# Patient Record
Sex: MALE | Race: BLACK OR AFRICAN AMERICAN | NOT HISPANIC OR LATINO | Employment: FULL TIME | URBAN - METROPOLITAN AREA
[De-identification: names, ages, dates, MRNs, and addresses within clinical notes are randomized per-mention and may not be internally consistent; named-entity substitution may affect disease eponyms.]

---

## 2017-02-13 PROBLEM — E78.6 HDL LIPOPROTEIN DEFICIENCY: Status: ACTIVE | Noted: 2017-02-13

## 2017-02-13 PROBLEM — E78.5 HYPERLIPIDEMIA: Status: ACTIVE | Noted: 2017-02-13

## 2017-02-13 PROBLEM — E11.29 DIABETES MELLITUS WITH PROTEINURIA: Status: ACTIVE | Noted: 2017-02-13

## 2017-02-13 PROBLEM — R79.89 ABNORMAL LIVER FUNCTION TESTS: Status: ACTIVE | Noted: 2017-02-13

## 2017-02-13 PROBLEM — R80.9 MICROALBUMINURIA: Status: ACTIVE | Noted: 2017-02-13

## 2017-02-13 PROBLEM — R80.9 DIABETES MELLITUS WITH PROTEINURIA: Status: ACTIVE | Noted: 2017-02-13

## 2019-01-01 ENCOUNTER — LAB VISIT (OUTPATIENT)
Dept: LAB | Facility: HOSPITAL | Age: 54
End: 2019-01-01
Attending: FAMILY MEDICINE
Payer: COMMERCIAL

## 2019-01-01 ENCOUNTER — HOSPITAL ENCOUNTER (INPATIENT)
Facility: HOSPITAL | Age: 54
LOS: 8 days | DRG: 270 | End: 2019-04-16
Attending: EMERGENCY MEDICINE | Admitting: INTERNAL MEDICINE
Payer: COMMERCIAL

## 2019-01-01 ENCOUNTER — CLINICAL SUPPORT (OUTPATIENT)
Dept: OPHTHALMOLOGY | Facility: CLINIC | Age: 54
End: 2019-01-01
Attending: FAMILY MEDICINE
Payer: COMMERCIAL

## 2019-01-01 ENCOUNTER — HOSPITAL ENCOUNTER (OUTPATIENT)
Dept: RADIOLOGY | Facility: HOSPITAL | Age: 54
Discharge: HOME | DRG: 270 | End: 2019-04-05
Attending: FAMILY MEDICINE
Payer: COMMERCIAL

## 2019-01-01 ENCOUNTER — ANESTHESIA (OUTPATIENT)
Dept: INTENSIVE CARE | Facility: HOSPITAL | Age: 54
DRG: 270 | End: 2019-01-01
Payer: COMMERCIAL

## 2019-01-01 ENCOUNTER — ANESTHESIA EVENT (OUTPATIENT)
Dept: INTENSIVE CARE | Facility: HOSPITAL | Age: 54
DRG: 270 | End: 2019-01-01
Payer: COMMERCIAL

## 2019-01-01 ENCOUNTER — TELEPHONE (OUTPATIENT)
Dept: FAMILY MEDICINE | Facility: CLINIC | Age: 54
End: 2019-01-01

## 2019-01-01 ENCOUNTER — OFFICE VISIT (OUTPATIENT)
Dept: FAMILY MEDICINE | Facility: CLINIC | Age: 54
End: 2019-01-01
Payer: COMMERCIAL

## 2019-01-01 VITALS
HEIGHT: 71 IN | TEMPERATURE: 98 F | SYSTOLIC BLOOD PRESSURE: 132 MMHG | RESPIRATION RATE: 20 BRPM | RESPIRATION RATE: 22 BRPM | BODY MASS INDEX: 33.21 KG/M2 | OXYGEN SATURATION: 93 % | TEMPERATURE: 100 F | OXYGEN SATURATION: 97 % | DIASTOLIC BLOOD PRESSURE: 84 MMHG | WEIGHT: 232 LBS | WEIGHT: 244.06 LBS | HEART RATE: 108 BPM | BODY MASS INDEX: 34.17 KG/M2 | HEIGHT: 70 IN | SYSTOLIC BLOOD PRESSURE: 83 MMHG | DIASTOLIC BLOOD PRESSURE: 45 MMHG

## 2019-01-01 DIAGNOSIS — R60.0 BILATERAL LOWER EXTREMITY EDEMA: ICD-10-CM

## 2019-01-01 DIAGNOSIS — F17.200 TOBACCO USE DISORDER: ICD-10-CM

## 2019-01-01 DIAGNOSIS — E66.9 OBESITY, UNSPECIFIED CLASSIFICATION, UNSPECIFIED OBESITY TYPE, UNSPECIFIED WHETHER SERIOUS COMORBIDITY PRESENT: ICD-10-CM

## 2019-01-01 DIAGNOSIS — I21.4 NSTEMI (NON-ST ELEVATED MYOCARDIAL INFARCTION): ICD-10-CM

## 2019-01-01 DIAGNOSIS — E11.65 UNCONTROLLED TYPE 2 DIABETES MELLITUS WITH HYPERGLYCEMIA: ICD-10-CM

## 2019-01-01 DIAGNOSIS — J81.1 PULMONARY EDEMA: ICD-10-CM

## 2019-01-01 DIAGNOSIS — J96.01 ACUTE RESPIRATORY FAILURE WITH HYPOXIA: ICD-10-CM

## 2019-01-01 DIAGNOSIS — R06.02 SOB (SHORTNESS OF BREATH): ICD-10-CM

## 2019-01-01 DIAGNOSIS — Z13.220 ENCOUNTER FOR LIPID SCREENING FOR CARDIOVASCULAR DISEASE: ICD-10-CM

## 2019-01-01 DIAGNOSIS — R80.9 DIABETES MELLITUS WITH PROTEINURIA: Primary | ICD-10-CM

## 2019-01-01 DIAGNOSIS — R94.31 ABNORMAL EKG: ICD-10-CM

## 2019-01-01 DIAGNOSIS — I50.20 HFREF (HEART FAILURE WITH REDUCED EJECTION FRACTION): ICD-10-CM

## 2019-01-01 DIAGNOSIS — I16.1 HYPERTENSIVE EMERGENCY: Primary | ICD-10-CM

## 2019-01-01 DIAGNOSIS — J81.0 ACUTE PULMONARY EDEMA: ICD-10-CM

## 2019-01-01 DIAGNOSIS — Z13.6 ENCOUNTER FOR LIPID SCREENING FOR CARDIOVASCULAR DISEASE: ICD-10-CM

## 2019-01-01 DIAGNOSIS — R06.02 SOB (SHORTNESS OF BREATH): Primary | ICD-10-CM

## 2019-01-01 DIAGNOSIS — I49.01 VENTRICULAR FIBRILLATION: ICD-10-CM

## 2019-01-01 DIAGNOSIS — J96.02 ACUTE RESPIRATORY FAILURE WITH HYPOXIA AND HYPERCAPNIA: ICD-10-CM

## 2019-01-01 DIAGNOSIS — R57.0 CARDIOGENIC SHOCK: ICD-10-CM

## 2019-01-01 DIAGNOSIS — R79.89 ELEVATED TROPONIN: ICD-10-CM

## 2019-01-01 DIAGNOSIS — I47.20 VT (VENTRICULAR TACHYCARDIA): ICD-10-CM

## 2019-01-01 DIAGNOSIS — I49.9 ABNORMAL HEART RHYTHM: ICD-10-CM

## 2019-01-01 DIAGNOSIS — I50.41 ACUTE COMBINED SYSTOLIC AND DIASTOLIC CONGESTIVE HEART FAILURE: ICD-10-CM

## 2019-01-01 DIAGNOSIS — E11.29 DIABETES MELLITUS WITH PROTEINURIA: Primary | ICD-10-CM

## 2019-01-01 DIAGNOSIS — Z72.0 TOBACCO ABUSE: ICD-10-CM

## 2019-01-01 DIAGNOSIS — Z12.11 COLON CANCER SCREENING: ICD-10-CM

## 2019-01-01 DIAGNOSIS — R06.02 SOB (SHORTNESS OF BREATH) ON EXERTION: Primary | ICD-10-CM

## 2019-01-01 DIAGNOSIS — I50.9 CHF (CONGESTIVE HEART FAILURE): ICD-10-CM

## 2019-01-01 DIAGNOSIS — J96.01 ACUTE RESPIRATORY FAILURE WITH HYPOXIA AND HYPERCAPNIA: ICD-10-CM

## 2019-01-01 DIAGNOSIS — R06.02 SHORTNESS OF BREATH: ICD-10-CM

## 2019-01-01 LAB
ABO + RH BLD: NORMAL
ABO + RH BLD: NORMAL
ALBUMIN SERPL BCP-MCNC: 2 G/DL (ref 3.5–5.2)
ALBUMIN SERPL BCP-MCNC: 2 G/DL (ref 3.5–5.2)
ALBUMIN SERPL BCP-MCNC: 2.1 G/DL (ref 3.5–5.2)
ALBUMIN SERPL BCP-MCNC: 2.2 G/DL (ref 3.5–5.2)
ALBUMIN SERPL BCP-MCNC: 2.3 G/DL (ref 3.5–5.2)
ALBUMIN SERPL BCP-MCNC: 2.3 G/DL (ref 3.5–5.2)
ALBUMIN SERPL BCP-MCNC: 2.4 G/DL (ref 3.5–5.2)
ALBUMIN SERPL BCP-MCNC: 2.9 G/DL (ref 3.5–5.2)
ALBUMIN SERPL BCP-MCNC: 3.3 G/DL (ref 3.5–5.2)
ALLENS TEST: ABNORMAL
ALLENS TEST: NORMAL
ALP SERPL-CCNC: 101 U/L (ref 55–135)
ALP SERPL-CCNC: 109 U/L (ref 55–135)
ALP SERPL-CCNC: 110 U/L (ref 55–135)
ALP SERPL-CCNC: 111 U/L (ref 55–135)
ALP SERPL-CCNC: 64 U/L (ref 55–135)
ALP SERPL-CCNC: 70 U/L (ref 55–135)
ALP SERPL-CCNC: 74 U/L (ref 55–135)
ALP SERPL-CCNC: 87 U/L (ref 55–135)
ALP SERPL-CCNC: 89 U/L (ref 55–135)
ALP SERPL-CCNC: 90 U/L (ref 55–135)
ALP SERPL-CCNC: 90 U/L (ref 55–135)
ALP SERPL-CCNC: 94 U/L (ref 55–135)
ALP SERPL-CCNC: 95 U/L (ref 55–135)
ALP SERPL-CCNC: 99 U/L (ref 55–135)
ALT SERPL W/O P-5'-P-CCNC: 36 U/L (ref 10–44)
ALT SERPL W/O P-5'-P-CCNC: 39 U/L (ref 10–44)
ALT SERPL W/O P-5'-P-CCNC: 40 U/L (ref 10–44)
ALT SERPL W/O P-5'-P-CCNC: 40 U/L (ref 10–44)
ALT SERPL W/O P-5'-P-CCNC: 45 U/L (ref 10–44)
ALT SERPL W/O P-5'-P-CCNC: 45 U/L (ref 10–44)
ALT SERPL W/O P-5'-P-CCNC: 46 U/L (ref 10–44)
ALT SERPL W/O P-5'-P-CCNC: 46 U/L (ref 10–44)
ALT SERPL W/O P-5'-P-CCNC: 47 U/L (ref 10–44)
ALT SERPL W/O P-5'-P-CCNC: 47 U/L (ref 10–44)
ALT SERPL W/O P-5'-P-CCNC: 49 U/L (ref 10–44)
ALT SERPL W/O P-5'-P-CCNC: 49 U/L (ref 10–44)
ALT SERPL W/O P-5'-P-CCNC: 55 U/L (ref 10–44)
ALT SERPL W/O P-5'-P-CCNC: 59 U/L (ref 10–44)
ALT SERPL W/O P-5'-P-CCNC: 59 U/L (ref 10–44)
ALT SERPL W/O P-5'-P-CCNC: 62 U/L (ref 10–44)
ANION GAP SERPL CALC-SCNC: 10 MMOL/L (ref 8–16)
ANION GAP SERPL CALC-SCNC: 11 MMOL/L (ref 8–16)
ANION GAP SERPL CALC-SCNC: 12 MMOL/L (ref 8–16)
ANION GAP SERPL CALC-SCNC: 13 MMOL/L (ref 8–16)
ANION GAP SERPL CALC-SCNC: 14 MMOL/L (ref 8–16)
ANION GAP SERPL CALC-SCNC: 14 MMOL/L (ref 8–16)
ANION GAP SERPL CALC-SCNC: 8 MMOL/L (ref 8–16)
ANION GAP SERPL CALC-SCNC: 9 MMOL/L (ref 8–16)
ANION GAP SERPL CALC-SCNC: 9 MMOL/L (ref 8–16)
AORTIC ROOT ANNULUS: 3.58 CM
AORTIC VALVE CUSP SEPERATION: 2.58 CM
APTT BLDCRRT: 25.2 SEC (ref 21–32)
APTT BLDCRRT: 25.2 SEC (ref 21–32)
APTT BLDCRRT: 25.5 SEC (ref 21–32)
APTT BLDCRRT: 26.2 SEC (ref 21–32)
APTT BLDCRRT: 26.4 SEC (ref 21–32)
APTT BLDCRRT: 26.4 SEC (ref 21–32)
APTT BLDCRRT: 27.8 SEC (ref 21–32)
APTT BLDCRRT: 29.7 SEC (ref 21–32)
APTT BLDCRRT: 30.6 SEC (ref 21–32)
APTT BLDCRRT: 30.6 SEC (ref 21–32)
APTT BLDCRRT: 32.8 SEC (ref 21–32)
APTT BLDCRRT: 38.5 SEC (ref 21–32)
APTT BLDCRRT: 40.7 SEC (ref 21–32)
APTT BLDCRRT: 44.2 SEC (ref 21–32)
ASCENDING AORTA: 3.04 CM
ASCENDING AORTA: 3.61 CM
AST SERPL-CCNC: 19 U/L (ref 10–40)
AST SERPL-CCNC: 28 U/L (ref 10–40)
AST SERPL-CCNC: 30 U/L (ref 10–40)
AST SERPL-CCNC: 30 U/L (ref 10–40)
AST SERPL-CCNC: 31 U/L (ref 10–40)
AST SERPL-CCNC: 34 U/L (ref 10–40)
AST SERPL-CCNC: 39 U/L (ref 10–40)
AST SERPL-CCNC: 39 U/L (ref 10–40)
AST SERPL-CCNC: 41 U/L (ref 10–40)
AST SERPL-CCNC: 42 U/L (ref 10–40)
AST SERPL-CCNC: 43 U/L (ref 10–40)
AST SERPL-CCNC: 43 U/L (ref 10–40)
AST SERPL-CCNC: 47 U/L (ref 10–40)
AST SERPL-CCNC: 48 U/L (ref 10–40)
AST SERPL-CCNC: 48 U/L (ref 10–40)
AST SERPL-CCNC: 57 U/L (ref 10–40)
AV INDEX (PROSTH): 0.67
AV INDEX (PROSTH): 0.92
AV MEAN GRADIENT: 4.42 MMHG
AV MEAN GRADIENT: 6.13 MMHG
AV PEAK GRADIENT: 6.05 MMHG
AV PEAK GRADIENT: 9.99 MMHG
AV VALVE AREA: 2.09 CM2
AV VALVE AREA: 2.99 CM2
AV VELOCITY RATIO: 0.73
AV VELOCITY RATIO: 0.98
B-OH-BUTYR BLD STRIP-SCNC: 0.4 MMOL/L (ref 0–0.5)
BACTERIA #/AREA URNS AUTO: ABNORMAL /HPF
BACTERIA #/AREA URNS AUTO: ABNORMAL /HPF
BACTERIA BLD CULT: NORMAL
BACTERIA BLD CULT: NORMAL
BACTERIA SPEC AEROBE CULT: NORMAL
BACTERIA SPEC AEROBE CULT: NORMAL
BASOPHILS # BLD AUTO: 0 K/UL (ref 0–0.2)
BASOPHILS # BLD AUTO: 0.01 K/UL (ref 0–0.2)
BASOPHILS # BLD AUTO: 0.02 K/UL (ref 0–0.2)
BASOPHILS # BLD AUTO: 0.03 K/UL (ref 0–0.2)
BASOPHILS # BLD AUTO: 0.04 K/UL (ref 0–0.2)
BASOPHILS # BLD AUTO: 0.04 K/UL (ref 0–0.2)
BASOPHILS # BLD AUTO: 0.05 K/UL (ref 0–0.2)
BASOPHILS NFR BLD: 0 % (ref 0–1.9)
BASOPHILS NFR BLD: 0.1 % (ref 0–1.9)
BASOPHILS NFR BLD: 0.2 % (ref 0–1.9)
BASOPHILS NFR BLD: 0.3 % (ref 0–1.9)
BASOPHILS NFR BLD: 0.4 % (ref 0–1.9)
BILIRUB SERPL-MCNC: 0.5 MG/DL (ref 0.1–1)
BILIRUB SERPL-MCNC: 0.6 MG/DL (ref 0.1–1)
BILIRUB SERPL-MCNC: 0.7 MG/DL (ref 0.1–1)
BILIRUB SERPL-MCNC: 0.8 MG/DL (ref 0.1–1)
BILIRUB SERPL-MCNC: 0.9 MG/DL (ref 0.1–1)
BILIRUB SERPL-MCNC: 0.9 MG/DL (ref 0.1–1)
BILIRUB SERPL-MCNC: 1 MG/DL (ref 0.1–1)
BILIRUB SERPL-MCNC: 1.1 MG/DL (ref 0.1–1)
BILIRUB SERPL-MCNC: 1.2 MG/DL (ref 0.1–1)
BILIRUB SERPL-MCNC: 1.3 MG/DL (ref 0.1–1)
BILIRUB SERPL-MCNC: 1.4 MG/DL (ref 0.1–1)
BILIRUB UR QL STRIP: NEGATIVE
BLD GP AB SCN CELLS X3 SERPL QL: NORMAL
BLD GP AB SCN CELLS X3 SERPL QL: NORMAL
BNP SERPL-MCNC: 576 PG/ML (ref 0–99)
BNP SERPL-MCNC: 840 PG/ML (ref 0–99)
BSA FOR ECHO PROCEDURE: 2.28 M2
BSA FOR ECHO PROCEDURE: 2.34 M2
BUN SERPL-MCNC: 19 MG/DL (ref 6–20)
BUN SERPL-MCNC: 22 MG/DL (ref 6–20)
BUN SERPL-MCNC: 23 MG/DL (ref 6–20)
BUN SERPL-MCNC: 24 MG/DL (ref 6–20)
BUN SERPL-MCNC: 24 MG/DL (ref 6–20)
BUN SERPL-MCNC: 26 MG/DL (ref 6–20)
BUN SERPL-MCNC: 27 MG/DL (ref 6–20)
BUN SERPL-MCNC: 27 MG/DL (ref 6–20)
BUN SERPL-MCNC: 28 MG/DL (ref 6–20)
BUN SERPL-MCNC: 29 MG/DL (ref 6–20)
BUN SERPL-MCNC: 30 MG/DL (ref 6–20)
BUN SERPL-MCNC: 31 MG/DL (ref 6–20)
BUN SERPL-MCNC: 31 MG/DL (ref 6–20)
BUN SERPL-MCNC: 32 MG/DL (ref 6–20)
BUN SERPL-MCNC: 32 MG/DL (ref 6–20)
BUN SERPL-MCNC: 33 MG/DL (ref 6–20)
BUN SERPL-MCNC: 33 MG/DL (ref 6–20)
BUN SERPL-MCNC: 34 MG/DL (ref 6–20)
CALCIUM SERPL-MCNC: 10 MG/DL (ref 8.7–10.5)
CALCIUM SERPL-MCNC: 8.4 MG/DL (ref 8.7–10.5)
CALCIUM SERPL-MCNC: 8.4 MG/DL (ref 8.7–10.5)
CALCIUM SERPL-MCNC: 8.6 MG/DL (ref 8.7–10.5)
CALCIUM SERPL-MCNC: 8.8 MG/DL (ref 8.7–10.5)
CALCIUM SERPL-MCNC: 8.9 MG/DL (ref 8.7–10.5)
CALCIUM SERPL-MCNC: 9 MG/DL (ref 8.7–10.5)
CALCIUM SERPL-MCNC: 9.1 MG/DL (ref 8.7–10.5)
CALCIUM SERPL-MCNC: 9.5 MG/DL (ref 8.7–10.5)
CALCIUM SERPL-MCNC: 9.5 MG/DL (ref 8.7–10.5)
CALCIUM SERPL-MCNC: 9.6 MG/DL (ref 8.7–10.5)
CHLORIDE SERPL-SCNC: 100 MMOL/L (ref 95–110)
CHLORIDE SERPL-SCNC: 100 MMOL/L (ref 95–110)
CHLORIDE SERPL-SCNC: 102 MMOL/L (ref 95–110)
CHLORIDE SERPL-SCNC: 94 MMOL/L (ref 95–110)
CHLORIDE SERPL-SCNC: 94 MMOL/L (ref 95–110)
CHLORIDE SERPL-SCNC: 95 MMOL/L (ref 95–110)
CHLORIDE SERPL-SCNC: 96 MMOL/L (ref 95–110)
CHLORIDE SERPL-SCNC: 97 MMOL/L (ref 95–110)
CHLORIDE SERPL-SCNC: 98 MMOL/L (ref 95–110)
CHLORIDE SERPL-SCNC: 98 MMOL/L (ref 95–110)
CHLORIDE SERPL-SCNC: 99 MMOL/L (ref 95–110)
CHLORIDE SERPL-SCNC: 99 MMOL/L (ref 95–110)
CHOLEST SERPL-MCNC: 186 MG/DL (ref 120–199)
CHOLEST SERPL-MCNC: 214 MG/DL (ref 120–199)
CHOLEST/HDLC SERPL: 10.7 {RATIO} (ref 2–5)
CHOLEST/HDLC SERPL: 8.5 {RATIO} (ref 2–5)
CLARITY UR REFRACT.AUTO: ABNORMAL
CLARITY UR REFRACT.AUTO: ABNORMAL
CLARITY UR REFRACT.AUTO: CLEAR
CLARITY UR: ABNORMAL
CO2 SERPL-SCNC: 21 MMOL/L (ref 23–29)
CO2 SERPL-SCNC: 23 MMOL/L (ref 23–29)
CO2 SERPL-SCNC: 23 MMOL/L (ref 23–29)
CO2 SERPL-SCNC: 24 MMOL/L (ref 23–29)
CO2 SERPL-SCNC: 25 MMOL/L (ref 23–29)
CO2 SERPL-SCNC: 25 MMOL/L (ref 23–29)
CO2 SERPL-SCNC: 26 MMOL/L (ref 23–29)
CO2 SERPL-SCNC: 26 MMOL/L (ref 23–29)
CO2 SERPL-SCNC: 27 MMOL/L (ref 23–29)
CO2 SERPL-SCNC: 28 MMOL/L (ref 23–29)
COLOR UR AUTO: ABNORMAL
COLOR UR: YELLOW
CREAT SERPL-MCNC: 0.9 MG/DL (ref 0.5–1.4)
CREAT SERPL-MCNC: 0.9 MG/DL (ref 0.5–1.4)
CREAT SERPL-MCNC: 1 MG/DL (ref 0.5–1.4)
CREAT SERPL-MCNC: 1.1 MG/DL (ref 0.5–1.4)
CREAT SERPL-MCNC: 1.2 MG/DL (ref 0.5–1.4)
CREAT SERPL-MCNC: 1.3 MG/DL (ref 0.5–1.4)
CREAT SERPL-MCNC: 1.3 MG/DL (ref 0.5–1.4)
CV ECHO LV RWT: 0.26 CM
CV ECHO LV RWT: 0.62 CM
DELSYS: ABNORMAL
DELSYS: NORMAL
DIFFERENTIAL METHOD: ABNORMAL
DIFFERENTIAL METHOD: NORMAL
DOP CALC AO PEAK VEL: 1.23 M/S
DOP CALC AO PEAK VEL: 1.58 M/S
DOP CALC AO VTI: 22.13 CM
DOP CALC AO VTI: 23.93 CM
DOP CALC LVOT AREA: 3.14 CM2
DOP CALC LVOT AREA: 3.23 CM2
DOP CALC LVOT DIAMETER: 2 CM
DOP CALC LVOT DIAMETER: 2.03 CM
DOP CALC LVOT PEAK VEL: 1.15 M/S
DOP CALC LVOT PEAK VEL: 1.2 M/S
DOP CALC LVOT STROKE VOLUME: 46.32 CM3
DOP CALC LVOT STROKE VOLUME: 71.56 CM3
DOP CALCLVOT PEAK VEL VTI: 14.75 CM
DOP CALCLVOT PEAK VEL VTI: 22.12 CM
E WAVE DECELERATION TIME: 151.03 MSEC
E/A RATIO: 2.02
E/E' RATIO: 17.83
ECHO LV POSTERIOR WALL: 0.8 CM (ref 0.6–1.1)
ECHO LV POSTERIOR WALL: 1.45 CM (ref 0.6–1.1)
EOSINOPHIL # BLD AUTO: 0.1 K/UL (ref 0–0.5)
EOSINOPHIL # BLD AUTO: 0.2 K/UL (ref 0–0.5)
EOSINOPHIL # BLD AUTO: 0.3 K/UL (ref 0–0.5)
EOSINOPHIL # BLD AUTO: 0.4 K/UL (ref 0–0.5)
EOSINOPHIL NFR BLD: 0.4 % (ref 0–8)
EOSINOPHIL NFR BLD: 0.5 % (ref 0–8)
EOSINOPHIL NFR BLD: 0.5 % (ref 0–8)
EOSINOPHIL NFR BLD: 0.7 % (ref 0–8)
EOSINOPHIL NFR BLD: 1.2 % (ref 0–8)
EOSINOPHIL NFR BLD: 1.3 % (ref 0–8)
EOSINOPHIL NFR BLD: 1.3 % (ref 0–8)
EOSINOPHIL NFR BLD: 1.4 % (ref 0–8)
EOSINOPHIL NFR BLD: 1.4 % (ref 0–8)
EOSINOPHIL NFR BLD: 1.6 % (ref 0–8)
EOSINOPHIL NFR BLD: 1.7 % (ref 0–8)
EOSINOPHIL NFR BLD: 1.9 % (ref 0–8)
EOSINOPHIL NFR BLD: 2 % (ref 0–8)
EOSINOPHIL NFR BLD: 2.4 % (ref 0–8)
EOSINOPHIL NFR BLD: 2.6 % (ref 0–8)
EOSINOPHIL NFR BLD: 3.2 % (ref 0–8)
EP: 5
EP: 8
ERYTHROCYTE [DISTWIDTH] IN BLOOD BY AUTOMATED COUNT: 11.8 % (ref 11.5–14.5)
ERYTHROCYTE [DISTWIDTH] IN BLOOD BY AUTOMATED COUNT: 12.6 % (ref 11.5–14.5)
ERYTHROCYTE [DISTWIDTH] IN BLOOD BY AUTOMATED COUNT: 12.7 % (ref 11.5–14.5)
ERYTHROCYTE [DISTWIDTH] IN BLOOD BY AUTOMATED COUNT: 12.8 % (ref 11.5–14.5)
ERYTHROCYTE [DISTWIDTH] IN BLOOD BY AUTOMATED COUNT: 12.9 % (ref 11.5–14.5)
ERYTHROCYTE [DISTWIDTH] IN BLOOD BY AUTOMATED COUNT: 13.1 % (ref 11.5–14.5)
ERYTHROCYTE [DISTWIDTH] IN BLOOD BY AUTOMATED COUNT: 13.2 % (ref 11.5–14.5)
ERYTHROCYTE [DISTWIDTH] IN BLOOD BY AUTOMATED COUNT: 13.2 % (ref 11.5–14.5)
ERYTHROCYTE [SEDIMENTATION RATE] IN BLOOD BY WESTERGREN METHOD: 12 MM/H
ERYTHROCYTE [SEDIMENTATION RATE] IN BLOOD BY WESTERGREN METHOD: 12 MM/H
ERYTHROCYTE [SEDIMENTATION RATE] IN BLOOD BY WESTERGREN METHOD: 16 MM/H
ERYTHROCYTE [SEDIMENTATION RATE] IN BLOOD BY WESTERGREN METHOD: 16 MM/H
ERYTHROCYTE [SEDIMENTATION RATE] IN BLOOD BY WESTERGREN METHOD: 17 MM/H
ERYTHROCYTE [SEDIMENTATION RATE] IN BLOOD BY WESTERGREN METHOD: 19 MM/H
ERYTHROCYTE [SEDIMENTATION RATE] IN BLOOD BY WESTERGREN METHOD: 37 MM/H
EST. GFR  (AFRICAN AMERICAN): >60 ML/MIN/1.73 M^2
EST. GFR  (NON AFRICAN AMERICAN): >60 ML/MIN/1.73 M^2
ESTIMATED AVG GLUCOSE: 214 MG/DL (ref 68–131)
ESTIMATED AVG GLUCOSE: 226 MG/DL (ref 68–131)
FIO2: 21
FIO2: 40
FIO2: 50
FIO2: 70
FIO2: 80
FLOW: 15
FLOW: 15
FLOW: 4
FLOW: 5
FLOW: 5
FLOW: 7
FRACTIONAL SHORTENING: 15 % (ref 28–44)
FRACTIONAL SHORTENING: 16 % (ref 28–44)
GLUCOSE SERPL-MCNC: 114 MG/DL (ref 70–110)
GLUCOSE SERPL-MCNC: 139 MG/DL (ref 70–110)
GLUCOSE SERPL-MCNC: 140 MG/DL (ref 70–110)
GLUCOSE SERPL-MCNC: 140 MG/DL (ref 70–110)
GLUCOSE SERPL-MCNC: 151 MG/DL (ref 70–110)
GLUCOSE SERPL-MCNC: 154 MG/DL (ref 70–110)
GLUCOSE SERPL-MCNC: 155 MG/DL (ref 70–110)
GLUCOSE SERPL-MCNC: 158 MG/DL (ref 70–110)
GLUCOSE SERPL-MCNC: 163 MG/DL (ref 70–110)
GLUCOSE SERPL-MCNC: 164 MG/DL (ref 70–110)
GLUCOSE SERPL-MCNC: 166 MG/DL (ref 70–110)
GLUCOSE SERPL-MCNC: 169 MG/DL (ref 70–110)
GLUCOSE SERPL-MCNC: 169 MG/DL (ref 70–110)
GLUCOSE SERPL-MCNC: 173 MG/DL (ref 70–110)
GLUCOSE SERPL-MCNC: 183 MG/DL (ref 70–110)
GLUCOSE SERPL-MCNC: 193 MG/DL (ref 70–110)
GLUCOSE SERPL-MCNC: 198 MG/DL (ref 70–110)
GLUCOSE SERPL-MCNC: 206 MG/DL (ref 70–110)
GLUCOSE SERPL-MCNC: 278 MG/DL (ref 70–110)
GLUCOSE SERPL-MCNC: 281 MG/DL (ref 70–110)
GLUCOSE SERPL-MCNC: 292 MG/DL (ref 70–110)
GLUCOSE UR QL STRIP: NEGATIVE
GRAM STN SPEC: NORMAL
GRAN CASTS #/AREA URNS LPF: 0.2 /LPF
HAV IGM SERPL QL IA: NEGATIVE
HBA1C MFR BLD HPLC: 9.1 % (ref 4–5.6)
HBA1C MFR BLD HPLC: 9.5 % (ref 4–5.6)
HBV CORE IGM SERPL QL IA: NEGATIVE
HBV SURFACE AG SERPL QL IA: NEGATIVE
HCO3 UR-SCNC: 23.2 MMOL/L (ref 24–28)
HCO3 UR-SCNC: 23.6 MMOL/L (ref 24–28)
HCO3 UR-SCNC: 23.8 MMOL/L (ref 24–28)
HCO3 UR-SCNC: 23.9 MMOL/L (ref 24–28)
HCO3 UR-SCNC: 24.4 MMOL/L (ref 24–28)
HCO3 UR-SCNC: 24.9 MMOL/L (ref 24–28)
HCO3 UR-SCNC: 25.2 MMOL/L (ref 24–28)
HCO3 UR-SCNC: 25.2 MMOL/L (ref 24–28)
HCO3 UR-SCNC: 26.2 MMOL/L (ref 24–28)
HCO3 UR-SCNC: 26.5 MMOL/L (ref 24–28)
HCO3 UR-SCNC: 26.9 MMOL/L (ref 24–28)
HCO3 UR-SCNC: 27.2 MMOL/L (ref 24–28)
HCO3 UR-SCNC: 27.8 MMOL/L (ref 24–28)
HCO3 UR-SCNC: 28.1 MMOL/L (ref 24–28)
HCO3 UR-SCNC: 29 MMOL/L (ref 24–28)
HCT VFR BLD AUTO: 32.8 % (ref 40–54)
HCT VFR BLD AUTO: 33.7 % (ref 40–54)
HCT VFR BLD AUTO: 33.8 % (ref 40–54)
HCT VFR BLD AUTO: 34.2 % (ref 40–54)
HCT VFR BLD AUTO: 34.4 % (ref 40–54)
HCT VFR BLD AUTO: 34.7 % (ref 40–54)
HCT VFR BLD AUTO: 34.7 % (ref 40–54)
HCT VFR BLD AUTO: 35.5 % (ref 40–54)
HCT VFR BLD AUTO: 35.6 % (ref 40–54)
HCT VFR BLD AUTO: 35.6 % (ref 40–54)
HCT VFR BLD AUTO: 35.9 % (ref 40–54)
HCT VFR BLD AUTO: 37.3 % (ref 40–54)
HCT VFR BLD AUTO: 38.7 % (ref 40–54)
HCT VFR BLD AUTO: 38.8 % (ref 40–54)
HCT VFR BLD AUTO: 42.8 % (ref 40–54)
HCT VFR BLD AUTO: 43 % (ref 40–54)
HCV AB SERPL QL IA: NEGATIVE
HDLC SERPL-MCNC: 20 MG/DL (ref 40–75)
HDLC SERPL-MCNC: 22 MG/DL (ref 40–75)
HDLC SERPL: 11.8 % (ref 20–50)
HDLC SERPL: 9.3 % (ref 20–50)
HGB BLD-MCNC: 10.5 G/DL (ref 14–18)
HGB BLD-MCNC: 10.6 G/DL (ref 14–18)
HGB BLD-MCNC: 10.8 G/DL (ref 14–18)
HGB BLD-MCNC: 11 G/DL (ref 14–18)
HGB BLD-MCNC: 11 G/DL (ref 14–18)
HGB BLD-MCNC: 11.3 G/DL (ref 14–18)
HGB BLD-MCNC: 11.5 G/DL (ref 14–18)
HGB BLD-MCNC: 11.8 G/DL (ref 14–18)
HGB BLD-MCNC: 11.8 G/DL (ref 14–18)
HGB BLD-MCNC: 12 G/DL (ref 14–18)
HGB BLD-MCNC: 12.1 G/DL (ref 14–18)
HGB BLD-MCNC: 12.7 G/DL (ref 14–18)
HGB BLD-MCNC: 14.1 G/DL (ref 14–18)
HGB BLD-MCNC: 14.3 G/DL (ref 14–18)
HGB UR QL STRIP: ABNORMAL
HIV1+2 IGG SERPL QL IA.RAPID: NEGATIVE
HYALINE CASTS UR QL AUTO: 1 /LPF
HYALINE CASTS UR QL AUTO: 1 /LPF
HYALINE CASTS UR QL AUTO: 4 /LPF
IMM GRANULOCYTES # BLD AUTO: 0.03 K/UL (ref 0–0.04)
IMM GRANULOCYTES # BLD AUTO: 0.05 K/UL (ref 0–0.04)
IMM GRANULOCYTES # BLD AUTO: 0.05 K/UL (ref 0–0.04)
IMM GRANULOCYTES # BLD AUTO: 0.06 K/UL (ref 0–0.04)
IMM GRANULOCYTES # BLD AUTO: 0.07 K/UL (ref 0–0.04)
IMM GRANULOCYTES # BLD AUTO: 0.08 K/UL (ref 0–0.04)
IMM GRANULOCYTES # BLD AUTO: 0.08 K/UL (ref 0–0.04)
IMM GRANULOCYTES # BLD AUTO: 0.1 K/UL (ref 0–0.04)
IMM GRANULOCYTES # BLD AUTO: 0.11 K/UL (ref 0–0.04)
IMM GRANULOCYTES NFR BLD AUTO: 0.3 % (ref 0–0.5)
IMM GRANULOCYTES NFR BLD AUTO: 0.4 % (ref 0–0.5)
IMM GRANULOCYTES NFR BLD AUTO: 0.5 % (ref 0–0.5)
IMM GRANULOCYTES NFR BLD AUTO: 0.6 % (ref 0–0.5)
IMM GRANULOCYTES NFR BLD AUTO: 0.7 % (ref 0–0.5)
IMM GRANULOCYTES NFR BLD AUTO: 0.7 % (ref 0–0.5)
INR PPP: 1 (ref 0.8–1.2)
INR PPP: 1.1 (ref 0.8–1.2)
INTERVENTRICULAR SEPTUM: 0.9 CM (ref 0.6–1.1)
INTERVENTRICULAR SEPTUM: 1.51 CM (ref 0.6–1.1)
IP: 10
IP: 15
IVRT: 0.1 MSEC
KETONES UR QL STRIP: ABNORMAL
KETONES UR QL STRIP: NEGATIVE
L PNEUMO AG UR QL IA: NOT DETECTED
LA MAJOR: 5.62 CM
LA MAJOR: 6.73 CM
LA MINOR: 5.39 CM
LA MINOR: 5.56 CM
LA WIDTH: 4.34 CM
LA WIDTH: 4.46 CM
LACTATE SERPL-SCNC: 0.9 MMOL/L (ref 0.5–2.2)
LACTATE SERPL-SCNC: 0.9 MMOL/L (ref 0.5–2.2)
LACTATE SERPL-SCNC: 1 MMOL/L (ref 0.5–2.2)
LACTATE SERPL-SCNC: 1.6 MMOL/L (ref 0.5–2.2)
LACTATE SERPL-SCNC: 5 MMOL/L (ref 0.5–2.2)
LDH SERPL L TO P-CCNC: 1.04 MMOL/L (ref 0.5–2.2)
LDLC SERPL CALC-MCNC: 140.8 MG/DL (ref 63–159)
LDLC SERPL CALC-MCNC: 155.6 MG/DL (ref 63–159)
LEFT ATRIUM SIZE: 2.94 CM
LEFT ATRIUM SIZE: 4.43 CM
LEFT ATRIUM VOLUME INDEX: 26.1 ML/M2
LEFT ATRIUM VOLUME INDEX: 46 ML/M2
LEFT ATRIUM VOLUME: 102.26 CM3
LEFT ATRIUM VOLUME: 59.68 CM3
LEFT INTERNAL DIMENSION IN SYSTOLE: 3.98 CM (ref 2.1–4)
LEFT INTERNAL DIMENSION IN SYSTOLE: 5.2 CM (ref 2.1–4)
LEFT VENTRICLE DIASTOLIC VOLUME INDEX: 44.86 ML/M2
LEFT VENTRICLE DIASTOLIC VOLUME INDEX: 65.49 ML/M2
LEFT VENTRICLE DIASTOLIC VOLUME: 102.41 ML
LEFT VENTRICLE DIASTOLIC VOLUME: 145.6 ML
LEFT VENTRICLE MASS INDEX: 126.2 G/M2
LEFT VENTRICLE MASS INDEX: 95.6 G/M2
LEFT VENTRICLE SYSTOLIC VOLUME INDEX: 30.4 ML/M2
LEFT VENTRICLE SYSTOLIC VOLUME INDEX: 39.3 ML/M2
LEFT VENTRICLE SYSTOLIC VOLUME: 69.32 ML
LEFT VENTRICLE SYSTOLIC VOLUME: 87.38 ML
LEFT VENTRICULAR INTERNAL DIMENSION IN DIASTOLE: 4.7 CM (ref 3.5–6)
LEFT VENTRICULAR INTERNAL DIMENSION IN DIASTOLE: 6.2 CM (ref 3.5–6)
LEFT VENTRICULAR MASS: 212.52 G
LEFT VENTRICULAR MASS: 288.17 G
LEUKOCYTE ESTERASE UR QL STRIP: NEGATIVE
LV LATERAL E/E' RATIO: 13.38
LV SEPTAL E/E' RATIO: 26.75
LYMPHOCYTES # BLD AUTO: 1.2 K/UL (ref 1–4.8)
LYMPHOCYTES # BLD AUTO: 1.3 K/UL (ref 1–4.8)
LYMPHOCYTES # BLD AUTO: 1.3 K/UL (ref 1–4.8)
LYMPHOCYTES # BLD AUTO: 1.4 K/UL (ref 1–4.8)
LYMPHOCYTES # BLD AUTO: 1.5 K/UL (ref 1–4.8)
LYMPHOCYTES # BLD AUTO: 1.6 K/UL (ref 1–4.8)
LYMPHOCYTES # BLD AUTO: 1.7 K/UL (ref 1–4.8)
LYMPHOCYTES # BLD AUTO: 1.7 K/UL (ref 1–4.8)
LYMPHOCYTES # BLD AUTO: 1.9 K/UL (ref 1–4.8)
LYMPHOCYTES # BLD AUTO: 1.9 K/UL (ref 1–4.8)
LYMPHOCYTES # BLD AUTO: 2.8 K/UL (ref 1–4.8)
LYMPHOCYTES # BLD AUTO: 3 K/UL (ref 1–4.8)
LYMPHOCYTES NFR BLD: 10.8 % (ref 18–48)
LYMPHOCYTES NFR BLD: 11.5 % (ref 18–48)
LYMPHOCYTES NFR BLD: 11.5 % (ref 18–48)
LYMPHOCYTES NFR BLD: 12.7 % (ref 18–48)
LYMPHOCYTES NFR BLD: 13.5 % (ref 18–48)
LYMPHOCYTES NFR BLD: 14.4 % (ref 18–48)
LYMPHOCYTES NFR BLD: 15.9 % (ref 18–48)
LYMPHOCYTES NFR BLD: 16 % (ref 18–48)
LYMPHOCYTES NFR BLD: 16.1 % (ref 18–48)
LYMPHOCYTES NFR BLD: 16.8 % (ref 18–48)
LYMPHOCYTES NFR BLD: 18.6 % (ref 18–48)
LYMPHOCYTES NFR BLD: 19.9 % (ref 18–48)
LYMPHOCYTES NFR BLD: 20.6 % (ref 18–48)
LYMPHOCYTES NFR BLD: 8.6 % (ref 18–48)
MAGNESIUM SERPL-MCNC: 1.6 MG/DL (ref 1.6–2.6)
MAGNESIUM SERPL-MCNC: 1.8 MG/DL (ref 1.6–2.6)
MAGNESIUM SERPL-MCNC: 1.8 MG/DL (ref 1.6–2.6)
MAGNESIUM SERPL-MCNC: 1.9 MG/DL (ref 1.6–2.6)
MAGNESIUM SERPL-MCNC: 1.9 MG/DL (ref 1.6–2.6)
MAGNESIUM SERPL-MCNC: 2 MG/DL (ref 1.6–2.6)
MAGNESIUM SERPL-MCNC: 2 MG/DL (ref 1.6–2.6)
MAGNESIUM SERPL-MCNC: 2.1 MG/DL (ref 1.6–2.6)
MAGNESIUM SERPL-MCNC: 2.2 MG/DL (ref 1.6–2.6)
MAGNESIUM SERPL-MCNC: 2.3 MG/DL (ref 1.6–2.6)
MAGNESIUM SERPL-MCNC: 2.3 MG/DL (ref 1.6–2.6)
MAGNESIUM SERPL-MCNC: 2.4 MG/DL (ref 1.6–2.6)
MAGNESIUM SERPL-MCNC: 2.9 MG/DL (ref 1.6–2.6)
MCH RBC QN AUTO: 27.1 PG (ref 27–31)
MCH RBC QN AUTO: 27.3 PG (ref 27–31)
MCH RBC QN AUTO: 27.5 PG (ref 27–31)
MCH RBC QN AUTO: 27.6 PG (ref 27–31)
MCH RBC QN AUTO: 27.8 PG (ref 27–31)
MCH RBC QN AUTO: 27.9 PG (ref 27–31)
MCH RBC QN AUTO: 28.1 PG (ref 27–31)
MCH RBC QN AUTO: 28.2 PG (ref 27–31)
MCH RBC QN AUTO: 28.2 PG (ref 27–31)
MCH RBC QN AUTO: 28.3 PG (ref 27–31)
MCH RBC QN AUTO: 28.3 PG (ref 27–31)
MCH RBC QN AUTO: 28.4 PG (ref 27–31)
MCH RBC QN AUTO: 28.4 PG (ref 27–31)
MCH RBC QN AUTO: 29 PG (ref 27–31)
MCHC RBC AUTO-ENTMCNC: 31 G/DL (ref 32–36)
MCHC RBC AUTO-ENTMCNC: 31.4 G/DL (ref 32–36)
MCHC RBC AUTO-ENTMCNC: 31.5 G/DL (ref 32–36)
MCHC RBC AUTO-ENTMCNC: 32 G/DL (ref 32–36)
MCHC RBC AUTO-ENTMCNC: 32.2 G/DL (ref 32–36)
MCHC RBC AUTO-ENTMCNC: 32.4 G/DL (ref 32–36)
MCHC RBC AUTO-ENTMCNC: 32.4 G/DL (ref 32–36)
MCHC RBC AUTO-ENTMCNC: 32.6 G/DL (ref 32–36)
MCHC RBC AUTO-ENTMCNC: 32.6 G/DL (ref 32–36)
MCHC RBC AUTO-ENTMCNC: 32.7 G/DL (ref 32–36)
MCHC RBC AUTO-ENTMCNC: 32.8 G/DL (ref 32–36)
MCHC RBC AUTO-ENTMCNC: 33.1 G/DL (ref 32–36)
MCHC RBC AUTO-ENTMCNC: 33.1 G/DL (ref 32–36)
MCHC RBC AUTO-ENTMCNC: 33.4 G/DL (ref 32–36)
MCV RBC AUTO: 85 FL (ref 82–98)
MCV RBC AUTO: 86 FL (ref 82–98)
MCV RBC AUTO: 86 FL (ref 82–98)
MCV RBC AUTO: 87 FL (ref 82–98)
MCV RBC AUTO: 88 FL (ref 82–98)
MCV RBC AUTO: 89 FL (ref 82–98)
METHEMOGLOBIN: 0.2 % (ref 0–3)
MICROSCOPIC COMMENT: ABNORMAL
MICROSCOPIC COMMENT: NORMAL
MIN VOL: 25.8
MIN VOL: 8.45
MODE: ABNORMAL
MONOCYTES # BLD AUTO: 0.2 K/UL (ref 0.3–1)
MONOCYTES # BLD AUTO: 0.6 K/UL (ref 0.3–1)
MONOCYTES # BLD AUTO: 0.7 K/UL (ref 0.3–1)
MONOCYTES # BLD AUTO: 0.9 K/UL (ref 0.3–1)
MONOCYTES # BLD AUTO: 1 K/UL (ref 0.3–1)
MONOCYTES # BLD AUTO: 1.1 K/UL (ref 0.3–1)
MONOCYTES # BLD AUTO: 1.2 K/UL (ref 0.3–1)
MONOCYTES NFR BLD: 2.5 % (ref 4–15)
MONOCYTES NFR BLD: 5.3 % (ref 4–15)
MONOCYTES NFR BLD: 5.3 % (ref 4–15)
MONOCYTES NFR BLD: 5.8 % (ref 4–15)
MONOCYTES NFR BLD: 5.9 % (ref 4–15)
MONOCYTES NFR BLD: 6 % (ref 4–15)
MONOCYTES NFR BLD: 6 % (ref 4–15)
MONOCYTES NFR BLD: 6.2 % (ref 4–15)
MONOCYTES NFR BLD: 6.2 % (ref 4–15)
MONOCYTES NFR BLD: 6.5 % (ref 4–15)
MONOCYTES NFR BLD: 6.6 % (ref 4–15)
MONOCYTES NFR BLD: 6.6 % (ref 4–15)
MONOCYTES NFR BLD: 6.7 % (ref 4–15)
MONOCYTES NFR BLD: 7.6 % (ref 4–15)
MONOCYTES NFR BLD: 8.8 % (ref 4–15)
MONOCYTES NFR BLD: 9.2 % (ref 4–15)
MV PEAK A VEL: 0.53 M/S
MV PEAK E VEL: 1.07 M/S
NEUTROPHILS # BLD AUTO: 10.3 K/UL (ref 1.8–7.7)
NEUTROPHILS # BLD AUTO: 10.3 K/UL (ref 1.8–7.7)
NEUTROPHILS # BLD AUTO: 10.5 K/UL (ref 1.8–7.7)
NEUTROPHILS # BLD AUTO: 11.9 K/UL (ref 1.8–7.7)
NEUTROPHILS # BLD AUTO: 5.9 K/UL (ref 1.8–7.7)
NEUTROPHILS # BLD AUTO: 7.4 K/UL (ref 1.8–7.7)
NEUTROPHILS # BLD AUTO: 8.1 K/UL (ref 1.8–7.7)
NEUTROPHILS # BLD AUTO: 8.2 K/UL (ref 1.8–7.7)
NEUTROPHILS # BLD AUTO: 8.2 K/UL (ref 1.8–7.7)
NEUTROPHILS # BLD AUTO: 8.3 K/UL (ref 1.8–7.7)
NEUTROPHILS # BLD AUTO: 8.3 K/UL (ref 1.8–7.7)
NEUTROPHILS # BLD AUTO: 8.7 K/UL (ref 1.8–7.7)
NEUTROPHILS # BLD AUTO: 8.7 K/UL (ref 1.8–7.7)
NEUTROPHILS # BLD AUTO: 9.3 K/UL (ref 1.8–7.7)
NEUTROPHILS # BLD AUTO: 9.3 K/UL (ref 1.8–7.7)
NEUTROPHILS # BLD AUTO: 9.5 K/UL (ref 1.8–7.7)
NEUTROPHILS NFR BLD: 69.5 % (ref 38–73)
NEUTROPHILS NFR BLD: 70 % (ref 38–73)
NEUTROPHILS NFR BLD: 70.7 % (ref 38–73)
NEUTROPHILS NFR BLD: 73.4 % (ref 38–73)
NEUTROPHILS NFR BLD: 75.8 % (ref 38–73)
NEUTROPHILS NFR BLD: 76.8 % (ref 38–73)
NEUTROPHILS NFR BLD: 77.6 % (ref 38–73)
NEUTROPHILS NFR BLD: 77.7 % (ref 38–73)
NEUTROPHILS NFR BLD: 78.3 % (ref 38–73)
NEUTROPHILS NFR BLD: 78.3 % (ref 38–73)
NEUTROPHILS NFR BLD: 78.6 % (ref 38–73)
NEUTROPHILS NFR BLD: 79.7 % (ref 38–73)
NEUTROPHILS NFR BLD: 80.7 % (ref 38–73)
NEUTROPHILS NFR BLD: 81.1 % (ref 38–73)
NEUTROPHILS NFR BLD: 81.1 % (ref 38–73)
NEUTROPHILS NFR BLD: 82.1 % (ref 38–73)
NITRITE UR QL STRIP: NEGATIVE
NON-SQ EPI CELLS #/AREA URNS HPF: ABNORMAL /HPF
NONHDLC SERPL-MCNC: 164 MG/DL
NONHDLC SERPL-MCNC: 194 MG/DL
NRBC BLD-RTO: 0 /100 WBC
OSMOLALITY SERPL: 283 MOSM/KG (ref 280–300)
OSMOLALITY SERPL: 296 MOSM/KG (ref 280–300)
OSMOLALITY UR: 691 MOSM/KG (ref 50–1200)
PCO2 BLDA: 36.3 MMHG (ref 35–45)
PCO2 BLDA: 36.6 MMHG (ref 35–45)
PCO2 BLDA: 37.2 MMHG (ref 35–45)
PCO2 BLDA: 39 MMHG (ref 35–45)
PCO2 BLDA: 41.8 MMHG (ref 35–45)
PCO2 BLDA: 42.3 MMHG (ref 35–45)
PCO2 BLDA: 42.4 MMHG (ref 35–45)
PCO2 BLDA: 42.6 MMHG (ref 35–45)
PCO2 BLDA: 44.1 MMHG (ref 35–45)
PCO2 BLDA: 44.5 MMHG (ref 35–45)
PCO2 BLDA: 45 MMHG (ref 35–45)
PCO2 BLDA: 46.2 MMHG (ref 35–45)
PCO2 BLDA: 46.7 MMHG (ref 35–45)
PCO2 BLDA: 52.1 MMHG (ref 35–45)
PCO2 BLDA: 54.6 MMHG (ref 35–45)
PEEP: 5
PH SMN: 7.26 [PH] (ref 7.35–7.45)
PH SMN: 7.29 [PH] (ref 7.35–7.45)
PH SMN: 7.36 [PH] (ref 7.35–7.45)
PH SMN: 7.38 [PH] (ref 7.35–7.45)
PH SMN: 7.38 [PH] (ref 7.35–7.45)
PH SMN: 7.4 [PH] (ref 7.35–7.45)
PH SMN: 7.4 [PH] (ref 7.35–7.45)
PH SMN: 7.41 [PH] (ref 7.35–7.45)
PH SMN: 7.41 [PH] (ref 7.35–7.45)
PH SMN: 7.43 [PH] (ref 7.35–7.45)
PH SMN: 7.44 [PH] (ref 7.35–7.45)
PH UR STRIP: 5 [PH] (ref 5–8)
PHOSPHATE SERPL-MCNC: 3.4 MG/DL (ref 2.7–4.5)
PHOSPHATE SERPL-MCNC: 3.5 MG/DL (ref 2.7–4.5)
PHOSPHATE SERPL-MCNC: 3.6 MG/DL (ref 2.7–4.5)
PHOSPHATE SERPL-MCNC: 3.8 MG/DL (ref 2.7–4.5)
PHOSPHATE SERPL-MCNC: 3.9 MG/DL (ref 2.7–4.5)
PHOSPHATE SERPL-MCNC: 3.9 MG/DL (ref 2.7–4.5)
PHOSPHATE SERPL-MCNC: 4 MG/DL (ref 2.7–4.5)
PHOSPHATE SERPL-MCNC: 4 MG/DL (ref 2.7–4.5)
PHOSPHATE SERPL-MCNC: 4.4 MG/DL (ref 2.7–4.5)
PHOSPHATE SERPL-MCNC: 4.8 MG/DL (ref 2.7–4.5)
PIP: 26
PISA TR MAX VEL: 3.06 M/S
PLATELET # BLD AUTO: 321 K/UL (ref 150–350)
PLATELET # BLD AUTO: 399 K/UL (ref 150–350)
PLATELET # BLD AUTO: 400 K/UL (ref 150–350)
PLATELET # BLD AUTO: 441 K/UL (ref 150–350)
PLATELET # BLD AUTO: 443 K/UL (ref 150–350)
PLATELET # BLD AUTO: 448 K/UL (ref 150–350)
PLATELET # BLD AUTO: 452 K/UL (ref 150–350)
PLATELET # BLD AUTO: 467 K/UL (ref 150–350)
PLATELET # BLD AUTO: 474 K/UL (ref 150–350)
PLATELET # BLD AUTO: 474 K/UL (ref 150–350)
PLATELET # BLD AUTO: 477 K/UL (ref 150–350)
PLATELET # BLD AUTO: 477 K/UL (ref 150–350)
PLATELET # BLD AUTO: 492 K/UL (ref 150–350)
PLATELET # BLD AUTO: 536 K/UL (ref 150–350)
PMV BLD AUTO: 10.2 FL (ref 9.2–12.9)
PMV BLD AUTO: 9.1 FL (ref 9.2–12.9)
PMV BLD AUTO: 9.2 FL (ref 9.2–12.9)
PMV BLD AUTO: 9.3 FL (ref 9.2–12.9)
PMV BLD AUTO: 9.3 FL (ref 9.2–12.9)
PMV BLD AUTO: 9.5 FL (ref 9.2–12.9)
PMV BLD AUTO: 9.5 FL (ref 9.2–12.9)
PMV BLD AUTO: 9.6 FL (ref 9.2–12.9)
PMV BLD AUTO: 9.7 FL (ref 9.2–12.9)
PMV BLD AUTO: 9.9 FL (ref 9.2–12.9)
PO2 BLDA: 30 MMHG (ref 40–60)
PO2 BLDA: 31 MMHG (ref 40–60)
PO2 BLDA: 32 MMHG (ref 40–60)
PO2 BLDA: 32 MMHG (ref 40–60)
PO2 BLDA: 35 MMHG (ref 40–60)
PO2 BLDA: 36 MMHG (ref 40–60)
PO2 BLDA: 38 MMHG (ref 40–60)
PO2 BLDA: 45 MMHG (ref 40–60)
PO2 BLDA: 55 MMHG (ref 40–60)
PO2 BLDA: 70 MMHG (ref 80–100)
PO2 BLDA: 72 MMHG (ref 80–100)
PO2 BLDA: 93 MMHG (ref 80–100)
PO2 BLDA: 99 MMHG (ref 80–100)
POC BE: -1 MMOL/L
POC BE: -2 MMOL/L
POC BE: -2 MMOL/L
POC BE: -4 MMOL/L
POC BE: 0 MMOL/L
POC BE: 1 MMOL/L
POC BE: 1 MMOL/L
POC BE: 2 MMOL/L
POC BE: 2 MMOL/L
POC BE: 3 MMOL/L
POC BE: 4 MMOL/L
POC BE: 4 MMOL/L
POC SATURATED O2: 57 % (ref 95–100)
POC SATURATED O2: 58 % (ref 95–100)
POC SATURATED O2: 58 % (ref 95–100)
POC SATURATED O2: 62 % (ref 95–100)
POC SATURATED O2: 66 % (ref 95–100)
POC SATURATED O2: 68 % (ref 95–100)
POC SATURATED O2: 69 % (ref 95–100)
POC SATURATED O2: 79 % (ref 95–100)
POC SATURATED O2: 84 % (ref 95–100)
POC SATURATED O2: 94 % (ref 95–100)
POC SATURATED O2: 95 % (ref 95–100)
POC SATURATED O2: 97 % (ref 95–100)
POC SATURATED O2: 97 % (ref 95–100)
POC TCO2: 24 MMOL/L (ref 23–27)
POC TCO2: 25 MMOL/L (ref 23–27)
POC TCO2: 25 MMOL/L (ref 24–29)
POC TCO2: 26 MMOL/L (ref 24–29)
POC TCO2: 28 MMOL/L (ref 24–29)
POC TCO2: 29 MMOL/L (ref 24–29)
POC TCO2: 30 MMOL/L (ref 24–29)
POCT GLUCOSE: 143 MG/DL (ref 70–110)
POCT GLUCOSE: 159 MG/DL (ref 70–110)
POCT GLUCOSE: 160 MG/DL (ref 70–110)
POCT GLUCOSE: 163 MG/DL (ref 70–110)
POCT GLUCOSE: 163 MG/DL (ref 70–110)
POCT GLUCOSE: 167 MG/DL (ref 70–110)
POCT GLUCOSE: 169 MG/DL (ref 70–110)
POCT GLUCOSE: 171 MG/DL (ref 70–110)
POCT GLUCOSE: 174 MG/DL (ref 70–110)
POCT GLUCOSE: 176 MG/DL (ref 70–110)
POCT GLUCOSE: 177 MG/DL (ref 70–110)
POCT GLUCOSE: 179 MG/DL (ref 70–110)
POCT GLUCOSE: 180 MG/DL (ref 70–110)
POCT GLUCOSE: 183 MG/DL (ref 70–110)
POCT GLUCOSE: 185 MG/DL (ref 70–110)
POCT GLUCOSE: 185 MG/DL (ref 70–110)
POCT GLUCOSE: 188 MG/DL (ref 70–110)
POCT GLUCOSE: 190 MG/DL (ref 70–110)
POCT GLUCOSE: 190 MG/DL (ref 70–110)
POCT GLUCOSE: 195 MG/DL (ref 70–110)
POCT GLUCOSE: 196 MG/DL (ref 70–110)
POCT GLUCOSE: 197 MG/DL (ref 70–110)
POCT GLUCOSE: 197 MG/DL (ref 70–110)
POCT GLUCOSE: 199 MG/DL (ref 70–110)
POCT GLUCOSE: 201 MG/DL (ref 70–110)
POCT GLUCOSE: 202 MG/DL (ref 70–110)
POCT GLUCOSE: 209 MG/DL (ref 70–110)
POCT GLUCOSE: 224 MG/DL (ref 70–110)
POCT GLUCOSE: 231 MG/DL (ref 70–110)
POCT GLUCOSE: 240 MG/DL (ref 70–110)
POCT GLUCOSE: 250 MG/DL (ref 70–110)
POTASSIUM SERPL-SCNC: 3.5 MMOL/L (ref 3.5–5.1)
POTASSIUM SERPL-SCNC: 3.6 MMOL/L (ref 3.5–5.1)
POTASSIUM SERPL-SCNC: 3.6 MMOL/L (ref 3.5–5.1)
POTASSIUM SERPL-SCNC: 3.7 MMOL/L (ref 3.5–5.1)
POTASSIUM SERPL-SCNC: 3.8 MMOL/L (ref 3.5–5.1)
POTASSIUM SERPL-SCNC: 3.8 MMOL/L (ref 3.5–5.1)
POTASSIUM SERPL-SCNC: 3.9 MMOL/L (ref 3.5–5.1)
POTASSIUM SERPL-SCNC: 4 MMOL/L (ref 3.5–5.1)
POTASSIUM SERPL-SCNC: 4 MMOL/L (ref 3.5–5.1)
POTASSIUM SERPL-SCNC: 4.1 MMOL/L (ref 3.5–5.1)
POTASSIUM SERPL-SCNC: 4.2 MMOL/L (ref 3.5–5.1)
POTASSIUM SERPL-SCNC: 4.3 MMOL/L (ref 3.5–5.1)
POTASSIUM SERPL-SCNC: 4.4 MMOL/L (ref 3.5–5.1)
POTASSIUM SERPL-SCNC: 4.7 MMOL/L (ref 3.5–5.1)
POTASSIUM SERPL-SCNC: 4.8 MMOL/L (ref 3.5–5.1)
POTASSIUM SERPL-SCNC: 4.9 MMOL/L (ref 3.5–5.1)
POTASSIUM SERPL-SCNC: 5 MMOL/L (ref 3.5–5.1)
PROCALCITONIN SERPL IA-MCNC: 0.9 NG/ML
PROCALCITONIN SERPL IA-MCNC: 2.59 NG/ML
PROT SERPL-MCNC: 6.1 G/DL (ref 6–8.4)
PROT SERPL-MCNC: 6.2 G/DL (ref 6–8.4)
PROT SERPL-MCNC: 6.3 G/DL (ref 6–8.4)
PROT SERPL-MCNC: 6.5 G/DL (ref 6–8.4)
PROT SERPL-MCNC: 6.6 G/DL (ref 6–8.4)
PROT SERPL-MCNC: 6.6 G/DL (ref 6–8.4)
PROT SERPL-MCNC: 7.2 G/DL (ref 6–8.4)
PROT SERPL-MCNC: 8.1 G/DL (ref 6–8.4)
PROT UR QL STRIP: ABNORMAL
PROT UR QL STRIP: ABNORMAL
PROT UR QL STRIP: NEGATIVE
PROT UR QL STRIP: NEGATIVE
PROTHROMBIN TIME: 10.7 SEC (ref 9–12.5)
PROTHROMBIN TIME: 11 SEC (ref 9–12.5)
PROTHROMBIN TIME: 11.4 SEC (ref 9–12.5)
PULM VEIN S/D RATIO: 0.69
PV PEAK D VEL: 0.64 M/S
PV PEAK S VEL: 0.44 M/S
PV PEAK VELOCITY: 1.09 CM/S
RA MAJOR: 4.58 CM
RA MAJOR: 5.03 CM
RA PRESSURE: 3 MMHG
RA WIDTH: 3.61 CM
RA WIDTH: 4.3 CM
RBC # BLD AUTO: 3.77 M/UL (ref 4.6–6.2)
RBC # BLD AUTO: 3.88 M/UL (ref 4.6–6.2)
RBC # BLD AUTO: 3.91 M/UL (ref 4.6–6.2)
RBC # BLD AUTO: 3.92 M/UL (ref 4.6–6.2)
RBC # BLD AUTO: 3.95 M/UL (ref 4.6–6.2)
RBC # BLD AUTO: 4.06 M/UL (ref 4.6–6.2)
RBC # BLD AUTO: 4.07 M/UL (ref 4.6–6.2)
RBC # BLD AUTO: 4.07 M/UL (ref 4.6–6.2)
RBC # BLD AUTO: 4.17 M/UL (ref 4.6–6.2)
RBC # BLD AUTO: 4.19 M/UL (ref 4.6–6.2)
RBC # BLD AUTO: 4.19 M/UL (ref 4.6–6.2)
RBC # BLD AUTO: 4.28 M/UL (ref 4.6–6.2)
RBC # BLD AUTO: 4.37 M/UL (ref 4.6–6.2)
RBC # BLD AUTO: 4.47 M/UL (ref 4.6–6.2)
RBC # BLD AUTO: 4.93 M/UL (ref 4.6–6.2)
RBC # BLD AUTO: 4.96 M/UL (ref 4.6–6.2)
RBC #/AREA URNS AUTO: 3 /HPF (ref 0–4)
RBC #/AREA URNS AUTO: >100 /HPF (ref 0–4)
RBC #/AREA URNS AUTO: >100 /HPF (ref 0–4)
RBC #/AREA URNS HPF: 2 /HPF (ref 0–4)
RIGHT VENTRICULAR END-DIASTOLIC DIMENSION: 4.27 CM
RIGHT VENTRICULAR END-DIASTOLIC DIMENSION: 4.5 CM
RV TISSUE DOPPLER FREE WALL SYSTOLIC VELOCITY 1 (APICAL 4 CHAMBER VIEW): 16.79 M/S
SAMPLE: ABNORMAL
SAMPLE: NORMAL
SINUS: 3.23 CM
SINUS: 3.51 CM
SITE: ABNORMAL
SITE: NORMAL
SODIUM SERPL-SCNC: 130 MMOL/L (ref 136–145)
SODIUM SERPL-SCNC: 130 MMOL/L (ref 136–145)
SODIUM SERPL-SCNC: 131 MMOL/L (ref 136–145)
SODIUM SERPL-SCNC: 132 MMOL/L (ref 136–145)
SODIUM SERPL-SCNC: 133 MMOL/L (ref 136–145)
SODIUM SERPL-SCNC: 134 MMOL/L (ref 136–145)
SODIUM SERPL-SCNC: 135 MMOL/L (ref 136–145)
SODIUM SERPL-SCNC: 136 MMOL/L (ref 136–145)
SODIUM SERPL-SCNC: 137 MMOL/L (ref 136–145)
SODIUM UR-SCNC: 57 MMOL/L (ref 20–250)
SP GR UR STRIP: 1.01 (ref 1–1.03)
SP GR UR STRIP: 1.02 (ref 1–1.03)
SP GR UR STRIP: 1.03 (ref 1–1.03)
SP GR UR STRIP: 1.03 (ref 1–1.03)
SP02: 100
SP02: 88
SP02: 95
SP02: 96
SP02: 96
SP02: 97
SP02: 98
SP02: 98
SP02: 99
SQUAMOUS #/AREA URNS AUTO: 4 /HPF
STJ: 2.78 CM
STJ: 3.35 CM
TDI LATERAL: 0.08
TDI LATERAL: 0.13
TDI SEPTAL: 0.04
TDI SEPTAL: 0.05
TDI: 0.06
TDI: 0.09
TR MAX PG: 37.45 MMHG
TRICUSPID ANNULAR PLANE SYSTOLIC EXCURSION: 1.38 CM
TRICUSPID ANNULAR PLANE SYSTOLIC EXCURSION: 2 CM
TRIGL SERPL-MCNC: 116 MG/DL (ref 30–150)
TRIGL SERPL-MCNC: 192 MG/DL (ref 30–150)
TROPONIN I SERPL DL<=0.01 NG/ML-MCNC: 1.71 NG/ML (ref 0–0.03)
TROPONIN I SERPL DL<=0.01 NG/ML-MCNC: 3.11 NG/ML (ref 0–0.03)
TROPONIN I SERPL DL<=0.01 NG/ML-MCNC: 4.51 NG/ML (ref 0–0.03)
TROPONIN I SERPL DL<=0.01 NG/ML-MCNC: 4.69 NG/ML (ref 0–0.03)
TROPONIN I SERPL DL<=0.01 NG/ML-MCNC: 5 NG/ML (ref 0–0.03)
TROPONIN I SERPL DL<=0.01 NG/ML-MCNC: 6.61 NG/ML (ref 0–0.03)
TSH SERPL DL<=0.005 MIU/L-ACNC: 2.26 UIU/ML (ref 0.4–4)
TV REST PULMONARY ARTERY PRESSURE: 40 MMHG
URN SPEC COLLECT METH UR: ABNORMAL
UROBILINOGEN UR STRIP-ACNC: ABNORMAL EU/DL
VT: 470
VT: 500
WBC # BLD AUTO: 10.43 K/UL (ref 3.9–12.7)
WBC # BLD AUTO: 10.44 K/UL (ref 3.9–12.7)
WBC # BLD AUTO: 10.63 K/UL (ref 3.9–12.7)
WBC # BLD AUTO: 10.73 K/UL (ref 3.9–12.7)
WBC # BLD AUTO: 10.99 K/UL (ref 3.9–12.7)
WBC # BLD AUTO: 11.13 K/UL (ref 3.9–12.7)
WBC # BLD AUTO: 11.16 K/UL (ref 3.9–12.7)
WBC # BLD AUTO: 11.16 K/UL (ref 3.9–12.7)
WBC # BLD AUTO: 11.82 K/UL (ref 3.9–12.7)
WBC # BLD AUTO: 11.9 K/UL (ref 3.9–12.7)
WBC # BLD AUTO: 12.75 K/UL (ref 3.9–12.7)
WBC # BLD AUTO: 12.75 K/UL (ref 3.9–12.7)
WBC # BLD AUTO: 13.4 K/UL (ref 3.9–12.7)
WBC # BLD AUTO: 14.5 K/UL (ref 3.9–12.7)
WBC # BLD AUTO: 15.06 K/UL (ref 3.9–12.7)
WBC # BLD AUTO: 7.29 K/UL (ref 3.9–12.7)
WBC #/AREA URNS AUTO: 0 /HPF (ref 0–5)
WBC #/AREA URNS AUTO: 2 /HPF (ref 0–5)
WBC #/AREA URNS AUTO: 3 /HPF (ref 0–5)

## 2019-01-01 PROCEDURE — 25000003 PHARM REV CODE 250: Performed by: HOSPITALIST

## 2019-01-01 PROCEDURE — C1751 CATH, INF, PER/CENT/MIDLINE: HCPCS | Performed by: INTERNAL MEDICINE

## 2019-01-01 PROCEDURE — 83605 ASSAY OF LACTIC ACID: CPT

## 2019-01-01 PROCEDURE — 27201423 OPTIME MED/SURG SUP & DEVICES STERILE SUPPLY: Performed by: INTERNAL MEDICINE

## 2019-01-01 PROCEDURE — 25000003 PHARM REV CODE 250: Performed by: STUDENT IN AN ORGANIZED HEALTH CARE EDUCATION/TRAINING PROGRAM

## 2019-01-01 PROCEDURE — 99232 PR SUBSEQUENT HOSPITAL CARE,LEVL II: ICD-10-PCS | Mod: ,,, | Performed by: INTERNAL MEDICINE

## 2019-01-01 PROCEDURE — 25000003 PHARM REV CODE 250: Performed by: INTERNAL MEDICINE

## 2019-01-01 PROCEDURE — 20000000 HC ICU ROOM

## 2019-01-01 PROCEDURE — 36415 COLL VENOUS BLD VENIPUNCTURE: CPT

## 2019-01-01 PROCEDURE — 99204 PR OFFICE/OUTPT VISIT, NEW, LEVL IV, 45-59 MIN: ICD-10-PCS | Mod: 25,S$GLB,, | Performed by: FAMILY MEDICINE

## 2019-01-01 PROCEDURE — 84132 ASSAY OF SERUM POTASSIUM: CPT

## 2019-01-01 PROCEDURE — 63600175 PHARM REV CODE 636 W HCPCS: Performed by: STUDENT IN AN ORGANIZED HEALTH CARE EDUCATION/TRAINING PROGRAM

## 2019-01-01 PROCEDURE — 81001 URINALYSIS AUTO W/SCOPE: CPT

## 2019-01-01 PROCEDURE — 93010 ELECTROCARDIOGRAM REPORT: CPT | Mod: 77,,, | Performed by: INTERNAL MEDICINE

## 2019-01-01 PROCEDURE — C1894 INTRO/SHEATH, NON-LASER: HCPCS | Performed by: INTERNAL MEDICINE

## 2019-01-01 PROCEDURE — 27000221 HC OXYGEN, UP TO 24 HOURS

## 2019-01-01 PROCEDURE — 99153 MOD SED SAME PHYS/QHP EA: CPT | Performed by: INTERNAL MEDICINE

## 2019-01-01 PROCEDURE — 86703 HIV-1/HIV-2 1 RESULT ANTBDY: CPT

## 2019-01-01 PROCEDURE — C1760 CLOSURE DEV, VASC: HCPCS | Performed by: INTERNAL MEDICINE

## 2019-01-01 PROCEDURE — 80048 BASIC METABOLIC PNL TOTAL CA: CPT

## 2019-01-01 PROCEDURE — 94761 N-INVAS EAR/PLS OXIMETRY MLT: CPT

## 2019-01-01 PROCEDURE — 27100094 HC IABP, SET-UP

## 2019-01-01 PROCEDURE — 87070 CULTURE OTHR SPECIMN AEROBIC: CPT

## 2019-01-01 PROCEDURE — 63600175 PHARM REV CODE 636 W HCPCS: Performed by: INTERNAL MEDICINE

## 2019-01-01 PROCEDURE — 27000202 HC IABP, ADD'L DAY

## 2019-01-01 PROCEDURE — C1874 STENT, COATED/COV W/DEL SYS: HCPCS | Performed by: INTERNAL MEDICINE

## 2019-01-01 PROCEDURE — 85025 COMPLETE CBC W/AUTO DIFF WBC: CPT | Mod: 91

## 2019-01-01 PROCEDURE — 85730 THROMBOPLASTIN TIME PARTIAL: CPT

## 2019-01-01 PROCEDURE — 82962 GLUCOSE BLOOD TEST: CPT

## 2019-01-01 PROCEDURE — 85025 COMPLETE CBC W/AUTO DIFF WBC: CPT

## 2019-01-01 PROCEDURE — 85610 PROTHROMBIN TIME: CPT

## 2019-01-01 PROCEDURE — 83735 ASSAY OF MAGNESIUM: CPT | Mod: 91

## 2019-01-01 PROCEDURE — 99900035 HC TECH TIME PER 15 MIN (STAT)

## 2019-01-01 PROCEDURE — 94660 CPAP INITIATION&MGMT: CPT

## 2019-01-01 PROCEDURE — 63600175 PHARM REV CODE 636 W HCPCS: Performed by: HOSPITALIST

## 2019-01-01 PROCEDURE — 83735 ASSAY OF MAGNESIUM: CPT

## 2019-01-01 PROCEDURE — 63600175 PHARM REV CODE 636 W HCPCS: Mod: JG | Performed by: INTERNAL MEDICINE

## 2019-01-01 PROCEDURE — 82803 BLOOD GASES ANY COMBINATION: CPT

## 2019-01-01 PROCEDURE — 99238 PR HOSPITAL DISCHARGE DAY,<30 MIN: ICD-10-PCS | Mod: ,,, | Performed by: INTERNAL MEDICINE

## 2019-01-01 PROCEDURE — 84100 ASSAY OF PHOSPHORUS: CPT | Mod: 91

## 2019-01-01 PROCEDURE — 83880 ASSAY OF NATRIURETIC PEPTIDE: CPT

## 2019-01-01 PROCEDURE — 84100 ASSAY OF PHOSPHORUS: CPT

## 2019-01-01 PROCEDURE — 99152 PR MOD CONSCIOUS SEDATION, SAME PHYS, 5+ YRS, FIRST 15 MIN: ICD-10-PCS | Mod: ,,, | Performed by: INTERNAL MEDICINE

## 2019-01-01 PROCEDURE — 63600367 HC NITRIC OXIDE PER HOUR

## 2019-01-01 PROCEDURE — 80053 COMPREHEN METABOLIC PANEL: CPT

## 2019-01-01 PROCEDURE — 36556 INSERT NON-TUNNEL CV CATH: CPT

## 2019-01-01 PROCEDURE — 93456 R HRT CORONARY ARTERY ANGIO: CPT | Mod: 26,59 | Performed by: INTERNAL MEDICINE

## 2019-01-01 PROCEDURE — 99291 PR CRITICAL CARE, E/M 30-74 MINUTES: ICD-10-PCS | Mod: ,,, | Performed by: INTERNAL MEDICINE

## 2019-01-01 PROCEDURE — 71046 XR CHEST PA AND LATERAL: ICD-10-PCS | Mod: 26,,, | Performed by: RADIOLOGY

## 2019-01-01 PROCEDURE — 83930 ASSAY OF BLOOD OSMOLALITY: CPT

## 2019-01-01 PROCEDURE — 63600175 PHARM REV CODE 636 W HCPCS: Performed by: EMERGENCY MEDICINE

## 2019-01-01 PROCEDURE — 93010 EKG 12-LEAD: ICD-10-PCS | Mod: 76,,, | Performed by: INTERNAL MEDICINE

## 2019-01-01 PROCEDURE — 36600 WITHDRAWAL OF ARTERIAL BLOOD: CPT

## 2019-01-01 PROCEDURE — 87040 BLOOD CULTURE FOR BACTERIA: CPT | Mod: 59

## 2019-01-01 PROCEDURE — 92928 PRQ TCAT PLMT NTRAC ST 1 LES: CPT | Mod: LD,,, | Performed by: INTERNAL MEDICINE

## 2019-01-01 PROCEDURE — 99291 CRITICAL CARE FIRST HOUR: CPT | Mod: 25

## 2019-01-01 PROCEDURE — 27000190 HC CPAP FULL FACE MASK W/VALVE

## 2019-01-01 PROCEDURE — C9600 PERC DRUG-EL COR STENT SING: HCPCS | Mod: LD | Performed by: INTERNAL MEDICINE

## 2019-01-01 PROCEDURE — 3075F SYST BP GE 130 - 139MM HG: CPT | Mod: CPTII,S$GLB,, | Performed by: FAMILY MEDICINE

## 2019-01-01 PROCEDURE — 99238 HOSP IP/OBS DSCHRG MGMT 30/<: CPT | Mod: ,,, | Performed by: INTERNAL MEDICINE

## 2019-01-01 PROCEDURE — 80061 LIPID PANEL: CPT

## 2019-01-01 PROCEDURE — 99152 MOD SED SAME PHYS/QHP 5/>YRS: CPT | Mod: ,,, | Performed by: INTERNAL MEDICINE

## 2019-01-01 PROCEDURE — 83605 ASSAY OF LACTIC ACID: CPT | Mod: 91

## 2019-01-01 PROCEDURE — 99232 SBSQ HOSP IP/OBS MODERATE 35: CPT | Mod: ,,, | Performed by: INTERNAL MEDICINE

## 2019-01-01 PROCEDURE — 99233 SBSQ HOSP IP/OBS HIGH 50: CPT | Mod: ,,, | Performed by: INTERNAL MEDICINE

## 2019-01-01 PROCEDURE — 92250 DIABETIC EYE SCREENING PHOTO: ICD-10-PCS | Mod: S$GLB,,, | Performed by: OPHTHALMOLOGY

## 2019-01-01 PROCEDURE — 99233 SBSQ HOSP IP/OBS HIGH 50: CPT | Mod: ,,, | Performed by: NURSE PRACTITIONER

## 2019-01-01 PROCEDURE — 3079F PR MOST RECENT DIASTOLIC BLOOD PRESSURE 80-89 MM HG: ICD-10-PCS | Mod: CPTII,S$GLB,, | Performed by: FAMILY MEDICINE

## 2019-01-01 PROCEDURE — 25000003 PHARM REV CODE 250: Performed by: EMERGENCY MEDICINE

## 2019-01-01 PROCEDURE — 85730 THROMBOPLASTIN TIME PARTIAL: CPT | Mod: 91

## 2019-01-01 PROCEDURE — 92250 FUNDUS PHOTOGRAPHY W/I&R: CPT | Mod: S$GLB,,, | Performed by: OPHTHALMOLOGY

## 2019-01-01 PROCEDURE — 99223 PR INITIAL HOSPITAL CARE,LEVL III: ICD-10-PCS | Mod: ,,, | Performed by: INTERNAL MEDICINE

## 2019-01-01 PROCEDURE — 92928 PR STENT: ICD-10-PCS | Mod: LD,,, | Performed by: INTERNAL MEDICINE

## 2019-01-01 PROCEDURE — 84484 ASSAY OF TROPONIN QUANT: CPT

## 2019-01-01 PROCEDURE — 99233 PR SUBSEQUENT HOSPITAL CARE,LEVL III: ICD-10-PCS | Mod: ,,, | Performed by: INTERNAL MEDICINE

## 2019-01-01 PROCEDURE — 80053 COMPREHEN METABOLIC PANEL: CPT | Mod: 91

## 2019-01-01 PROCEDURE — 33967 INSERT I-AORT PERCUT DEVICE: CPT | Performed by: INTERNAL MEDICINE

## 2019-01-01 PROCEDURE — 3079F DIAST BP 80-89 MM HG: CPT | Mod: CPTII,S$GLB,, | Performed by: FAMILY MEDICINE

## 2019-01-01 PROCEDURE — 94640 PR INHAL RX, AIRWAY OBST/DX SPUTUM INDUCT: ICD-10-PCS | Mod: S$GLB,,, | Performed by: FAMILY MEDICINE

## 2019-01-01 PROCEDURE — 99223 1ST HOSP IP/OBS HIGH 75: CPT | Mod: ,,, | Performed by: INTERNAL MEDICINE

## 2019-01-01 PROCEDURE — 84300 ASSAY OF URINE SODIUM: CPT

## 2019-01-01 PROCEDURE — 3075F PR MOST RECENT SYSTOLIC BLOOD PRESS GE 130-139MM HG: ICD-10-PCS | Mod: CPTII,S$GLB,, | Performed by: FAMILY MEDICINE

## 2019-01-01 PROCEDURE — 92978 ENDOLUMINL IVUS OCT C 1ST: CPT | Mod: 26,LD,, | Performed by: INTERNAL MEDICINE

## 2019-01-01 PROCEDURE — 83036 HEMOGLOBIN GLYCOSYLATED A1C: CPT

## 2019-01-01 PROCEDURE — 99292 CRITICAL CARE ADDL 30 MIN: CPT | Mod: ,,, | Performed by: EMERGENCY MEDICINE

## 2019-01-01 PROCEDURE — 93005 ELECTROCARDIOGRAM TRACING: CPT

## 2019-01-01 PROCEDURE — C1751 CATH, INF, PER/CENT/MIDLINE: HCPCS

## 2019-01-01 PROCEDURE — 93010 ELECTROCARDIOGRAM REPORT: CPT | Mod: ,,, | Performed by: INTERNAL MEDICINE

## 2019-01-01 PROCEDURE — 33967 INSERT I-AORT PERCUT DEVICE: CPT | Mod: ,,, | Performed by: INTERNAL MEDICINE

## 2019-01-01 PROCEDURE — C1725 CATH, TRANSLUMIN NON-LASER: HCPCS | Performed by: INTERNAL MEDICINE

## 2019-01-01 PROCEDURE — 99291 PR CRITICAL CARE, E/M 30-74 MINUTES: ICD-10-PCS | Mod: ,,, | Performed by: NURSE PRACTITIONER

## 2019-01-01 PROCEDURE — 71046 X-RAY EXAM CHEST 2 VIEWS: CPT | Mod: 26,,, | Performed by: RADIOLOGY

## 2019-01-01 PROCEDURE — 93456 PR CATH PLACE/CORONARY ANGIO, IMG SUPER/INTERP,W RIGHT HEART CATH: ICD-10-PCS | Mod: 26,59,, | Performed by: INTERNAL MEDICINE

## 2019-01-01 PROCEDURE — 99291 CRITICAL CARE FIRST HOUR: CPT | Mod: ,,, | Performed by: NURSE PRACTITIONER

## 2019-01-01 PROCEDURE — 93010 EKG 12-LEAD: ICD-10-PCS | Mod: ,,, | Performed by: INTERNAL MEDICINE

## 2019-01-01 PROCEDURE — 3008F PR BODY MASS INDEX (BMI) DOCUMENTED: ICD-10-PCS | Mod: CPTII,S$GLB,, | Performed by: FAMILY MEDICINE

## 2019-01-01 PROCEDURE — C1769 GUIDE WIRE: HCPCS | Performed by: INTERNAL MEDICINE

## 2019-01-01 PROCEDURE — 99999 PR PBB SHADOW E&M-EST. PATIENT-LVL IV: CPT | Mod: PBBFAC,,, | Performed by: FAMILY MEDICINE

## 2019-01-01 PROCEDURE — 99255 IP/OBS CONSLTJ NEW/EST HI 80: CPT | Mod: ,,, | Performed by: NURSE PRACTITIONER

## 2019-01-01 PROCEDURE — C1757 CATH, THROMBECTOMY/EMBOLECT: HCPCS | Performed by: INTERNAL MEDICINE

## 2019-01-01 PROCEDURE — 87449 NOS EACH ORGANISM AG IA: CPT

## 2019-01-01 PROCEDURE — 27200966 HC CLOSED SUCTION SYSTEM

## 2019-01-01 PROCEDURE — 92978 PR IVUS, CORONARY, 1ST VESSEL: ICD-10-PCS | Mod: 26,LD,, | Performed by: INTERNAL MEDICINE

## 2019-01-01 PROCEDURE — 99204 OFFICE O/P NEW MOD 45 MIN: CPT | Mod: 25,S$GLB,, | Performed by: FAMILY MEDICINE

## 2019-01-01 PROCEDURE — 33967 PR IABP INSERTION: ICD-10-PCS | Mod: ,,, | Performed by: INTERNAL MEDICINE

## 2019-01-01 PROCEDURE — 93458 L HRT ARTERY/VENTRICLE ANGIO: CPT | Mod: 26,59,, | Performed by: INTERNAL MEDICINE

## 2019-01-01 PROCEDURE — 99291 CRITICAL CARE FIRST HOUR: CPT | Mod: ,,, | Performed by: INTERNAL MEDICINE

## 2019-01-01 PROCEDURE — C1887 CATHETER, GUIDING: HCPCS | Performed by: INTERNAL MEDICINE

## 2019-01-01 PROCEDURE — 93010 EKG 12-LEAD: ICD-10-PCS | Mod: 77,,, | Performed by: INTERNAL MEDICINE

## 2019-01-01 PROCEDURE — 96374 THER/PROPH/DIAG INJ IV PUSH: CPT

## 2019-01-01 PROCEDURE — 27200188 HC TRANSDUCER, ART ADULT/PEDS

## 2019-01-01 PROCEDURE — 83935 ASSAY OF URINE OSMOLALITY: CPT

## 2019-01-01 PROCEDURE — 96372 THER/PROPH/DIAG INJ SC/IM: CPT | Mod: 59

## 2019-01-01 PROCEDURE — 25000003 PHARM REV CODE 250

## 2019-01-01 PROCEDURE — 92973 PRQ TRLUML C MCHN ASP THRMBC: CPT | Performed by: INTERNAL MEDICINE

## 2019-01-01 PROCEDURE — 99255 PR INITIAL INPATIENT CONSULT,LEVL V: ICD-10-PCS | Mod: ,,, | Performed by: NURSE PRACTITIONER

## 2019-01-01 PROCEDURE — 63600175 PHARM REV CODE 636 W HCPCS

## 2019-01-01 PROCEDURE — 63600175 PHARM REV CODE 636 W HCPCS: Performed by: NURSE PRACTITIONER

## 2019-01-01 PROCEDURE — 99233 PR SUBSEQUENT HOSPITAL CARE,LEVL III: ICD-10-PCS | Mod: ,,, | Performed by: NURSE PRACTITIONER

## 2019-01-01 PROCEDURE — 99152 MOD SED SAME PHYS/QHP 5/>YRS: CPT | Performed by: INTERNAL MEDICINE

## 2019-01-01 PROCEDURE — 94640 AIRWAY INHALATION TREATMENT: CPT | Mod: S$GLB,,, | Performed by: FAMILY MEDICINE

## 2019-01-01 PROCEDURE — 93458 PR CATH PLACE/CORON ANGIO, IMG SUPER/INTERP,W LEFT HEART VENTRICULOGRAPHY: ICD-10-PCS | Mod: 26,59,, | Performed by: INTERNAL MEDICINE

## 2019-01-01 PROCEDURE — S5571 INSULIN DISPOS PEN 3 ML: HCPCS | Performed by: HOSPITALIST

## 2019-01-01 PROCEDURE — 84484 ASSAY OF TROPONIN QUANT: CPT | Mod: 91

## 2019-01-01 PROCEDURE — 93456 R HRT CORONARY ARTERY ANGIO: CPT | Mod: 26,59,, | Performed by: INTERNAL MEDICINE

## 2019-01-01 PROCEDURE — 84145 PROCALCITONIN (PCT): CPT

## 2019-01-01 PROCEDURE — 3008F BODY MASS INDEX DOCD: CPT | Mod: CPTII,S$GLB,, | Performed by: FAMILY MEDICINE

## 2019-01-01 PROCEDURE — C9600 PERC DRUG-EL COR STENT SING: HCPCS | Performed by: INTERNAL MEDICINE

## 2019-01-01 PROCEDURE — S5571 INSULIN DISPOS PEN 3 ML: HCPCS | Performed by: INTERNAL MEDICINE

## 2019-01-01 PROCEDURE — 99291 PR CRITICAL CARE, E/M 30-74 MINUTES: ICD-10-PCS | Mod: ,,, | Performed by: EMERGENCY MEDICINE

## 2019-01-01 PROCEDURE — 99291 CRITICAL CARE FIRST HOUR: CPT | Mod: ,,, | Performed by: EMERGENCY MEDICINE

## 2019-01-01 PROCEDURE — 93010 ELECTROCARDIOGRAM REPORT: CPT | Mod: 76,,, | Performed by: INTERNAL MEDICINE

## 2019-01-01 PROCEDURE — 80074 ACUTE HEPATITIS PANEL: CPT

## 2019-01-01 PROCEDURE — 96375 TX/PRO/DX INJ NEW DRUG ADDON: CPT

## 2019-01-01 PROCEDURE — 84443 ASSAY THYROID STIM HORMONE: CPT

## 2019-01-01 PROCEDURE — 99999 PR PBB SHADOW E&M-EST. PATIENT-LVL IV: ICD-10-PCS | Mod: PBBFAC,,, | Performed by: FAMILY MEDICINE

## 2019-01-01 PROCEDURE — 71046 X-RAY EXAM CHEST 2 VIEWS: CPT | Mod: TC,FY,PO

## 2019-01-01 PROCEDURE — C1753 CATH, INTRAVAS ULTRASOUND: HCPCS | Performed by: INTERNAL MEDICINE

## 2019-01-01 PROCEDURE — 82010 KETONE BODYS QUAN: CPT

## 2019-01-01 PROCEDURE — 25500020 PHARM REV CODE 255: Performed by: INTERNAL MEDICINE

## 2019-01-01 PROCEDURE — 86901 BLOOD TYPING SEROLOGIC RH(D): CPT

## 2019-01-01 PROCEDURE — 94002 VENT MGMT INPAT INIT DAY: CPT

## 2019-01-01 PROCEDURE — 87205 SMEAR GRAM STAIN: CPT

## 2019-01-01 PROCEDURE — 99292 PR CRITICAL CARE, ADDL 30 MIN: ICD-10-PCS | Mod: ,,, | Performed by: EMERGENCY MEDICINE

## 2019-01-01 PROCEDURE — 83050 HGB METHEMOGLOBIN QUAN: CPT

## 2019-01-01 PROCEDURE — 93458 L HRT ARTERY/VENTRICLE ANGIO: CPT | Mod: 59 | Performed by: INTERNAL MEDICINE

## 2019-01-01 PROCEDURE — 99900026 HC AIRWAY MAINTENANCE (STAT)

## 2019-01-01 PROCEDURE — 36415 COLL VENOUS BLD VENIPUNCTURE: CPT | Mod: PO

## 2019-01-01 PROCEDURE — 81000 URINALYSIS NONAUTO W/SCOPE: CPT

## 2019-01-01 DEVICE — ANGIO-SEAL VIP VASCULAR CLOSURE DEVICE
Type: IMPLANTABLE DEVICE | Site: GROIN | Status: FUNCTIONAL
Brand: ANGIO-SEAL

## 2019-01-01 DEVICE — STENT RONYX40026UX RESOLUTE ONYX 4.00X26
Type: IMPLANTABLE DEVICE | Site: HEART | Status: FUNCTIONAL
Brand: RESOLUTE ONYX™

## 2019-01-01 DEVICE — STENT RONYX45015UX RESOLUTE ONYX 4.50X15
Type: IMPLANTABLE DEVICE | Site: CORONARY | Status: FUNCTIONAL
Brand: RESOLUTE ONYX™

## 2019-01-01 RX ORDER — MAGNESIUM SULFATE HEPTAHYDRATE 40 MG/ML
2 INJECTION, SOLUTION INTRAVENOUS ONCE
Status: COMPLETED | OUTPATIENT
Start: 2019-01-01 | End: 2019-01-01

## 2019-01-01 RX ORDER — IBUPROFEN 200 MG
24 TABLET ORAL
Status: DISCONTINUED | OUTPATIENT
Start: 2019-01-01 | End: 2019-01-01 | Stop reason: HOSPADM

## 2019-01-01 RX ORDER — AZITHROMYCIN 250 MG/1
500 TABLET, FILM COATED ORAL DAILY
Status: DISCONTINUED | OUTPATIENT
Start: 2019-01-01 | End: 2019-01-01

## 2019-01-01 RX ORDER — ACETAMINOPHEN 325 MG/1
650 TABLET ORAL EVERY 8 HOURS PRN
Status: DISCONTINUED | OUTPATIENT
Start: 2019-01-01 | End: 2019-01-01 | Stop reason: HOSPADM

## 2019-01-01 RX ORDER — DOBUTAMINE HYDROCHLORIDE 200 MG/100ML
INJECTION INTRAVENOUS
Status: COMPLETED
Start: 2019-01-01 | End: 2019-01-01

## 2019-01-01 RX ORDER — SODIUM CHLORIDE 9 MG/ML
3 INJECTION, SOLUTION INTRAVENOUS CONTINUOUS
Status: ACTIVE | OUTPATIENT
Start: 2019-01-01 | End: 2019-01-01

## 2019-01-01 RX ORDER — FUROSEMIDE 10 MG/ML
80 INJECTION INTRAMUSCULAR; INTRAVENOUS ONCE
Status: COMPLETED | OUTPATIENT
Start: 2019-01-01 | End: 2019-01-01

## 2019-01-01 RX ORDER — AMOXICILLIN 250 MG
1 CAPSULE ORAL 2 TIMES DAILY
Status: DISCONTINUED | OUTPATIENT
Start: 2019-01-01 | End: 2019-01-01 | Stop reason: HOSPADM

## 2019-01-01 RX ORDER — IPRATROPIUM BROMIDE AND ALBUTEROL SULFATE 2.5; .5 MG/3ML; MG/3ML
3 SOLUTION RESPIRATORY (INHALATION)
Status: COMPLETED | OUTPATIENT
Start: 2019-01-01 | End: 2019-01-01

## 2019-01-01 RX ORDER — FENTANYL CITRATE 50 UG/ML
INJECTION, SOLUTION INTRAMUSCULAR; INTRAVENOUS
Status: DISCONTINUED | OUTPATIENT
Start: 2019-01-01 | End: 2019-01-01 | Stop reason: HOSPADM

## 2019-01-01 RX ORDER — SODIUM CHLORIDE 0.9 % (FLUSH) 0.9 %
10 SYRINGE (ML) INJECTION
Status: DISCONTINUED | OUTPATIENT
Start: 2019-01-01 | End: 2019-01-01 | Stop reason: HOSPADM

## 2019-01-01 RX ORDER — POTASSIUM CHLORIDE 20 MEQ/1
40 TABLET, EXTENDED RELEASE ORAL ONCE
Status: DISCONTINUED | OUTPATIENT
Start: 2019-01-01 | End: 2019-01-01 | Stop reason: HOSPADM

## 2019-01-01 RX ORDER — ONDANSETRON 2 MG/ML
4 INJECTION INTRAMUSCULAR; INTRAVENOUS EVERY 4 HOURS PRN
Status: DISCONTINUED | OUTPATIENT
Start: 2019-01-01 | End: 2019-01-01 | Stop reason: HOSPADM

## 2019-01-01 RX ORDER — FUROSEMIDE 10 MG/ML
40 INJECTION INTRAMUSCULAR; INTRAVENOUS 2 TIMES DAILY
Status: DISCONTINUED | OUTPATIENT
Start: 2019-01-01 | End: 2019-01-01

## 2019-01-01 RX ORDER — POTASSIUM CHLORIDE 20 MEQ/1
TABLET, EXTENDED RELEASE ORAL
Status: DISPENSED
Start: 2019-01-01 | End: 2019-01-01

## 2019-01-01 RX ORDER — LIDOCAINE HYDROCHLORIDE 10 MG/ML
INJECTION, SOLUTION EPIDURAL; INFILTRATION; INTRACAUDAL; PERINEURAL
Status: DISCONTINUED | OUTPATIENT
Start: 2019-01-01 | End: 2019-01-01 | Stop reason: HOSPADM

## 2019-01-01 RX ORDER — DIPHENHYDRAMINE HCL 50 MG
50 CAPSULE ORAL ONCE
Status: COMPLETED | OUTPATIENT
Start: 2019-01-01 | End: 2019-01-01

## 2019-01-01 RX ORDER — ROCURONIUM BROMIDE 10 MG/ML
INJECTION, SOLUTION INTRAVENOUS
Status: COMPLETED
Start: 2019-01-01 | End: 2019-01-01

## 2019-01-01 RX ORDER — LIDOCAINE HYDROCHLORIDE ANHYDROUS AND DEXTROSE MONOHYDRATE .8; 5 G/100ML; G/100ML
2 INJECTION, SOLUTION INTRAVENOUS CONTINUOUS
Status: DISCONTINUED | OUTPATIENT
Start: 2019-01-01 | End: 2019-01-01 | Stop reason: HOSPADM

## 2019-01-01 RX ORDER — FENTANYL CITRATE 50 UG/ML
INJECTION, SOLUTION INTRAMUSCULAR; INTRAVENOUS
Status: COMPLETED
Start: 2019-01-01 | End: 2019-01-01

## 2019-01-01 RX ORDER — MORPHINE SULFATE 2 MG/ML
INJECTION, SOLUTION INTRAMUSCULAR; INTRAVENOUS
Status: DISCONTINUED
Start: 2019-01-01 | End: 2019-01-01 | Stop reason: WASHOUT

## 2019-01-01 RX ORDER — FUROSEMIDE 10 MG/ML
80 INJECTION INTRAMUSCULAR; INTRAVENOUS 2 TIMES DAILY
Status: DISCONTINUED | OUTPATIENT
Start: 2019-01-01 | End: 2019-01-01

## 2019-01-01 RX ORDER — POTASSIUM CHLORIDE 14.9 MG/ML
20 INJECTION INTRAVENOUS ONCE
Status: COMPLETED | OUTPATIENT
Start: 2019-01-01 | End: 2019-01-01

## 2019-01-01 RX ORDER — NITROGLYCERIN 20 MG/100ML
20 INJECTION INTRAVENOUS CONTINUOUS
Status: DISCONTINUED | OUTPATIENT
Start: 2019-01-01 | End: 2019-01-01

## 2019-01-01 RX ORDER — FUROSEMIDE 10 MG/ML
INJECTION INTRAMUSCULAR; INTRAVENOUS
Status: COMPLETED
Start: 2019-01-01 | End: 2019-01-01

## 2019-01-01 RX ORDER — INSULIN ASPART 100 [IU]/ML
1-10 INJECTION, SOLUTION INTRAVENOUS; SUBCUTANEOUS
Status: DISCONTINUED | OUTPATIENT
Start: 2019-01-01 | End: 2019-01-01 | Stop reason: HOSPADM

## 2019-01-01 RX ORDER — FENTANYL CITRATE-0.9 % NACL/PF 10 MCG/ML
PLASTIC BAG, INJECTION (ML) INTRAVENOUS CONTINUOUS
Status: DISCONTINUED | OUTPATIENT
Start: 2019-01-01 | End: 2019-01-01 | Stop reason: HOSPADM

## 2019-01-01 RX ORDER — HEPARIN SODIUM,PORCINE/D5W 25000/250
12 INTRAVENOUS SOLUTION INTRAVENOUS CONTINUOUS
Status: DISCONTINUED | OUTPATIENT
Start: 2019-01-01 | End: 2019-01-01 | Stop reason: HOSPADM

## 2019-01-01 RX ORDER — ASPIRIN 325 MG
325 TABLET ORAL
Status: COMPLETED | OUTPATIENT
Start: 2019-01-01 | End: 2019-01-01

## 2019-01-01 RX ORDER — GLIMEPIRIDE 4 MG/1
4 TABLET ORAL
Qty: 90 TABLET | Refills: 0 | Status: SHIPPED | OUTPATIENT
Start: 2019-01-01 | End: 2019-01-01 | Stop reason: HOSPADM

## 2019-01-01 RX ORDER — LORAZEPAM/0.9% SODIUM CHLORIDE 100MG/0.1L
2 PLASTIC BAG, INJECTION (ML) INTRAVENOUS
Status: ACTIVE | OUTPATIENT
Start: 2019-01-01 | End: 2019-01-01

## 2019-01-01 RX ORDER — INSULIN PUMP SYRINGE, 3 ML
EACH MISCELLANEOUS
Qty: 1 EACH | Refills: 0 | Status: SHIPPED | OUTPATIENT
Start: 2019-01-01 | End: 2019-01-01 | Stop reason: HOSPADM

## 2019-01-01 RX ORDER — NITROGLYCERIN 20 MG/100ML
5 INJECTION INTRAVENOUS CONTINUOUS
Status: DISCONTINUED | OUTPATIENT
Start: 2019-01-01 | End: 2019-01-01

## 2019-01-01 RX ORDER — FUROSEMIDE 10 MG/ML
INJECTION INTRAMUSCULAR; INTRAVENOUS
Status: DISCONTINUED | OUTPATIENT
Start: 2019-01-01 | End: 2019-01-01 | Stop reason: HOSPADM

## 2019-01-01 RX ORDER — MORPHINE SULFATE 10 MG/ML
2 INJECTION INTRAMUSCULAR; INTRAVENOUS; SUBCUTANEOUS ONCE
Status: COMPLETED | OUTPATIENT
Start: 2019-01-01 | End: 2019-01-01

## 2019-01-01 RX ORDER — POTASSIUM CHLORIDE 20 MEQ/1
40 TABLET, EXTENDED RELEASE ORAL ONCE
Status: COMPLETED | OUTPATIENT
Start: 2019-01-01 | End: 2019-01-01

## 2019-01-01 RX ORDER — FUROSEMIDE 10 MG/ML
80 INJECTION INTRAMUSCULAR; INTRAVENOUS
Status: COMPLETED | OUTPATIENT
Start: 2019-01-01 | End: 2019-01-01

## 2019-01-01 RX ORDER — NAPROXEN SODIUM 220 MG/1
81 TABLET, FILM COATED ORAL DAILY
Status: DISCONTINUED | OUTPATIENT
Start: 2019-01-01 | End: 2019-01-01 | Stop reason: HOSPADM

## 2019-01-01 RX ORDER — POLYETHYLENE GLYCOL 3350 17 G/17G
17 POWDER, FOR SOLUTION ORAL DAILY
Status: DISCONTINUED | OUTPATIENT
Start: 2019-01-01 | End: 2019-01-01 | Stop reason: HOSPADM

## 2019-01-01 RX ORDER — LISINOPRIL 2.5 MG/1
2.5 TABLET ORAL 2 TIMES DAILY
Status: DISCONTINUED | OUTPATIENT
Start: 2019-01-01 | End: 2019-01-01

## 2019-01-01 RX ORDER — ETOMIDATE 2 MG/ML
INJECTION INTRAVENOUS
Status: COMPLETED
Start: 2019-01-01 | End: 2019-01-01

## 2019-01-01 RX ORDER — INSULIN ASPART 100 [IU]/ML
0-5 INJECTION, SOLUTION INTRAVENOUS; SUBCUTANEOUS EVERY 6 HOURS PRN
Status: CANCELLED | OUTPATIENT
Start: 2019-01-01

## 2019-01-01 RX ORDER — PHENYLEPHRINE HCL IN 0.9% NACL 1 MG/10 ML
SYRINGE (ML) INTRAVENOUS
Status: COMPLETED
Start: 2019-01-01 | End: 2019-01-01

## 2019-01-01 RX ORDER — ALPRAZOLAM 0.5 MG/1
1 TABLET ORAL 3 TIMES DAILY PRN
Status: DISCONTINUED | OUTPATIENT
Start: 2019-01-01 | End: 2019-01-01 | Stop reason: HOSPADM

## 2019-01-01 RX ORDER — CARVEDILOL 3.12 MG/1
3.12 TABLET ORAL 2 TIMES DAILY
Status: DISCONTINUED | OUTPATIENT
Start: 2019-01-01 | End: 2019-01-01

## 2019-01-01 RX ORDER — OXYCODONE AND ACETAMINOPHEN 5; 325 MG/1; MG/1
1 TABLET ORAL EVERY 6 HOURS PRN
Status: DISCONTINUED | OUTPATIENT
Start: 2019-01-01 | End: 2019-01-01 | Stop reason: HOSPADM

## 2019-01-01 RX ORDER — NOREPINEPHRINE BITARTRATE 1 MG/ML
INJECTION, SOLUTION INTRAVENOUS
Status: COMPLETED
Start: 2019-01-01 | End: 2019-01-01

## 2019-01-01 RX ORDER — ENALAPRILAT 1.25 MG/ML
1.25 INJECTION INTRAVENOUS EVERY 6 HOURS
Status: DISCONTINUED | OUTPATIENT
Start: 2019-01-01 | End: 2019-01-01

## 2019-01-01 RX ORDER — MIDAZOLAM HYDROCHLORIDE 1 MG/ML
INJECTION, SOLUTION INTRAMUSCULAR; INTRAVENOUS
Status: DISCONTINUED | OUTPATIENT
Start: 2019-01-01 | End: 2019-01-01 | Stop reason: HOSPADM

## 2019-01-01 RX ORDER — MIDAZOLAM HYDROCHLORIDE 1 MG/ML
INJECTION INTRAMUSCULAR; INTRAVENOUS
Status: COMPLETED
Start: 2019-01-01 | End: 2019-01-01

## 2019-01-01 RX ORDER — IRBESARTAN 75 MG/1
75 TABLET ORAL DAILY
Qty: 90 TABLET | Refills: 3 | Status: SHIPPED | OUTPATIENT
Start: 2019-01-01 | End: 2019-01-01 | Stop reason: HOSPADM

## 2019-01-01 RX ORDER — GLUCAGON 1 MG
1 KIT INJECTION
Status: DISCONTINUED | OUTPATIENT
Start: 2019-01-01 | End: 2019-01-01 | Stop reason: HOSPADM

## 2019-01-01 RX ORDER — EPINEPHRINE 1 MG/ML
INJECTION INTRAMUSCULAR; INTRAVENOUS; SUBCUTANEOUS
Status: COMPLETED
Start: 2019-01-01 | End: 2019-01-01

## 2019-01-01 RX ORDER — SUCCINYLCHOLINE CHLORIDE 20 MG/ML
INJECTION INTRAMUSCULAR; INTRAVENOUS
Status: COMPLETED
Start: 2019-01-01 | End: 2019-01-01

## 2019-01-01 RX ORDER — FUROSEMIDE 20 MG/1
20 TABLET ORAL DAILY PRN
Qty: 30 TABLET | Refills: 1 | Status: SHIPPED | OUTPATIENT
Start: 2019-01-01 | End: 2019-01-01 | Stop reason: HOSPADM

## 2019-01-01 RX ORDER — PROPOFOL 10 MG/ML
INJECTION, EMULSION INTRAVENOUS
Status: COMPLETED
Start: 2019-01-01 | End: 2019-01-01

## 2019-01-01 RX ORDER — VANCOMYCIN HCL IN 5 % DEXTROSE 1.25 G/25
15 PLASTIC BAG, INJECTION (ML) INTRAVENOUS
Status: DISCONTINUED | OUTPATIENT
Start: 2019-01-01 | End: 2019-01-01 | Stop reason: HOSPADM

## 2019-01-01 RX ORDER — METOPROLOL TARTRATE 1 MG/ML
5 INJECTION, SOLUTION INTRAVENOUS
Status: COMPLETED | OUTPATIENT
Start: 2019-01-01 | End: 2019-01-01

## 2019-01-01 RX ORDER — IRBESARTAN 75 MG/1
75 TABLET ORAL DAILY
Status: DISCONTINUED | OUTPATIENT
Start: 2019-01-01 | End: 2019-01-01

## 2019-01-01 RX ORDER — ENOXAPARIN SODIUM 150 MG/ML
1 INJECTION SUBCUTANEOUS
Status: DISCONTINUED | OUTPATIENT
Start: 2019-01-01 | End: 2019-01-01

## 2019-01-01 RX ORDER — POTASSIUM CHLORIDE 750 MG/1
50 TABLET, EXTENDED RELEASE ORAL ONCE
Status: COMPLETED | OUTPATIENT
Start: 2019-01-01 | End: 2019-01-01

## 2019-01-01 RX ORDER — ENOXAPARIN SODIUM 150 MG/ML
1 INJECTION SUBCUTANEOUS
Status: COMPLETED | OUTPATIENT
Start: 2019-01-01 | End: 2019-01-01

## 2019-01-01 RX ORDER — AMIODARONE HYDROCHLORIDE 150 MG/3ML
INJECTION, SOLUTION INTRAVENOUS CODE/TRAUMA/SEDATION MEDICATION
Status: COMPLETED | OUTPATIENT
Start: 2019-01-01 | End: 2019-01-01

## 2019-01-01 RX ORDER — DOBUTAMINE HYDROCHLORIDE 400 MG/100ML
2.5 INJECTION, SOLUTION INTRAVENOUS CONTINUOUS
Status: DISCONTINUED | OUTPATIENT
Start: 2019-01-01 | End: 2019-01-01 | Stop reason: HOSPADM

## 2019-01-01 RX ORDER — MAGNESIUM SULFATE HEPTAHYDRATE 40 MG/ML
INJECTION, SOLUTION INTRAVENOUS
Status: DISPENSED
Start: 2019-01-01 | End: 2019-01-01

## 2019-01-01 RX ORDER — POTASSIUM CHLORIDE 29.8 MG/ML
40 INJECTION INTRAVENOUS ONCE
Status: COMPLETED | OUTPATIENT
Start: 2019-01-01 | End: 2019-01-01

## 2019-01-01 RX ORDER — PROPOFOL 10 MG/ML
15 INJECTION, EMULSION INTRAVENOUS CONTINUOUS
Status: DISCONTINUED | OUTPATIENT
Start: 2019-01-01 | End: 2019-01-01 | Stop reason: HOSPADM

## 2019-01-01 RX ORDER — GLUCAGON 1 MG
1 KIT INJECTION
Status: CANCELLED | OUTPATIENT
Start: 2019-01-01

## 2019-01-01 RX ORDER — MORPHINE SULFATE 2 MG/ML
INJECTION, SOLUTION INTRAMUSCULAR; INTRAVENOUS
Status: COMPLETED
Start: 2019-01-01 | End: 2019-01-01

## 2019-01-01 RX ORDER — CEFTRIAXONE 1 G/1
1 INJECTION, POWDER, FOR SOLUTION INTRAMUSCULAR; INTRAVENOUS
Status: DISCONTINUED | OUTPATIENT
Start: 2019-01-01 | End: 2019-01-01

## 2019-01-01 RX ORDER — FUROSEMIDE 10 MG/ML
80 INJECTION INTRAMUSCULAR; INTRAVENOUS 3 TIMES DAILY
Status: DISCONTINUED | OUTPATIENT
Start: 2019-01-01 | End: 2019-01-01

## 2019-01-01 RX ORDER — NITROGLYCERIN 0.4 MG/1
0.4 TABLET SUBLINGUAL EVERY 5 MIN PRN
Status: DISCONTINUED | OUTPATIENT
Start: 2019-01-01 | End: 2019-01-01 | Stop reason: HOSPADM

## 2019-01-01 RX ORDER — FAMOTIDINE 20 MG/1
20 TABLET, FILM COATED ORAL 2 TIMES DAILY
Status: DISCONTINUED | OUTPATIENT
Start: 2019-01-01 | End: 2019-01-01 | Stop reason: HOSPADM

## 2019-01-01 RX ORDER — LEVOFLOXACIN 750 MG/1
750 TABLET ORAL DAILY
Status: DISCONTINUED | OUTPATIENT
Start: 2019-01-01 | End: 2019-01-01

## 2019-01-01 RX ORDER — CLOPIDOGREL 300 MG/1
300 TABLET, FILM COATED ORAL
Status: COMPLETED | OUTPATIENT
Start: 2019-01-01 | End: 2019-01-01

## 2019-01-01 RX ORDER — PHENYLEPHRINE HCL IN 0.9% NACL 1 MG/10 ML
SYRINGE (ML) INTRAVENOUS
Status: DISCONTINUED
Start: 2019-01-01 | End: 2019-01-01 | Stop reason: WASHOUT

## 2019-01-01 RX ORDER — LISINOPRIL 2.5 MG/1
2.5 TABLET ORAL DAILY
Status: DISCONTINUED | OUTPATIENT
Start: 2019-01-01 | End: 2019-01-01

## 2019-01-01 RX ORDER — HEPARIN SODIUM 1000 [USP'U]/ML
INJECTION, SOLUTION INTRAVENOUS; SUBCUTANEOUS
Status: DISCONTINUED | OUTPATIENT
Start: 2019-01-01 | End: 2019-01-01 | Stop reason: HOSPADM

## 2019-01-01 RX ORDER — CEFEPIME HYDROCHLORIDE 1 G/1
1 INJECTION, POWDER, FOR SOLUTION INTRAMUSCULAR; INTRAVENOUS
Status: DISCONTINUED | OUTPATIENT
Start: 2019-01-01 | End: 2019-01-01 | Stop reason: HOSPADM

## 2019-01-01 RX ORDER — TIROFIBAN HYDROCHLORIDE 50 UG/ML
INJECTION INTRAVENOUS
Status: DISCONTINUED | OUTPATIENT
Start: 2019-01-01 | End: 2019-01-01 | Stop reason: HOSPADM

## 2019-01-01 RX ORDER — LANCETS
1 EACH MISCELLANEOUS 3 TIMES DAILY
Qty: 100 EACH | Refills: 5 | Status: SHIPPED | OUTPATIENT
Start: 2019-01-01 | End: 2019-01-01 | Stop reason: HOSPADM

## 2019-01-01 RX ORDER — FAMOTIDINE 20 MG/1
20 TABLET, FILM COATED ORAL DAILY
Status: DISCONTINUED | OUTPATIENT
Start: 2019-01-01 | End: 2019-01-01

## 2019-01-01 RX ORDER — IBUPROFEN 200 MG
16 TABLET ORAL
Status: DISCONTINUED | OUTPATIENT
Start: 2019-01-01 | End: 2019-01-01 | Stop reason: HOSPADM

## 2019-01-01 RX ORDER — MORPHINE SULFATE 10 MG/ML
2 INJECTION INTRAMUSCULAR; INTRAVENOUS; SUBCUTANEOUS EVERY 4 HOURS PRN
Status: DISCONTINUED | OUTPATIENT
Start: 2019-01-01 | End: 2019-01-01 | Stop reason: HOSPADM

## 2019-01-01 RX ORDER — ATORVASTATIN CALCIUM 20 MG/1
40 TABLET, FILM COATED ORAL DAILY
Status: DISCONTINUED | OUTPATIENT
Start: 2019-01-01 | End: 2019-01-01 | Stop reason: HOSPADM

## 2019-01-01 RX ORDER — CLOPIDOGREL BISULFATE 75 MG/1
75 TABLET ORAL DAILY
Status: DISCONTINUED | OUTPATIENT
Start: 2019-01-01 | End: 2019-01-01 | Stop reason: HOSPADM

## 2019-01-01 RX ORDER — ENALAPRILAT 1.25 MG/ML
1.25 INJECTION INTRAVENOUS
Status: COMPLETED | OUTPATIENT
Start: 2019-01-01 | End: 2019-01-01

## 2019-01-01 RX ORDER — ATROPINE SULFATE 0.1 MG/ML
1 INJECTION INTRAVENOUS ONCE
Status: COMPLETED | OUTPATIENT
Start: 2019-01-01 | End: 2019-01-01

## 2019-01-01 RX ADMIN — FUROSEMIDE 80 MG: 10 INJECTION, SOLUTION INTRAMUSCULAR; INTRAVENOUS at 08:04

## 2019-01-01 RX ADMIN — FUROSEMIDE 80 MG: 10 INJECTION, SOLUTION INTRAMUSCULAR; INTRAVENOUS at 09:04

## 2019-01-01 RX ADMIN — FUROSEMIDE 40 MG: 10 INJECTION, SOLUTION INTRAVENOUS at 11:04

## 2019-01-01 RX ADMIN — AZITHROMYCIN 500 MG: 250 TABLET, FILM COATED ORAL at 10:04

## 2019-01-01 RX ADMIN — ENALAPRILAT 1.25 MG: 2.5 INJECTION INTRAVENOUS at 02:04

## 2019-01-01 RX ADMIN — AMIODARONE HYDROCHLORIDE 1 MG/MIN: 1.8 INJECTION, SOLUTION INTRAVENOUS at 08:04

## 2019-01-01 RX ADMIN — FUROSEMIDE 80 MG: 10 INJECTION, SOLUTION INTRAMUSCULAR; INTRAVENOUS at 06:04

## 2019-01-01 RX ADMIN — LIDOCAINE HYDROCHLORIDE 1 MG/MIN: 8 INJECTION, SOLUTION INTRAVENOUS at 08:04

## 2019-01-01 RX ADMIN — CARVEDILOL 3.12 MG: 3.12 TABLET, FILM COATED ORAL at 08:04

## 2019-01-01 RX ADMIN — PROPOFOL 15 MCG/KG/MIN: 10 INJECTION, EMULSION INTRAVENOUS at 05:04

## 2019-01-01 RX ADMIN — AMIODARONE HYDROCHLORIDE 0.5 MG/MIN: 1.8 INJECTION, SOLUTION INTRAVENOUS at 03:04

## 2019-01-01 RX ADMIN — INSULIN ASPART 4 UNITS: 100 INJECTION, SOLUTION INTRAVENOUS; SUBCUTANEOUS at 05:04

## 2019-01-01 RX ADMIN — NITROGLYCERIN 2 INCH: 20 OINTMENT TOPICAL at 02:04

## 2019-01-01 RX ADMIN — CEFTRIAXONE SODIUM 1 G: 1 INJECTION, POWDER, FOR SOLUTION INTRAMUSCULAR; INTRAVENOUS at 10:04

## 2019-01-01 RX ADMIN — AMIODARONE HYDROCHLORIDE 1 MG/MIN: 1.8 INJECTION, SOLUTION INTRAVENOUS at 05:04

## 2019-01-01 RX ADMIN — MAGNESIUM SULFATE HEPTAHYDRATE 2 G: 40 INJECTION, SOLUTION INTRAVENOUS at 08:04

## 2019-01-01 RX ADMIN — MORPHINE SULFATE 2 MG: 10 INJECTION INTRAVENOUS at 03:04

## 2019-01-01 RX ADMIN — ATORVASTATIN CALCIUM 40 MG: 40 TABLET, FILM COATED ORAL at 09:04

## 2019-01-01 RX ADMIN — ATORVASTATIN CALCIUM 40 MG: 40 TABLET, FILM COATED ORAL at 08:04

## 2019-01-01 RX ADMIN — Medication 1250 MG: at 12:04

## 2019-01-01 RX ADMIN — POTASSIUM CHLORIDE 50 MEQ: 750 TABLET, EXTENDED RELEASE ORAL at 11:04

## 2019-01-01 RX ADMIN — POLYETHYLENE GLYCOL 3350 17 G: 17 POWDER, FOR SOLUTION ORAL at 04:04

## 2019-01-01 RX ADMIN — ASPIRIN 81 MG CHEWABLE TABLET 81 MG: 81 TABLET CHEWABLE at 08:04

## 2019-01-01 RX ADMIN — FAMOTIDINE 20 MG: 20 TABLET, FILM COATED ORAL at 09:04

## 2019-01-01 RX ADMIN — INSULIN ASPART 2 UNITS: 100 INJECTION, SOLUTION INTRAVENOUS; SUBCUTANEOUS at 06:04

## 2019-01-01 RX ADMIN — OXYCODONE AND ACETAMINOPHEN 1 TABLET: 5; 325 TABLET ORAL at 08:04

## 2019-01-01 RX ADMIN — INSULIN DETEMIR 10 UNITS: 100 INJECTION, SOLUTION SUBCUTANEOUS at 08:04

## 2019-01-01 RX ADMIN — FUROSEMIDE 80 MG: 10 INJECTION, SOLUTION INTRAMUSCULAR; INTRAVENOUS at 03:04

## 2019-01-01 RX ADMIN — AMIODARONE HYDROCHLORIDE 300 MG: 50 INJECTION, SOLUTION INTRAVENOUS at 01:04

## 2019-01-01 RX ADMIN — MORPHINE SULFATE 2 MG: 10 INJECTION INTRAVENOUS at 05:04

## 2019-01-01 RX ADMIN — Medication 100 MCG/HR: at 05:04

## 2019-01-01 RX ADMIN — AMIODARONE HYDROCHLORIDE 1 MG/MIN: 1.8 INJECTION, SOLUTION INTRAVENOUS at 02:04

## 2019-01-01 RX ADMIN — FAMOTIDINE 20 MG: 20 TABLET, FILM COATED ORAL at 08:04

## 2019-01-01 RX ADMIN — FENTANYL CITRATE 25 MCG: 50 INJECTION INTRAMUSCULAR; INTRAVENOUS at 04:04

## 2019-01-01 RX ADMIN — ETOMIDATE 180 MG: 2 INJECTION INTRAVENOUS at 03:04

## 2019-01-01 RX ADMIN — MAGNESIUM SULFATE HEPTAHYDRATE 1 G: 500 INJECTION, SOLUTION INTRAMUSCULAR; INTRAVENOUS at 08:04

## 2019-01-01 RX ADMIN — POTASSIUM CHLORIDE 40 MEQ: 1500 TABLET, EXTENDED RELEASE ORAL at 08:04

## 2019-01-01 RX ADMIN — LEVOFLOXACIN 750 MG: 750 TABLET, FILM COATED ORAL at 12:04

## 2019-01-01 RX ADMIN — PIPERACILLIN AND TAZOBACTAM 4.5 G: 4; .5 INJECTION, POWDER, LYOPHILIZED, FOR SOLUTION INTRAVENOUS; PARENTERAL at 07:04

## 2019-01-01 RX ADMIN — OXYCODONE AND ACETAMINOPHEN 1 TABLET: 5; 325 TABLET ORAL at 03:04

## 2019-01-01 RX ADMIN — FUROSEMIDE 30 MG/HR: 10 INJECTION, SOLUTION INTRAMUSCULAR; INTRAVENOUS at 12:04

## 2019-01-01 RX ADMIN — VANCOMYCIN HYDROCHLORIDE 1750 MG: 500 INJECTION, POWDER, LYOPHILIZED, FOR SOLUTION INTRAVENOUS at 11:04

## 2019-01-01 RX ADMIN — ACETAMINOPHEN 650 MG: 325 TABLET, FILM COATED ORAL at 07:04

## 2019-01-01 RX ADMIN — FAMOTIDINE 20 MG: 20 TABLET ORAL at 08:04

## 2019-01-01 RX ADMIN — INSULIN ASPART 2 UNITS: 100 INJECTION, SOLUTION INTRAVENOUS; SUBCUTANEOUS at 12:04

## 2019-01-01 RX ADMIN — INSULIN DETEMIR 10 UNITS: 100 INJECTION, SOLUTION SUBCUTANEOUS at 09:04

## 2019-01-01 RX ADMIN — LISINOPRIL 2.5 MG: 2.5 TABLET ORAL at 08:04

## 2019-01-01 RX ADMIN — CARVEDILOL 3.12 MG: 3.12 TABLET, FILM COATED ORAL at 09:04

## 2019-01-01 RX ADMIN — HEPARIN SODIUM AND DEXTROSE 23 UNITS/KG/HR: 10000; 5 INJECTION INTRAVENOUS at 05:04

## 2019-01-01 RX ADMIN — STANDARDIZED SENNA CONCENTRATE AND DOCUSATE SODIUM 1 TABLET: 8.6; 5 TABLET, FILM COATED ORAL at 08:04

## 2019-01-01 RX ADMIN — HEPARIN SODIUM AND DEXTROSE 23 UNITS/KG/HR: 10000; 5 INJECTION INTRAVENOUS at 06:04

## 2019-01-01 RX ADMIN — POLYETHYLENE GLYCOL 3350 17 G: 17 POWDER, FOR SOLUTION ORAL at 09:04

## 2019-01-01 RX ADMIN — Medication 500 MG: at 01:04

## 2019-01-01 RX ADMIN — INSULIN ASPART 2 UNITS: 100 INJECTION, SOLUTION INTRAVENOUS; SUBCUTANEOUS at 11:04

## 2019-01-01 RX ADMIN — MIDAZOLAM HYDROCHLORIDE 2 MG: 1 INJECTION, SOLUTION INTRAMUSCULAR; INTRAVENOUS at 04:04

## 2019-01-01 RX ADMIN — MORPHINE SULFATE 2 MG: 10 INJECTION INTRAVENOUS at 07:04

## 2019-01-01 RX ADMIN — CLOPIDOGREL BISULFATE 75 MG: 75 TABLET, FILM COATED ORAL at 08:04

## 2019-01-01 RX ADMIN — LIDOCAINE HYDROCHLORIDE 2 MG/MIN: 8 INJECTION, SOLUTION INTRAVENOUS at 03:04

## 2019-01-01 RX ADMIN — SUCCINYLCHOLINE CHLORIDE 20 MG: 20 INJECTION, SOLUTION INTRAMUSCULAR; INTRAVENOUS at 03:04

## 2019-01-01 RX ADMIN — FUROSEMIDE 80 MG: 10 INJECTION INTRAMUSCULAR; INTRAVENOUS at 01:04

## 2019-01-01 RX ADMIN — STANDARDIZED SENNA CONCENTRATE AND DOCUSATE SODIUM 1 TABLET: 8.6; 5 TABLET, FILM COATED ORAL at 04:04

## 2019-01-01 RX ADMIN — ASPIRIN 325 MG ORAL TABLET 325 MG: 325 PILL ORAL at 02:04

## 2019-01-01 RX ADMIN — ASPIRIN 81 MG CHEWABLE TABLET 81 MG: 81 TABLET CHEWABLE at 04:04

## 2019-01-01 RX ADMIN — Medication 1 MG: at 04:04

## 2019-01-01 RX ADMIN — CEFEPIME 1 G: 1 INJECTION, POWDER, FOR SOLUTION INTRAMUSCULAR; INTRAVENOUS at 12:04

## 2019-01-01 RX ADMIN — INSULIN ASPART 4 UNITS: 100 INJECTION, SOLUTION INTRAVENOUS; SUBCUTANEOUS at 08:04

## 2019-01-01 RX ADMIN — FAMOTIDINE 20 MG: 20 TABLET ORAL at 09:04

## 2019-01-01 RX ADMIN — ASPIRIN 81 MG CHEWABLE TABLET 81 MG: 81 TABLET CHEWABLE at 09:04

## 2019-01-01 RX ADMIN — AMIODARONE HYDROCHLORIDE 0.5 MG/MIN: 1.8 INJECTION, SOLUTION INTRAVENOUS at 05:04

## 2019-01-01 RX ADMIN — INSULIN ASPART 2 UNITS: 100 INJECTION, SOLUTION INTRAVENOUS; SUBCUTANEOUS at 01:04

## 2019-01-01 RX ADMIN — AMIODARONE HYDROCHLORIDE 1 MG/MIN: 1.8 INJECTION, SOLUTION INTRAVENOUS at 09:04

## 2019-01-01 RX ADMIN — INSULIN ASPART 1 UNITS: 100 INJECTION, SOLUTION INTRAVENOUS; SUBCUTANEOUS at 09:04

## 2019-01-01 RX ADMIN — INSULIN ASPART 2 UNITS: 100 INJECTION, SOLUTION INTRAVENOUS; SUBCUTANEOUS at 08:04

## 2019-01-01 RX ADMIN — INSULIN ASPART 1 UNITS: 100 INJECTION, SOLUTION INTRAVENOUS; SUBCUTANEOUS at 10:04

## 2019-01-01 RX ADMIN — LIDOCAINE HYDROCHLORIDE 2 MG/MIN: 8 INJECTION, SOLUTION INTRAVENOUS at 10:04

## 2019-01-01 RX ADMIN — FUROSEMIDE 10 MG/HR: 10 INJECTION, SOLUTION INTRAMUSCULAR; INTRAVENOUS at 05:04

## 2019-01-01 RX ADMIN — INSULIN ASPART 2 UNITS: 100 INJECTION, SOLUTION INTRAVENOUS; SUBCUTANEOUS at 05:04

## 2019-01-01 RX ADMIN — FUROSEMIDE 80 MG: 10 INJECTION, SOLUTION INTRAVENOUS at 08:04

## 2019-01-01 RX ADMIN — FUROSEMIDE 80 MG: 10 INJECTION, SOLUTION INTRAVENOUS at 03:04

## 2019-01-01 RX ADMIN — METOPROLOL TARTRATE 5 MG: 1 INJECTION, SOLUTION INTRAVENOUS at 03:04

## 2019-01-01 RX ADMIN — HEPARIN SODIUM AND DEXTROSE 14 UNITS/KG/HR: 10000; 5 INJECTION INTRAVENOUS at 12:04

## 2019-01-01 RX ADMIN — CLOPIDOGREL BISULFATE 75 MG: 75 TABLET, FILM COATED ORAL at 09:04

## 2019-01-01 RX ADMIN — STANDARDIZED SENNA CONCENTRATE AND DOCUSATE SODIUM 1 TABLET: 8.6; 5 TABLET, FILM COATED ORAL at 11:04

## 2019-01-01 RX ADMIN — FUROSEMIDE 80 MG: 10 INJECTION, SOLUTION INTRAMUSCULAR; INTRAVENOUS at 05:04

## 2019-01-01 RX ADMIN — INSULIN DETEMIR 10 UNITS: 100 INJECTION, SOLUTION SUBCUTANEOUS at 10:04

## 2019-01-01 RX ADMIN — LISINOPRIL 2.5 MG: 2.5 TABLET ORAL at 09:04

## 2019-01-01 RX ADMIN — AMIODARONE HYDROCHLORIDE 1 MG/MIN: 1.8 INJECTION, SOLUTION INTRAVENOUS at 04:04

## 2019-01-01 RX ADMIN — DIPHENHYDRAMINE HYDROCHLORIDE 50 MG: 50 CAPSULE ORAL at 03:04

## 2019-01-01 RX ADMIN — INSULIN DETEMIR 10 UNITS: 100 INJECTION, SOLUTION SUBCUTANEOUS at 11:04

## 2019-01-01 RX ADMIN — STANDARDIZED SENNA CONCENTRATE AND DOCUSATE SODIUM 1 TABLET: 8.6; 5 TABLET, FILM COATED ORAL at 09:04

## 2019-01-01 RX ADMIN — INSULIN ASPART 4 UNITS: 100 INJECTION, SOLUTION INTRAVENOUS; SUBCUTANEOUS at 06:04

## 2019-01-01 RX ADMIN — AMIODARONE HYDROCHLORIDE 0.5 MG/MIN: 1.8 INJECTION, SOLUTION INTRAVENOUS at 08:04

## 2019-01-01 RX ADMIN — IPRATROPIUM BROMIDE AND ALBUTEROL SULFATE 3 ML: 2.5; .5 SOLUTION RESPIRATORY (INHALATION) at 03:04

## 2019-01-01 RX ADMIN — AMIODARONE HYDROCHLORIDE 0.5 MG/MIN: 1.8 INJECTION, SOLUTION INTRAVENOUS at 09:04

## 2019-01-01 RX ADMIN — FUROSEMIDE 20 MG/HR: 10 INJECTION, SOLUTION INTRAMUSCULAR; INTRAVENOUS at 05:04

## 2019-01-01 RX ADMIN — AMIODARONE HYDROCHLORIDE 0.5 MG/MIN: 1.8 INJECTION, SOLUTION INTRAVENOUS at 12:04

## 2019-01-01 RX ADMIN — AZITHROMYCIN 500 MG: 250 TABLET, FILM COATED ORAL at 08:04

## 2019-01-01 RX ADMIN — PIPERACILLIN AND TAZOBACTAM 4.5 G: 4; .5 INJECTION, POWDER, LYOPHILIZED, FOR SOLUTION INTRAVENOUS; PARENTERAL at 03:04

## 2019-01-01 RX ADMIN — ATROPINE SULFATE 1 MG: 0.1 INJECTION PARENTERAL at 08:04

## 2019-01-01 RX ADMIN — LEVOFLOXACIN 750 MG: 750 TABLET, FILM COATED ORAL at 09:04

## 2019-01-01 RX ADMIN — FUROSEMIDE 40 MG: 10 INJECTION, SOLUTION INTRAVENOUS at 10:04

## 2019-01-01 RX ADMIN — CEFTRIAXONE SODIUM 1 G: 1 INJECTION, POWDER, FOR SOLUTION INTRAMUSCULAR; INTRAVENOUS at 09:04

## 2019-01-01 RX ADMIN — INSULIN ASPART 2 UNITS: 100 INJECTION, SOLUTION INTRAVENOUS; SUBCUTANEOUS at 04:04

## 2019-01-01 RX ADMIN — ACETAMINOPHEN 650 MG: 325 TABLET, FILM COATED ORAL at 10:04

## 2019-01-01 RX ADMIN — MORPHINE SULFATE 2 MG: 2 INJECTION, SOLUTION INTRAMUSCULAR; INTRAVENOUS at 01:04

## 2019-01-01 RX ADMIN — HEPARIN SODIUM AND DEXTROSE 12 UNITS/KG/HR: 10000; 5 INJECTION INTRAVENOUS at 01:04

## 2019-01-01 RX ADMIN — POTASSIUM CHLORIDE 20 MEQ: 14.9 INJECTION, SOLUTION INTRAVENOUS at 06:04

## 2019-01-01 RX ADMIN — AMIODARONE HYDROCHLORIDE 150 MG: 1.5 INJECTION, SOLUTION INTRAVENOUS at 03:04

## 2019-01-01 RX ADMIN — POLYETHYLENE GLYCOL 3350 17 G: 17 POWDER, FOR SOLUTION ORAL at 08:04

## 2019-01-01 RX ADMIN — ATORVASTATIN CALCIUM 40 MG: 40 TABLET, FILM COATED ORAL at 04:04

## 2019-01-01 RX ADMIN — DOBUTAMINE IN DEXTROSE 2.5 MCG/KG/MIN: 200 INJECTION, SOLUTION INTRAVENOUS at 09:04

## 2019-01-01 RX ADMIN — MAGNESIUM SULFATE IN WATER 2 G: 40 INJECTION, SOLUTION INTRAVENOUS at 10:04

## 2019-01-01 RX ADMIN — MAGNESIUM SULFATE HEPTAHYDRATE 2 G: 40 INJECTION, SOLUTION INTRAVENOUS at 11:04

## 2019-01-01 RX ADMIN — POTASSIUM CHLORIDE 40 MEQ: 400 INJECTION, SOLUTION INTRAVENOUS at 10:04

## 2019-01-01 RX ADMIN — AZITHROMYCIN 500 MG: 250 TABLET, FILM COATED ORAL at 09:04

## 2019-01-01 RX ADMIN — LIDOCAINE HYDROCHLORIDE 1 MG/MIN: 8 INJECTION, SOLUTION INTRAVENOUS at 12:04

## 2019-01-01 RX ADMIN — NOREPINEPHRINE BITARTRATE 0.2 MCG/KG/MIN: 1 INJECTION, SOLUTION, CONCENTRATE INTRAVENOUS at 05:04

## 2019-01-01 RX ADMIN — INSULIN ASPART 8 UNITS: 100 INJECTION, SOLUTION INTRAVENOUS; SUBCUTANEOUS at 11:04

## 2019-01-01 RX ADMIN — AMIODARONE HYDROCHLORIDE 150 MG: 1.5 INJECTION, SOLUTION INTRAVENOUS at 04:04

## 2019-01-01 RX ADMIN — HEPARIN SODIUM AND DEXTROSE 20 UNITS/KG/HR: 10000; 5 INJECTION INTRAVENOUS at 05:04

## 2019-01-01 RX ADMIN — ALTEPLASE 2 MG: 2.2 INJECTION, POWDER, LYOPHILIZED, FOR SOLUTION INTRAVENOUS at 03:04

## 2019-01-01 RX ADMIN — FUROSEMIDE 40 MG: 10 INJECTION, SOLUTION INTRAMUSCULAR; INTRAVENOUS at 06:04

## 2019-01-01 RX ADMIN — LIDOCAINE HYDROCHLORIDE 3 MG/MIN: 8 INJECTION, SOLUTION INTRAVENOUS at 05:04

## 2019-01-01 RX ADMIN — ENOXAPARIN SODIUM 110 MG: 150 INJECTION SUBCUTANEOUS at 03:04

## 2019-01-01 RX ADMIN — AMIODARONE HYDROCHLORIDE 0.5 MG/MIN: 1.8 INJECTION, SOLUTION INTRAVENOUS at 02:04

## 2019-01-01 RX ADMIN — FUROSEMIDE 80 MG: 10 INJECTION, SOLUTION INTRAVENOUS at 06:04

## 2019-01-01 RX ADMIN — FUROSEMIDE 80 MG: 10 INJECTION, SOLUTION INTRAVENOUS at 05:04

## 2019-01-01 RX ADMIN — FUROSEMIDE 40 MG: 10 INJECTION, SOLUTION INTRAVENOUS at 06:04

## 2019-01-01 RX ADMIN — ALPRAZOLAM 1 MG: 0.5 TABLET ORAL at 12:04

## 2019-01-01 RX ADMIN — VANCOMYCIN HYDROCHLORIDE 1750 MG: 500 INJECTION, POWDER, LYOPHILIZED, FOR SOLUTION INTRAVENOUS at 01:04

## 2019-01-01 RX ADMIN — CLOPIDOGREL BISULFATE 300 MG: 300 TABLET, FILM COATED ORAL at 03:04

## 2019-01-01 RX ADMIN — INSULIN ASPART 4 UNITS: 100 INJECTION, SOLUTION INTRAVENOUS; SUBCUTANEOUS at 07:04

## 2019-01-01 RX ADMIN — AMIODARONE HYDROCHLORIDE 0.5 MG/MIN: 1.8 INJECTION, SOLUTION INTRAVENOUS at 10:04

## 2019-01-01 RX ADMIN — FUROSEMIDE 80 MG: 10 INJECTION, SOLUTION INTRAMUSCULAR; INTRAVENOUS at 01:04

## 2019-01-01 RX ADMIN — LIDOCAINE HYDROCHLORIDE 1 MG/MIN: 8 INJECTION, SOLUTION INTRAVENOUS at 01:04

## 2019-01-01 RX ADMIN — ACETAMINOPHEN 650 MG: 325 TABLET, FILM COATED ORAL at 11:04

## 2019-01-01 RX ADMIN — PIPERACILLIN AND TAZOBACTAM 4.5 G: 4; .5 INJECTION, POWDER, LYOPHILIZED, FOR SOLUTION INTRAVENOUS; PARENTERAL at 01:04

## 2019-01-01 RX ADMIN — FAMOTIDINE 20 MG: 20 TABLET ORAL at 04:04

## 2019-01-01 RX ADMIN — NOREPINEPHRINE BITARTRATE 0.12 MCG/KG/MIN: 1 INJECTION, SOLUTION, CONCENTRATE INTRAVENOUS at 11:04

## 2019-04-04 PROBLEM — R80.9 MICROALBUMINURIA: Status: RESOLVED | Noted: 2017-02-13 | Resolved: 2019-01-01

## 2019-04-04 PROBLEM — R79.89 ABNORMAL LIVER FUNCTION TESTS: Status: RESOLVED | Noted: 2017-02-13 | Resolved: 2019-01-01

## 2019-04-04 NOTE — PROGRESS NOTES
[facility]  Progress Note    SUBJECTIVE:     Chief Complaint   Patient presents with    Shortness of Breath    Wheezing       HPI   Kristopher Kevin  is a 53 y.o. male here to establish care and for follow-up of his chronic conditions. Has DM, but hasn't seen any provider or taken any meds past couple years. Doesn't check his sugars. Admits bad diet, and no exercise. Feet are numb, which seems to be worsening. Also has c/o shortness of breath, specifically on exertion. This has been going on for months. No current chest pain.     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    Microalbuminuria 2/13/2017    Nodular prostate without urinary obstruction 8/14/2012    Tobacco use disorder 8/14/2012     Past Surgical History:   Procedure Laterality Date    HERNIA REPAIR       Family History   Problem Relation Age of Onset    Diabetes Mother     Cancer Neg Hx     Heart disease Neg Hx     Hypertension Neg Hx     Stroke Neg Hx      Social History     Tobacco Use    Smoking status: Current Every Day Smoker     Packs/day: 1.50     Types: Cigarettes    Smokeless tobacco: Never Used   Substance Use Topics    Alcohol use: Yes    Drug use: Never        Review of Systems   Constitutional: Negative for chills, diaphoresis, fever and malaise/fatigue.   HENT: Negative.  Negative for congestion, ear pain and sore throat.    Eyes: Negative for photophobia and discharge.   Respiratory: Positive for shortness of breath and wheezing. Negative for cough, hemoptysis and sputum production.    Cardiovascular: Negative.  Negative for chest pain and palpitations.   Gastrointestinal: Negative.  Negative for  abdominal pain, constipation, diarrhea, nausea and vomiting.   Genitourinary: Positive for frequency. Negative for dysuria, flank pain, hematuria and urgency.   Musculoskeletal: Negative for back pain, myalgias and neck pain.   Skin: Negative for itching and rash.   Neurological: Positive for tingling (both feet). Negative for dizziness, sensory change, focal weakness, weakness and headaches.   All other systems reviewed and are negative.    OBJECTIVE:     Vitals:    04/04/19 1438   BP: 132/84   Pulse: 108   Resp: 20   Temp: 99.8 °F (37.7 °C)     Body mass index is 34.04 kg/m².    Physical Exam   Constitutional: He is oriented to person, place, and time and well-developed, well-nourished, and in no distress. No distress.   HENT:   Head: Normocephalic and atraumatic.   Right Ear: External ear normal. Tympanic membrane is not perforated, not erythematous, not retracted and not bulging. No hemotympanum.   Left Ear: External ear normal. Tympanic membrane is not perforated, not erythematous, not retracted and not bulging. No hemotympanum.   Nose: Nose normal.   Mouth/Throat: Oropharynx is clear and moist. No oropharyngeal exudate.   Eyes: Conjunctivae and EOM are normal.   Cardiovascular: Normal rate, regular rhythm and normal heart sounds. Exam reveals no gallop and no friction rub.   No murmur heard.  Pulmonary/Chest: Effort normal and breath sounds normal. No respiratory distress. He has no wheezes. He has no rales. He exhibits no tenderness.   Abdominal: Soft. Bowel sounds are normal. He exhibits no distension. There is no tenderness. There is no rebound.   Neurological: He is alert and oriented to person, place, and time.   Skin: Skin is warm. He is not diaphoretic.     Protective Sensation (w/ 10 gram monofilament):  Right: Decreased  Left: Decreased    Visual Inspection:  Normal -  Bilateral    Pedal Pulses:   Right: Present  Left: Present    Posterior tibialis:   Right:Present  Left: Present      Old records were  reviewed. Labs and/or images were independently reviewed.    ASSESSMENT     1. SOB (shortness of breath)    2. Uncontrolled type 2 diabetes mellitus with hyperglycemia    3. Encounter for lipid screening for cardiovascular disease    4. Tobacco abuse    5. Type 2 diabetes, uncontrolled, with neuropathy        PLAN:     SOB (shortness of breath), on exertion.  -     Brain natriuretic peptide; Future; Expected date: 04/04/2019  -     X-Ray Chest PA And Lateral; Future; Expected date: 04/04/2019  -     albuterol-ipratropium 2.5 mg-0.5 mg/3 mL nebulizer solution 3 mL  -     Likely due to undiagnosed COPD, from smoking 1-1.5 PPD. Will refrain from using steroids at this time due to uncontrolled diabetes. Will need ECHO if BNP elevated.     Uncontrolled type 2 diabetes mellitus with hyperglycemia  -     Hemoglobin A1c; Future; Expected date: 04/04/2019  -     CBC auto differential; Future  -     Comprehensive metabolic panel; Future  -     TSH; Future  -     irbesartan (AVAPRO) 75 MG tablet; Take 1 tablet (75 mg total) by mouth once daily.  Dispense: 90 tablet; Refill: 3  -     blood-glucose meter kit; Use as instructed  Dispense: 1 each; Refill: 0  -     blood sugar diagnostic Strp; 1 strip by Misc.(Non-Drug; Combo Route) route 3 (three) times daily.  Dispense: 100 strip; Refill: 5  -     lancets Misc; 1 application by Misc.(Non-Drug; Combo Route) route 3 (three) times daily.  Dispense: 100 each; Refill: 5  -     Diabetic Eye Screening Photo; Future  -     Microalbumin/creatinine urine ratio; Future; Expected date: 04/04/2019  -     Instructed patient to take daily glucose AM logs and to write them down to bring with on next visit. Advised patient to decrease intake of carbohydrates/simple sugars.         Encounter for lipid screening for cardiovascular disease  -     Lipid panel; Future; Expected date: 04/04/2019    Tobacco abuse  -     Ambulatory referral to Smoking Cessation Program  -     Counseled patient about  importance of smoking cessation. Patient ready to quit. Will start smoking cessation treatment options.    Type 2 diabetes, uncontrolled, with neuropathy  -     glimepiride (AMARYL) 4 MG tablet; Take 1 tablet (4 mg total) by mouth daily with breakfast.  Dispense: 90 tablet; Refill: 0  -     Instructed patient to take daily glucose AM logs and to write them down to bring with on next visit. Advised patient to decrease intake of carbohydrates/simple sugars.          RTC PRN, or 2 weeks for diabetes management.    Jhoan Lundy MD  04/04/2019 3:01 PM

## 2019-04-04 NOTE — LETTER
April 4, 2019      [facility]  [address] [facility]  [place] [address]  Phone: [phone]  Fax: [phone]       Patient: Kristopher Kevin   YOB: 1965  Date of Visit: 04/04/2019    To Whom It May Concern:    Michael Kevin  was at [facility] on 04/04/2019. Please excuse 4/1, 4/2, 4/3. He may return to work/school on 04/04/2019   with no restrictions. If you have any questions or concerns, or if I can be of further assistance, please do not hesitate to contact me.    Sincerely,        Jhoan Lundy MD

## 2019-04-04 NOTE — PROGRESS NOTES
Influenza Vaccine: Pt declined  Lipid Panel: Orders entered  Hemoglobin A1c: Orders entered  Colonoscopy: LETY Signed  Pneumonia vaccine: Pt declined  Eye Exam: Referral

## 2019-04-05 NOTE — PROGRESS NOTES
HPI     52 Y/o here for screening for diabetic retinopathy with non-dilated   fundus photos per Dr. Lundy     Last edited by Sam Heath MA on 4/5/2019  4:03 PM. (History)            Assessment /Plan     For exam results, see Encounter Report.    There are no diagnoses linked to this encounter.    Please see Dr. Aguero progress note for interpretation

## 2019-04-08 PROBLEM — J96.01 ACUTE HYPOXEMIC RESPIRATORY FAILURE: Status: ACTIVE | Noted: 2019-01-01

## 2019-04-08 PROBLEM — J96.02 ACUTE RESPIRATORY FAILURE WITH HYPOXIA AND HYPERCAPNIA: Status: ACTIVE | Noted: 2019-01-01

## 2019-04-08 PROBLEM — I21.4 NSTEMI (NON-ST ELEVATED MYOCARDIAL INFARCTION): Status: ACTIVE | Noted: 2019-01-01

## 2019-04-08 PROBLEM — I16.1 HYPERTENSIVE EMERGENCY: Status: ACTIVE | Noted: 2019-01-01

## 2019-04-08 PROBLEM — I21.3 STEMI (ST ELEVATION MYOCARDIAL INFARCTION): Status: ACTIVE | Noted: 2019-01-01

## 2019-04-08 PROBLEM — I50.9 ACUTE CONGESTIVE HEART FAILURE: Status: ACTIVE | Noted: 2019-01-01

## 2019-04-08 PROBLEM — J81.0 ACUTE PULMONARY EDEMA: Status: ACTIVE | Noted: 2019-01-01

## 2019-04-08 NOTE — ED PROVIDER NOTES
Encounter Date: 4/8/2019       History     Chief Complaint   Patient presents with    Shortness of Breath     SOB that woke up him from his sleep; pt's O2 RA Saturation was 81% and he was tachypneic so he was roomed immediately before obtaining other vital signs     This is an emergent evaluation of a 53-year-old male who presents via family transportation with acute severe shortness of breath.  Patient has been having mild shortness of breath and a nonproductive cough over the last 2 weeks.  He felt okay when he went to bed tonight but developed acute severe shortness of breath that woke him from sleep around an hour PTA.      Patient just went to a new PMD on Thursday 4/4/19.  New PMD started him on Glimepiride for DM2 and Irbesartan for BP.  PMD did outpatient labs that day and CXR the following day (Friday 4/5/19).  PMD called in patient lasix (Sunday 4/7/19) because CXR showed pulmonary edema and BNP was elevated, but patient has not yet picked this up.    Patient formerly saw Dr. Hyde but now sees Dr. Kush Lundy.      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    Microalbuminuria 2/13/2017    Nodular prostate without urinary obstruction 8/14/2012    Tobacco use disorder 8/14/2012     Past Surgical History:   Procedure Laterality Date    HERNIA REPAIR       Family History   Problem Relation Age of Onset    Diabetes Mother     Cancer Neg Hx     Heart disease Neg Hx     Hypertension Neg Hx     Stroke Neg Hx      Social History     Tobacco Use    Smoking status: Current Every Day Smoker     Packs/day: 1.50     Types: Cigarettes     Smokeless tobacco: Never Used   Substance Use Topics    Alcohol use: Yes    Drug use: Never     Review of Systems   Constitutional: Positive for diaphoresis. Negative for fever.   HENT: Negative for sore throat.    Eyes: Negative for visual disturbance.   Respiratory: Positive for shortness of breath.    Cardiovascular: Positive for leg swelling. Negative for chest pain.   Gastrointestinal: Negative for vomiting.   Genitourinary: Negative for dysuria.   Musculoskeletal: Negative for neck pain.   Skin: Negative for rash.   Neurological: Negative for weakness and headaches.       Physical Exam     Initial Vitals   BP Pulse Resp Temp SpO2   04/08/19 0244 04/08/19 0244 04/08/19 0238 -- 04/08/19 0238   (!) 215/112 (!) 119 (!) 32  (!) 81 %      MAP       --                Physical Exam    Nursing note and vitals reviewed.  Constitutional: He appears well-developed and well-nourished. He is diaphoretic. He appears ill. He appears distressed.   Awake and alert, uncomfortable middle-aged male. Diaphoretic. Almost unable to speak due to SOB.   HENT:   Head: Normocephalic and atraumatic.   Eyes: Conjunctivae and EOM are normal. Pupils are equal, round, and reactive to light.   Neck: Normal range of motion. Neck supple.   Cardiovascular: Normal rate, regular rhythm and intact distal pulses.   Murmur heard.  Pulmonary/Chest: He is in respiratory distress. He has no wheezes. He has no rhonchi. He has rales.   Abdominal: Soft. There is no tenderness. There is no rebound and no guarding.   Musculoskeletal: Normal range of motion. He exhibits edema (2+ bilat LE). He exhibits no tenderness.   Neurological: He is alert and oriented to person, place, and time. He has normal strength.   Moving all extremities   Skin: Skin is warm.   Psychiatric: He has a normal mood and affect.         ED Course   ED US Echo  Date/Time: 4/8/2019 3:25 AM  Performed by: Genevieve Valdez MD  Authorized by: Genevieve Valdez MD   Comments: Bedside  echo: No significant pericardial effusion. EF appears very low.     Critical Care  Date/Time: 4/8/2019 3:27 AM  Performed by: Genevieve Valdez MD  Authorized by: Genevieve Valdez MD   Direct patient critical care time: 10 minutes  Additional history critical care time: 10 minutes  Ordering / reviewing critical care time: 10 minutes  Documentation critical care time: 10 minutes  Consulting other physicians critical care time: 10 minutes  Total critical care time (exclusive of procedural time) : 50 minutes        Labs Reviewed   CBC W/ AUTO DIFFERENTIAL - Abnormal; Notable for the following components:       Result Value    WBC 13.40 (*)     Platelets 448 (*)     Gran # (ANC) 9.3 (*)     Mono # 1.2 (*)     All other components within normal limits   COMPREHENSIVE METABOLIC PANEL - Abnormal; Notable for the following components:    CO2 21 (*)     Glucose 158 (*)     Albumin 3.3 (*)     Total Bilirubin 1.1 (*)     AST 57 (*)     ALT 59 (*)     All other components within normal limits   TROPONIN I - Abnormal; Notable for the following components:    Troponin I 6.610 (*)     All other components within normal limits   B-TYPE NATRIURETIC PEPTIDE - Abnormal; Notable for the following components:     (*)     All other components within normal limits   ISTAT PROCEDURE - Abnormal; Notable for the following components:    POC PH 7.265 (*)     POC PCO2 52.1 (*)     POC HCO3 23.6 (*)     All other components within normal limits   PROTIME-INR   APTT   BETA - HYDROXYBUTYRATE, SERUM   OSMOLALITY, SERUM   URINALYSIS, REFLEX TO URINE CULTURE   TYPE & SCREEN     EKG Readings: (Independently Interpreted)   02:42: Sinus tach, . Normal axis. Q waves in V2-V6. ST elevation in V2-V6.   02:43: Sinus tach, . Normal axis. PVCs. Q waves in V2-V6. ST elevation in V2-V6.   03:32: Afib with RVR, . Normal axis. Q waves in V2-V6. ST elevation in V2-V6.        Imaging Results          X-Ray Chest AP Portable (Final  result)  Result time 04/08/19 03:28:03    Final result by Sky Epstein MD (04/08/19 03:28:03)                 Impression:      Interval worsening of patchy bilateral mixed interstitial and alveolar opacities compared to prior examination.  Findings may relate to edema, ARDS, or multifocal infection.  Clinical correlation is advised.      Electronically signed by: Sky Epstein MD  Date:    04/08/2019  Time:    03:28             Narrative:    EXAMINATION:  XR CHEST AP PORTABLE    CLINICAL HISTORY:  Chest Pain;    TECHNIQUE:  Single frontal view of the chest was performed.    COMPARISON:  04/05/2019    FINDINGS:  Cardiac monitoring leads overlie the chest.  The cardiomediastinal silhouette appears stable from prior examination.  There are bilateral patchy mixed interstitial and alveolar opacities in a somewhat perihilar distribution.  This appears increased in extent compared to prior study of 04/05/2019.  No large pleural effusion or pneumothorax appreciated.  The visualized osseous structures appear intact.                              X-Rays:   Independently Interpreted Readings:   Other Readings:  CXR significant pulmonary edema, mild cardiomegaly    Medical Decision Making:   History:   Old Medical Records: I decided to obtain old medical records.  Old Records Summarized: records from previous admission(s) and records from clinic visits.  Initial Assessment:   53-year-old male with severe shortness of breath.  Patient has had shortness of breath and lower extremity edema for about 2 weeks.  No chest pain.  Differential Diagnosis:   Ddx includes hypertensive emergency, CHF, ACS, PNA, sepsis, PE, anemia, other.  Independently Interpreted Test(s):   I have ordered and independently interpreted X-rays - see prior notes.  I have ordered and independently interpreted EKG Reading(s) - see prior notes  Clinical Tests:   Lab Tests: Ordered and Reviewed  Radiological Study: Ordered and Reviewed  Medical Tests:  Ordered and Reviewed  ED Management:  Patient arrived hypertensive, tachycardic, hypoxic to 79%.  We started him on BIPAP near-immediately on his being roomed in the ED.    EKG shows diffuse ST elevations but with Q waves and no reciprocal changes.  I discussed this with Dr. Jacobo, cardiology on call, who felt changes were likely not acute.  We have no prior EKGs.     CXR shows significant pulmonary edema.      Bedside echo shows low EF.    ABG pH 7.265. PaCO2 52. PaO2 99 on BIPAP.  Labs: WBC 13.4. BUN 19, creatinine 1.0. Troponin 6.6. .    I ordered aspirin 325mg PO, plavix 300mg PO, lovenox 1mg/kg SC, nitropaste 2in, vasotec 1.25mg IV, metoprolol 5mg IV.  Patient BP did improve mildly but he then went into afib with RVR.  He then had amiodarone 150mg IV which converted him back to sinus rhythm.    Patient began looking much more comfortable around 4AM.  He is no longer actively sweating and is much less tachypneic.  He states he feels more able to breathe (he is still on BIPAP).  Patient never had chest pain.      Initial /112, , ; current /58, MAP 74, HR 93. No additional BP meds ordered at present as patient is well past goal MAP.     I discussed this patient with nocturnist Dr. Louis for admit to ICU.  Patient requires close monitoring of his BP and respiratory status, BIPAP support, telemetry.  I have ordered repeat IV lasix and SC tx dose lovenox for later today.  I have ordered cardiology consult.  I have ordered TTE for this AM.  I have ordered sliding scale insulin.  Patient is NPO pending cardiology eval (?cath).      I have updated patient and family as to diagnosis and plan of care.      Genevieve Valdez  Other:   I have discussed this case with another health care provider.                      Clinical Impression:       ICD-10-CM ICD-9-CM   1. Hypertensive emergency I16.1 401.9   2. Abnormal EKG R94.31 794.31   3. Acute pulmonary edema J81.0 518.4   4. Bilateral lower  extremity edema R60.0 782.3   5. Shortness of breath R06.02 786.05   6. SOB (shortness of breath) R06.02 786.05   7. Elevated troponin R74.8 790.6               Genevieve Valdez MD  04/08/19 0455

## 2019-04-08 NOTE — Clinical Note
proximal   left anterior descending. Angiography performed post intervention of the left coronary arteries in multiple views.

## 2019-04-08 NOTE — EICU
[facility]  Critical Care - Medicine  History & Physical    Patient Name: Kristopher Kevin  MRN: [id number]  Admission Date: 4/8/2019  Hospital Length of Stay: 0 days  Code Status: Full Code  Attending Physician: Estefanía Laird MD   Primary Care Provider: Jhoan Lundy MD   Principal Problem: <principal problem not specified>    Subjective:     HPI:54 yo male with hx DM , elevated LFTs,  Comes in with SOB and LE edema x 2weeks.      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    Microalbuminuria 2/13/2017    Nodular prostate without urinary obstruction 8/14/2012    Tobacco use disorder 8/14/2012       Past Surgical History:   Procedure Laterality Date    HERNIA REPAIR         Review of patient's allergies indicates:  No Known Allergies    Family History     Problem Relation (Age of Onset)    Diabetes Mother        Tobacco Use    Smoking status: Current Every Day Smoker     Packs/day: 1.50     Types: Cigarettes    Smokeless tobacco: Never Used   Substance and Sexual Activity    Alcohol use: Yes    Drug use: Never    Sexual activity: Not Currently      Review of Systems  Objective:     Vital Signs (Most Recent):  Temp: 98.2 °F (36.8 °C) (04/08/19 0519)  Pulse: 101 (04/08/19 0530)  Resp: (!) 31 (04/08/19 0530)  BP: 124/68 (04/08/19  0530)  SpO2: 100 % (04/08/19 0502) Vital Signs (24h Range):  Temp:  [98.2 °F (36.8 °C)-99.1 °F (37.3 °C)] 98.2 °F (36.8 °C)  Pulse:  [] 101  Resp:  [31-44] 31  SpO2:  [79 %-100 %] 100 %  BP: (102-215)/() 124/68     Weight: 109.5 kg (241 lb 6.5 oz)  Body mass index is 33.67 kg/m².      Intake/Output Summary (Last 24 hours) at 4/8/2019 0544  Last data filed at 4/8/2019 0351  Gross per 24 hour   Intake 100 ml   Output --   Net 100 ml       Physical Exam    Vents:       Lines/Drains/Airways     Peripheral Intravenous Line                 Peripheral IV - Single Lumen 04/08/19 0251 Left Hand less than 1 day         Peripheral IV - Single Lumen 04/08/19 0251 Right Antecubital less than 1 day                Significant Labs:    CBC/Anemia Profile:  Recent Labs   Lab 04/08/19  0245   WBC 13.40*   HGB 14.3   HCT 42.8   *   MCV 87   RDW 12.6        Chemistries:  Recent Labs   Lab 04/08/19  0245      K 4.0      CO2 21*   BUN 19   CREATININE 1.0   CALCIUM 10.0   ALBUMIN 3.3*   PROT 8.1   BILITOT 1.1*   ALKPHOS 89   ALT 59*   AST 57*       All pertinent labs within the past 24 hours have been reviewed.    Significant Imaging: I have reviewed and interpreted all pertinent imaging results/findings within the past 24 hours.    Assessment/Plan:     Active Diagnoses:    Diagnosis Date Noted POA    Hypertensive emergency [I16.1] 04/08/2019 Yes      Problems Resolved During this Admission:     Anterior STEMI by ECG and troponin,  I talked to RN, she told me Hospitalist just arrived and I requested that he/she see the pt immediately and talk to Cardiology.  Pulmonary edema    As already ordered (ASA, Plavix. enoxaparin,)  Diuresis   Hold antiHTN as BP now dropping  Also orderd amiodarone ( out of afib, now NSR)  Possible catheterization      Critical Care Daily Checklist:    A: Awake: RASS Goal/Actual Goal:  0  Actual:  0   B:      C:                        D:      E:     F: Feeding    AS:  Analgesia/Sedation As needed   T: Thromboembolic Prophylaxis yes   H: HOB > 300 {YES,     U: Stress Ulcer Prophylaxis (if needed) yes   G: Glucose Control yes   B: Bowel Function    I: Indwelling Catheter (Lines & Coleman) Necessity As needed   D: De-escalation of Antimicrobials/Pharmacotherapies     Plan for the day/ETD Cardiology f/u ASAP    Code Status:  Family/Goals of Care: Full Code       Critical Care Time: 15 minutes  Critical secondary to Patient has a condition that poses threat to life and bodily function: Acute Myocardial Infarction     Critical care was time spent personally by me on the following activities: development of treatment plan with patient or surrogate and bedside caregivers, discussions with consultants, evaluation of patient's response to treatment, examination of patient, ordering and performing treatments and interventions, ordering and review of laboratory studies, ordering and review of radiographic studies, pulse oximetry, re-evaluation of patient's condition.  This critical care time did not overlap with that of any other provider or involve time for any procedures.     Jeremiah Dhillon MD  Critical Care - Medicine  [facility]

## 2019-04-08 NOTE — TELEPHONE ENCOUNTER
It is currently in hospital at this time. Pts wife has been informed that he will be revaluated at discharged during hospital follow up.

## 2019-04-08 NOTE — ASSESSMENT & PLAN NOTE
ST segment changes with possible hypertensive cardiomyopathy versus late presenting MI  Follow troponin trend but currently chest pain-free  Plan for bilateral heart catheterization when stable, possibly today if further deterioration or significant upward trend of cardiac marker  Loaded on anti-platelet therapy  Received 1 dose of Lovenox which will now be held pending possible procedure

## 2019-04-08 NOTE — ASSESSMENT & PLAN NOTE
--cardiogenic pulmonary edema certainly a contributor given HTN emergency, NSTEMI, acute on chronic HFrEF presentation. Likely component of superimposed infectious etiology, CAP  --agree with BP control, diuresis.   --recommend adding azithromycin, zosyn, and vancomycin empirically for now. Respiratory viral panel and sputum culture pending.   --continue Bipap. Have adjusted settings to 15/10, 30% and tolerating well. Would continue through day, likely to go for LHC in afternoon. Possibly wean from Bipap to NC following LHC if has diuresed well and hemodynamics improved.

## 2019-04-08 NOTE — H&P
"[facility] Medicine  History & Physical    Patient Name: Kristopher Kevin  MRN: [id number]  Admission Date: 4/8/2019  Attending Physician: Estefanía Laird MD   Primary Care Provider: Jhoan Lundy MD         Patient information was obtained from patient, past medical records and ER records.     Subjective:     Principal Problem:NSTEMI (non-ST elevated myocardial infarction)    Chief Complaint:   Chief Complaint   Patient presents with    Shortness of Breath     SOB that woke up him from his sleep; pt's O2 RA Saturation was 81% and he was tachypneic so he was roomed immediately before obtaining other vital signs        HPI: 53-year-old male with HLP, diabetes type 2, abnormal liver function tests, irregular heartbeat, tobacco abuse (quit 3 days ago) who presented with worsening shortness of breath x 1 week.  He reports shortness breath started 1 week ago when he "fell ill" with some sort of viral syndrome with associated nausea and vomiting, shortness of breath and chest pain which he attributed to throwing up that self-resolved.  He went to establish care with a new primary care physician and was seen on 04/04/2019 and workup with a chest x-ray was done on 04/05 consistent with pulmonary edema and Lasix was called in for the patient but this medication was never picked up.  He reports shortness of breath gradually continued to get worse, and then finally last night he could no longer lay flat given that he felt like he was being smothered with any attempt.  He reported swelling in his lower extremities; dry cough.  No fevers or chills, no recent travels, no sick contacts.  No reported chest pain since the initial episode that self-resolved 1 week ago.  In the ER, he was in respiratory distress and was placed on BiPAP.  His BNP was 576, initial troponin of 6.610, LFTs with total bili 1.1 and AST/ALT of 57/59, WBC count of 13.40, ABG with pH of 7.265 pCO2 52.1 PO2 of 99 and bicarb of 23.6 on " BiPAP, chest x-ray with interval worsening of patchy bilateral mixed interstitial and alveolar opacities compared to prior examination.  Findings may relate to edema, ARDS, or multifocal infection.  EKG reviewed with multiple leads with questionable ST elevation.    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      Current Outpatient Medications on File Prior to Encounter   Medication Sig    glimepiride (AMARYL) 4 MG tablet Take 1 tablet (4 mg total) by mouth daily with breakfast.    irbesartan (AVAPRO) 75 MG tablet Take 1 tablet (75 mg total) by mouth once daily.    blood sugar diagnostic Strp 1 strip by Misc.(Non-Drug; Combo Route) route 3 (three) times daily.    blood-glucose meter kit Use as instructed    furosemide (LASIX) 20 MG tablet Take 1 tablet (20 mg total) by mouth daily as needed (shortness of breath).    lancets Misc 1 application by Misc.(Non-Drug; Combo Route) route 3 (three) times daily.     Family History     Problem Relation (Age of Onset)    Diabetes Mother        Tobacco Use    Smoking status: Current Every Day Smoker     Packs/day: 1.50     Types: Cigarettes    Smokeless tobacco: Never Used    Tobacco comment: Quick 3 days ago   Substance and Sexual Activity    Alcohol use: Yes    Drug use: Never    Sexual activity: Not Currently     Review of Systems   Constitutional: Positive for appetite  change. Negative for chills and fever.   HENT: Negative for sore throat.    Eyes: Negative for visual disturbance.   Respiratory: Positive for cough and shortness of breath.    Cardiovascular: Positive for leg swelling. Negative for chest pain.   Gastrointestinal: Positive for nausea.   Endocrine: Negative for polyuria.   Genitourinary: Negative for dysuria.   Musculoskeletal: Negative for back pain.   Skin: Negative for rash.   Neurological: Positive for weakness. Negative for syncope and speech difficulty.   Psychiatric/Behavioral: Negative for confusion.     Objective:     Vital Signs (Most Recent):  Temp: 98.2 °F (36.8 °C) (04/08/19 0519)  Pulse: 93 (04/08/19 0700)  Resp: (!) 30 (04/08/19 0700)  BP: 116/78 (04/08/19 0700)  SpO2: 98 % (04/08/19 0700) Vital Signs (24h Range):  Temp:  [98.2 °F (36.8 °C)-99.1 °F (37.3 °C)] 98.2 °F (36.8 °C)  Pulse:  [] 93  Resp:  [28-44] 30  SpO2:  [79 %-100 %] 98 %  BP: (102-215)/() 116/78     Weight: 109.5 kg (241 lb 6.5 oz)  Body mass index is 33.67 kg/m².    Physical Exam   Constitutional: He is oriented to person, place, and time. He appears well-developed. He appears distressed.   HENT:   Head: Normocephalic and atraumatic.   Eyes: Pupils are equal, round, and reactive to light.   Neck: Normal range of motion. Neck supple.   Cardiovascular: Normal rate and regular rhythm.   Pulmonary/Chest:   Coarse breath sound with crackles and decreased at bases, slight increased work of breathing   Abdominal: Bowel sounds are normal. He exhibits no distension and no mass. There is no tenderness. There is no rebound and no guarding.   Musculoskeletal: Normal range of motion. He exhibits edema.   Trace edema to lower extremities   Neurological: He is alert and oriented to person, place, and time.   Skin: Skin is warm and dry. Capillary refill takes less than 2 seconds.   Psychiatric: He has a normal mood and affect. His behavior is normal. Thought content normal.         CRANIAL  NERVES     CN III, IV, VI   Pupils are equal, round, and reactive to light.       Significant Labs: All pertinent labs within the past 24 hours have been reviewed.    Significant Imaging: I have reviewed and interpreted all pertinent imaging results/findings within the past 24 hours.    Assessment/Plan:     * NSTEMI (non-ST elevated myocardial infarction)  Patient with chest pain for 1 brief episode 1 week ago followed by progressive shortness of breath  Patient definitely has a NSTEMI with a troponin of 6.610  EKG concerning for possible STEMI but this was reviewed by Cardiology  Patient treated with full-dose Lovenox for anticoagulation  He has been loaded with aspirin and Plavix, statin not given given abnormal liver function tests  Blood pressure in the normal range currently  Echo pending  Will continue trend markers and monitor closely in the ICU  Cardiology consulted    Acute hypoxemic respiratory failure  Secondary to pulmonary edema with likely new onset CHF  Chest x-ray on 4/4 with edema as well  Unlikely to be pneumonia, but concerning for possible progression to ARDS  Continue aggressive diuresis with IV Lasix Q 12  Monitor daily weights and strict I/Os  Echo pending  Will check a procalcitonin level  Pulmonary is been consulted    Hypertensive emergency  Patient does not have history of hypertension  Presented with blood pressure of 215/112 with a pulse of 119  Patient was given IV antihypertensive medications with response in blood pressure  Nitroglycerin drip was ordered but never initiated  Blood pressure normal range at 110-120 systolic  Likely induced by volume overload state with good response with diuresis    Acute congestive heart failure  Patient with likely new onset CHF given presentation and high BNP  Echo was pending  Diuresis and progress with IV Lasix    Acute pulmonary edema  As discussed above    Abnormal LFTs  Patient with abnormal liver function tests ongoing for some time  Will check  hepatitis panel and continue monitor with repeat labs    Hyperlipidemia  Will check a lipid panel  Patient with abnormal liver function status which may preclude statin therapy    Diabetes mellitus type II, uncontrolled  Hold outpatient regimen for now  Monitor with Accu-Cheks and give sliding scale insulin as necessary  Glucose goal of 140-180 during hospitalization  Will check a hemoglobin A1c    Tobacco use disorder  Patient quit smoking 3 days ago  Smoking cessation counseling done and continued encouragement given for > 3 min      VTE Risk Mitigation (From admission, onward)        Ordered     IP VTE HIGH RISK PATIENT  Once      04/08/19 0519     Place NANCY hose  Until discontinued      04/08/19 0519     Place sequential compression device  Until discontinued      04/08/19 0519        Critical care time spent on the evaluation and treatment of severe organ dysfunction, review of pertinent labs and imaging studies, discussions with consulting providers and discussions with patient/family: 90 minutes.     Estefanía Laird MD  Department of Hospital Medicine   [facility]

## 2019-04-08 NOTE — ASSESSMENT & PLAN NOTE
Secondary to pulmonary edema with likely new onset CHF  Chest x-ray on 4/4 with edema as well  Unlikely to be pneumonia, but concerning for possible progression to ARDS  Continue aggressive diuresis with IV Lasix Q 12  Monitor daily weights and strict I/Os  Echo pending  Will check a procalcitonin level  Pulmonary is been consulted

## 2019-04-08 NOTE — ED NOTES
MD notified of pt's BPs trending down, last one 104/64. MD states to remove nitro paste. Nitro paste removed.

## 2019-04-08 NOTE — HPI
"53 year old male with PMH DM2, tobacco use, initially presented with complaints of shortness of breath x 1 week following an episode of chest pain. He presented to the ER at an outside facility with troponin 6.6 where he was admitted with NSTEMI, HTN emergency, respiratory failure and newly diagnosed cardiomyopathy. He also had a brief episode of AF with RVR that converted to NSR following an amiodarone bolus. He was taken to the cath lab and found to have 80% prox LAD lesion s/p PCI with SHIRA. During angiography the patient had catheter induced VT upon entering the LV requiring cardioversion and RHC revealed PCWP 31 and SPAP 56.     On the evening of 4/9/19 the patient was found to be unresponsive, received CPR and defibrillation for pulseless VT. Accelerated idioventricular rhythm was suspected and the patient was started on IV amiodarone. On 4/10/19 the patient experienced VT again requiring two shocks and 3 rounds of chest compressions before ROSC. Rhythm strips present in the chart appear to be polymorphic VT. The patient experienced another VT event requiring defibrillation and was taken to the cath lab for IABP placement.     Upon admission at [facility], the patient is accompanied by his wife. The patient reports that approximately one week prior to admission he developed epigastric discomfort which he felt was "gas pain". This pain occurred 2-3 times, was dull, non-radiating, had no aggravating or alleviating factors, and would resolve within an hour after taking pepcid and laying down. He had no recurrence of this discomfort, however during the following week the patient developed NYHA III WOODS, orthopnea, PND and peripheral edema. He denied palpitations or syncope. While hospitalized, he does not remember any events leading to any of his defibrillations; he only remembers being shocked and waking up surrounded by hospital staff. At this time the patient is comfortable and reports no symptoms or concerns.  "

## 2019-04-08 NOTE — ASSESSMENT & PLAN NOTE
Currently stable and likely congestive  Received amiodarone in the ED  Currently will do half dose statin and continue follow closely

## 2019-04-08 NOTE — ED NOTES
Pt converted back to NSR; HR from 180s now at 100. MD notified. MD states to finish amiodarone loading dose but then hold the amiodarone drip. Pt's BP also went to 133/76 while setting up nitro drip. MD notified. Hold nitro drip at this time, per MD.

## 2019-04-08 NOTE — ASSESSMENT & PLAN NOTE
Hold outpatient regimen for now  Monitor with Accu-Cheks and give sliding scale insulin as necessary  Glucose goal of 140-180 during hospitalization  Will check a hemoglobin A1c

## 2019-04-08 NOTE — Clinical Note
Catheter is removed from the ostium   left main. Angiography performed post intervention of the left coronary arteries. Angiography performed via hand injection with 5 mL of contrast.

## 2019-04-08 NOTE — Clinical Note
Catheter is inserted into the ostium   right coronary artery. Angiography performed of the right coronary arteries in multiple views.

## 2019-04-08 NOTE — SUBJECTIVE & OBJECTIVE
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      Current Outpatient Medications on File Prior to Encounter   Medication Sig    glimepiride (AMARYL) 4 MG tablet Take 1 tablet (4 mg total) by mouth daily with breakfast.    irbesartan (AVAPRO) 75 MG tablet Take 1 tablet (75 mg total) by mouth once daily.    blood sugar diagnostic Strp 1 strip by Misc.(Non-Drug; Combo Route) route 3 (three) times daily.    blood-glucose meter kit Use as instructed    furosemide (LASIX) 20 MG tablet Take 1 tablet (20 mg total) by mouth daily as needed (shortness of breath).    lancets Misc 1 application by Misc.(Non-Drug; Combo Route) route 3 (three) times daily.     Family History     Problem Relation (Age of Onset)    Diabetes Mother        Tobacco Use    Smoking status: Current Every Day Smoker     Packs/day: 1.50     Types: Cigarettes    Smokeless tobacco: Never Used    Tobacco comment: Quick 3 days ago   Substance and Sexual Activity    Alcohol use: Yes    Drug use: Never    Sexual activity: Not Currently     Review of Systems   Constitutional: Positive for appetite change. Negative for chills and fever.   HENT: Negative for sore throat.    Eyes: Negative for visual disturbance.   Respiratory: Positive for cough and shortness of breath.    Cardiovascular: Positive for leg swelling. Negative for chest pain.   Gastrointestinal:  Positive for nausea.   Endocrine: Negative for polyuria.   Genitourinary: Negative for dysuria.   Musculoskeletal: Negative for back pain.   Skin: Negative for rash.   Neurological: Positive for weakness. Negative for syncope and speech difficulty.   Psychiatric/Behavioral: Negative for confusion.     Objective:     Vital Signs (Most Recent):  Temp: 98.2 °F (36.8 °C) (04/08/19 0519)  Pulse: 93 (04/08/19 0700)  Resp: (!) 30 (04/08/19 0700)  BP: 116/78 (04/08/19 0700)  SpO2: 98 % (04/08/19 0700) Vital Signs (24h Range):  Temp:  [98.2 °F (36.8 °C)-99.1 °F (37.3 °C)] 98.2 °F (36.8 °C)  Pulse:  [] 93  Resp:  [28-44] 30  SpO2:  [79 %-100 %] 98 %  BP: (102-215)/() 116/78     Weight: 109.5 kg (241 lb 6.5 oz)  Body mass index is 33.67 kg/m².    Physical Exam   Constitutional: He is oriented to person, place, and time. He appears well-developed. He appears distressed.   HENT:   Head: Normocephalic and atraumatic.   Eyes: Pupils are equal, round, and reactive to light.   Neck: Normal range of motion. Neck supple.   Cardiovascular: Normal rate and regular rhythm.   Pulmonary/Chest:   Coarse breath sound with crackles and decreased at bases, slight increased work of breathing   Abdominal: Bowel sounds are normal. He exhibits no distension and no mass. There is no tenderness. There is no rebound and no guarding.   Musculoskeletal: Normal range of motion. He exhibits edema.   Trace edema to lower extremities   Neurological: He is alert and oriented to person, place, and time.   Skin: Skin is warm and dry. Capillary refill takes less than 2 seconds.   Psychiatric: He has a normal mood and affect. His behavior is normal. Thought content normal.         CRANIAL NERVES     CN III, IV, VI   Pupils are equal, round, and reactive to light.       Significant Labs: All pertinent labs within the past 24 hours have been reviewed.    Significant Imaging: I have reviewed and interpreted all pertinent imaging results/findings within  the past 24 hours.

## 2019-04-08 NOTE — Clinical Note
Catheter is repositioned to the ostium   left main. Angiography performed of the left coronary arteries in multiple views. Angiography performed via hand injection with 20 mL of contrast.

## 2019-04-08 NOTE — ASSESSMENT & PLAN NOTE
Improved and stable post IV administration of beta-blocker and ACE-inhibitor  Given 1 dose of ARB

## 2019-04-08 NOTE — CONSULTS
[facility]  Pulmonology  Consult Note    Patient Name: Kristopher Kevin  MRN: [id number]  Admission Date: 4/8/2019  Hospital Length of Stay: 0 days  Code Status: Full Code  Attending Physician: Estefanía Laird MD  Primary Care Provider: Jhoan Lundy MD   Principal Problem: NSTEMI (non-ST elevated myocardial infarction)    Inpatient consult to Pulmonology  Consult performed by: Mylene Camacho, YOHAN  Consult ordered by: Chapin Pizarro MD        Subjective:     HPI:  Mr. Kevin is a 52 yo male with history significant for DM, HTN, tobacco abuse (30 pack years) who was admitted to [facility] ICU on 4/8 for acute hypoxemic and hypercapnic respiratory failure and NSTEMI. Patient reports having a viral type upper respiratory tract infectious symptoms about 7-10 days prior to presentation with possible fever, chills, cough, shortness of breath, nausea and emesis. The symptoms reportedly improved except for the shortness of breath. He states that he has been increasingly SOB, noted LE swelling, and orthopnea over the past 5 days. He woke up in the middle of the night, felt significantly SOB and unable to catch his breath, so family brought him to the ED for evaluation. Upon ED arrival he was in hypertensive emergency with SBP's 210's, hypoxemic with saturations in 70's, diaphoretic and in respiratory distress. IV antihypertensives given with improvement, and he was started on NIPPV and diuresed. EKG noted ST elevation in anterolateral leads and troponin resulted at 6. Patient denied chest pain. Cardiology consulted. Pulmonary consulted for assistance in management of respiratory failure.     Of note, he was seen in the family medicine clinic to establish a PCP on 4/4, and a CXR was obtained on 4/5. CXR then noted bilateral alveolar opacities, concerning for pulmonary edema at that time. Lasix ordered by PCP, but patient had not yet filled the prescription.         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    Hyperlipidemia 2/13/2017    Impotence of organic origin 8/14/2012    Microalbuminuria 2/13/2017    Nodular prostate without urinary obstruction 8/14/2012    Tobacco use disorder 8/14/2012       Past Surgical History:   Procedure Laterality Date    HERNIA REPAIR         Review of patient's allergies indicates:  No Known Allergies    Family History     Problem Relation (Age of Onset)    Diabetes Mother        Tobacco Use    Smoking status: Current Every Day Smoker     Packs/day: 1.50     Types: Cigarettes    Smokeless tobacco: Never Used    Tobacco comment: Quick 3 days ago   Substance and Sexual Activity    Alcohol use: Yes    Drug use: Never    Sexual activity: Not Currently         Review of Systems   Constitutional: Positive for chills and fatigue. Negative for fever.   Respiratory: Positive for cough and shortness of breath.    Cardiovascular: Positive for leg swelling. Negative for chest pain.   Gastrointestinal: Positive for abdominal distention. Negative for abdominal pain, nausea and vomiting.   Neurological: Negative for syncope.     Objective:     Vital Signs (Most Recent):  Temp: 98.2 °F (36.8 °C) (04/08/19 0519)  Pulse: 93 (04/08/19 0700)  Resp: (!) 30 (04/08/19 0700)  BP: 116/78 (04/08/19 0700)  SpO2: 98 % (04/08/19 0700) Vital Signs (24h Range):  Temp:  [98.2 °F (36.8 °C)-99.1 °F (37.3 °C)] 98.2 °F (36.8 °C)  Pulse:  [] 93  Resp:  [28-44] 30  SpO2:  [79 %-100 %] 98 %  BP: (102-215)/() 116/78     Weight: 109.3 kg (241 lb)  Body mass index is 33.61 kg/m².      Intake/Output Summary (Last 24 hours) at 4/8/2019 0811  Last data filed at 4/8/2019 0630  Gross per 24 hour   Intake 100 ml   Output 275 ml   Net -175 ml       Physical Exam   Constitutional: He is oriented to person, place, and time. He  appears well-developed. He has a sickly appearance. He appears ill. No distress.   HENT:   Head: Normocephalic and atraumatic.   Eyes: Conjunctivae and EOM are normal. Right eye exhibits no discharge. Left eye exhibits no discharge. No scleral icterus.   Neck: Normal range of motion. Neck supple. JVD present. No tracheal deviation present. No thyromegaly present.   Cardiovascular: Normal rate, regular rhythm, S1 normal and S2 normal.   No murmur heard.  Pulses:       Radial pulses are 2+ on the right side, and 2+ on the left side.        Dorsalis pedis pulses are 2+ on the right side, and 2+ on the left side.   Trace LE edema bilaterally. Warm extremities   Pulmonary/Chest: No accessory muscle usage. Tachypnea noted. No respiratory distress. He has no decreased breath sounds. He has no wheezes. He has rales in the right upper field, the right middle field, the left middle field and the left lower field.   Abdominal: Soft. Bowel sounds are normal. He exhibits distension. There is no tenderness. There is no guarding.   Lymphadenopathy:     He has no cervical adenopathy.   Neurological: He is alert and oriented to person, place, and time.   Conversant, moving all extremities spontaneously    Skin: Skin is dry. He is not diaphoretic. No pallor.       Vents:       Lines/Drains/Airways     Drain                 Urethral Catheter 04/08/19 0630 Non-latex;Straight-tip 16 Fr. less than 1 day          Peripheral Intravenous Line                 Peripheral IV - Single Lumen 04/08/19 0251 Left Hand less than 1 day         Peripheral IV - Single Lumen 04/08/19 0251 Right Antecubital less than 1 day                Significant Labs:    CBC/Anemia Profile:  Recent Labs   Lab 04/08/19  0245   WBC 13.40*   HGB 14.3   HCT 42.8   *   MCV 87   RDW 12.6        Chemistries:  Recent Labs   Lab 04/08/19  0245      K 4.0      CO2 21*   BUN 19   CREATININE 1.0   CALCIUM 10.0   ALBUMIN 3.3*   PROT 8.1   BILITOT 1.1*   ALKPHOS  89   ALT 59*   AST 57*         Significant Imaging:   I have reviewed all pertinent imaging results/findings within the past 24 hours.    Assessment/Plan:     * NSTEMI (non-ST elevated myocardial infarction)  --cardiology following. Likely LHC today  --continue ASA, plavix  --troponins downtrending    Acute respiratory failure with hypoxia and hypercapnia  --cardiogenic pulmonary edema certainly a contributor given HTN emergency, NSTEMI, acute on chronic HFrEF presentation. Likely component of superimposed infectious etiology, CAP  --agree with BP control, diuresis.   --recommend adding azithromycin, zosyn, and vancomycin empirically for now. Respiratory viral panel and sputum culture pending.   --continue Bipap. Have adjusted settings to 15/10, 30% and tolerating well. Would continue through day, likely to go for LHC in afternoon. Possibly wean from Bipap to NC following LHC if has diuresed well and hemodynamics improved.         Acute pulmonary edema  --2/2 above    Abnormal LFTs  --agree with checking hepatitis panel. Other considerations are congestive hepatopathy vs GARZA          Thank you for your consult. Patient seen and examined by Dr. Crandall.     Critical care time: 50 minutes     Mylene Camacho NP  Pulmonology  [facility]

## 2019-04-08 NOTE — PLAN OF CARE
04/08/19 1053   Discharge Assessment   Assessment Type Discharge Planning Assessment   Confirmed/corrected address and phone number on facesheet? Yes   Assessment information obtained from? Patient   Communicated expected length of stay with patient/caregiver no   Prior to hospitilization cognitive status: Unable to Assess   Prior to hospitalization functional status: Independent   Current cognitive status: Alert/Oriented   Current Functional Status: Independent   Facility Arrived From: home   Lives With child(magen), adult;spouse   Able to Return to Prior Arrangements yes   Is patient able to care for self after discharge? Yes   Who are your caregiver(s) and their phone number(s)? Estelita (Spouse) [phone]   Readmission Within the Last 30 Days no previous admission in last 30 days   Patient currently being followed by outpatient case management? No   Patient currently receives any other outside agency services? No   Equipment Currently Used at Home none   Do you have any problems affording any of your prescribed medications? No   Is the patient taking medications as prescribed? yes   Does the patient have transportation home? Yes   Transportation Anticipated family or friend will provide   Dialysis Name and Scheduled days N/A   Does the patient receive services at the [facility]? No   Discharge Plan A Home with family   Discharge Plan B Home Health   DME Needed Upon Discharge  other (see comments)  (if ordered, BIPAP, pt is using BIPAP in ICU.)   Patient/Family in Agreement with Plan yes   SW to pt's room to complete initial discharge planning assessment, discuss helping the pt manage care at home, and identifying health care preferences.     Pt identified using their name and date of birth as the two identifiers.    SW introduced self and the care management role in discharge planning was explained to the pt.    SW's name and contact info placed on the pts white board in their room.    The pt identified  Estelita (Spouse) as a person who will help at home if needed.     During discharge planning, the SW will make efforts to schedule appointments using the pts preferred appointment time: Afternoon    Pt was using BiPAP at time of assessment so full conversations were difficult. Further assessment may be warranted when not in distress.     Case Management SW or TN will continue to plan for pts discharge throughout their inpatient stay.

## 2019-04-08 NOTE — SUBJECTIVE & OBJECTIVE
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      Current Outpatient Medications on File Prior to Encounter   Medication Sig    glimepiride (AMARYL) 4 MG tablet Take 1 tablet (4 mg total) by mouth daily with breakfast.    irbesartan (AVAPRO) 75 MG tablet Take 1 tablet (75 mg total) by mouth once daily.    blood sugar diagnostic Strp 1 strip by Misc.(Non-Drug; Combo Route) route 3 (three) times daily.    blood-glucose meter kit Use as instructed    furosemide (LASIX) 20 MG tablet Take 1 tablet (20 mg total) by mouth daily as needed (shortness of breath).    lancets Misc 1 application by Misc.(Non-Drug; Combo Route) route 3 (three) times daily.     Family History     Problem Relation (Age of Onset)    Diabetes Mother        Tobacco Use    Smoking status: Current Every Day Smoker     Packs/day: 1.50     Types: Cigarettes    Smokeless tobacco: Never Used    Tobacco comment: Quick 3 days ago   Substance and Sexual Activity    Alcohol use: Yes    Drug use: Never    Sexual activity: Not Currently     Review of Systems   Unable to perform ROS: severe respiratory distress   Constitution: Negative.   HENT: Negative.    Eyes: Negative.    Cardiovascular: Positive for dyspnea on exertion, leg swelling, orthopnea and paroxysmal nocturnal dyspnea. Negative for chest pain, irregular heartbeat, near-syncope, palpitations and syncope.    Respiratory: Positive for cough and shortness of breath.    Skin: Negative.    Musculoskeletal: Negative.    Gastrointestinal: Negative for abdominal pain, constipation and diarrhea.   Genitourinary: Negative for dysuria.   Neurological: Negative for dizziness.   Psychiatric/Behavioral: Negative.      Objective:     Vital Signs (Most Recent):  Temp: 98.2 °F (36.8 °C) (04/08/19 0519)  Pulse: 93 (04/08/19 0700)  Resp: (!) 30 (04/08/19 0700)  BP: 116/78 (04/08/19 0700)  SpO2: 98 % (04/08/19 0700) Vital Signs (24h Range):  Temp:  [98.2 °F (36.8 °C)-99.1 °F (37.3 °C)] 98.2 °F (36.8 °C)  Pulse:  [] 93  Resp:  [28-44] 30  SpO2:  [79 %-100 %] 98 %  BP: (102-215)/() 116/78     Weight: 109.3 kg (241 lb)  Body mass index is 33.61 kg/m².    SpO2: 98 %  O2 Device (Oxygen Therapy): BiPAP      Intake/Output Summary (Last 24 hours) at 4/8/2019 0804  Last data filed at 4/8/2019 0630  Gross per 24 hour   Intake 100 ml   Output 275 ml   Net -175 ml       Lines/Drains/Airways     Drain                 Urethral Catheter 04/08/19 0630 Non-latex;Straight-tip 16 Fr. less than 1 day          Peripheral Intravenous Line                 Peripheral IV - Single Lumen 04/08/19 0251 Left Hand less than 1 day         Peripheral IV - Single Lumen 04/08/19 0251 Right Antecubital less than 1 day                Physical Exam   Constitutional: He is oriented to person, place, and time. He appears well-developed and well-nourished. He appears distressed.   HENT:   Head: Normocephalic and atraumatic.   Eyes: Pupils are equal, round, and reactive to light. Conjunctivae and EOM are normal.   Neck: Normal range of motion. Neck supple. No thyromegaly present.   Cardiovascular: Normal rate, regular rhythm and normal heart sounds.   No murmur heard.  Pulmonary/Chest: He is in respiratory distress. He has no wheezes. He has rales. He exhibits no tenderness.   Course decreased breath sounds bilaterally   Abdominal: Soft. Bowel sounds are normal.    Musculoskeletal: He exhibits edema.   Neurological: He is alert and oriented to person, place, and time.   Skin: Skin is warm and dry.   Psychiatric: He has a normal mood and affect. His behavior is normal.       Significant Labs:   ABG:   Recent Labs   Lab 04/08/19  0329   PH 7.265*   PCO2 52.1*   HCO3 23.6*   POCSATURATED 97   BE -4   , CMP   Recent Labs   Lab 04/08/19  0245      K 4.0      CO2 21*   *   BUN 19   CREATININE 1.0   CALCIUM 10.0   PROT 8.1   ALBUMIN 3.3*   BILITOT 1.1*   ALKPHOS 89   AST 57*   ALT 59*   ANIONGAP 14   ESTGFRAFRICA >60   EGFRNONAA >60   , CBC   Recent Labs   Lab 04/08/19  0245   WBC 13.40*   HGB 14.3   HCT 42.8   *   , INR   Recent Labs   Lab 04/08/19  0245   INR 1.0   , Lipid Panel No results for input(s): CHOL, HDL, LDLCALC, TRIG, CHOLHDL in the last 48 hours. and Troponin   Recent Labs   Lab 04/08/19  0245   TROPONINI 6.610*     BNP- 567    Significant Imaging: X-Ray: CXR:   Interval worsening of patchy bilateral mixed interstitial and alveolar opacities compared to prior examination.  Findings may relate to edema, ARDS, or multifocal infection.  Clinical correlation is advised.

## 2019-04-08 NOTE — ASSESSMENT & PLAN NOTE
Patient with abnormal liver function tests ongoing for some time  Will check hepatitis panel and continue monitor with repeat labs

## 2019-04-08 NOTE — TELEPHONE ENCOUNTER
Sent lasix to pharmacy to take daily as needed for shortness of breath. I would like for him to take this daily for about 4 days. 2d echo ordered to evaluate heart function. Keep glucose logs, and f/u in 2 weeks.

## 2019-04-08 NOTE — ED TRIAGE NOTES
Pt presents to ED via POV with c/o SOB that began a week ago but got worse x 1 hour ago. Pt reports cough and not being able to breath when lying down. Pt diaphoretic upon ED arrival, tachypenic and using abd muscles to breath. Pt sats 80% in triage. Pt denies CP at this time and only complains of SOB. He denies cardiac hx of breathing issues. Pt roomed and placed on BIPAP. Will continue to be monitored.

## 2019-04-08 NOTE — ASSESSMENT & PLAN NOTE
Patient with likely new onset CHF given presentation and high BNP  Echo was pending  Diuresis and progress with IV Lasix

## 2019-04-08 NOTE — ASSESSMENT & PLAN NOTE
Will check a lipid panel  Patient with abnormal liver function status which may preclude statin therapy

## 2019-04-08 NOTE — ASSESSMENT & PLAN NOTE
Patient does not have history of hypertension  Presented with blood pressure of 215/112 with a pulse of 119  Patient was given IV antihypertensive medications with response in blood pressure  Nitroglycerin drip was ordered but never initiated  Blood pressure normal range at 110-120 systolic  Likely induced by volume overload state with good response with diuresis

## 2019-04-08 NOTE — Clinical Note
Catheter is inserted into the ostium   left main. Angiography performed of the left coronary arteries in multiple views.

## 2019-04-08 NOTE — Clinical Note
The site was marked. Prepped: right radial. Prepped with: ChloraPrep. The site was clipped. The patient was draped.

## 2019-04-08 NOTE — CONSULTS
[facility]  Cardiology  Consult Note    Patient Name: Kristopher Kevin  MRN: [id number]  Admission Date: 4/8/2019  Hospital Length of Stay: 0 days  Code Status: Full Code   Attending Provider: Estefanía Laird MD   Consulting Provider: Adrian Jacobo MD  Primary Care Physician: Jhoan Lundy MD  Principal Problem:NSTEMI (non-ST elevated myocardial infarction)    Patient information was obtained from patient, spouse/SO, past medical records and ER records.     Inpatient consult to Cardiology  Consult performed by: Adrian Jacobo MD  Consult ordered by: Genevieve Valdez MD  Reason for consult: Hypertensive emergency, non-STEMI        Subjective:     Chief Complaint:  Shortness of breath     HPI:   53-year-old male who presents via family transportation with acute severe shortness of breath.  Patient has been having mild shortness of breath and a nonproductive cough over the last 2 weeks.  He felt okay when he went to bed tonight but developed acute severe shortness of breath that woke him from sleep around an hour PTA.      He denies any previous cardiac issues.  He has not had any testing but echocardiogram was pending after recent initiation with new PCP.  He has been noncompliant with medicines and had not picked up new prescriptions.  He presented with hypertensive emergency and volume overload.  I was called for acute ST segment changes.  In speaking with the ED physician in reviewing the chart the patient had no complaints of chest pain.  There were no reciprocal changes noted and main recommendation was for blood pressure control and diuresis with presenting respiratory failure and significant hypertension.  Preliminary echo at bedside shows reduced LV function approximately 30%.  Troponin is elevated consistent with non ST elevation MI.  Repeat EKG this a.m. now the blood pressure is better controlled shows improvement of ST segment changes.  He is currently stable on BiPAP mostly  complains of inability to catch his breath.  He denies any chest pain on presentation and when asked by me.  He is complaining of PND, orthopnea and stable lower extremity edema.  He has not experienced any dizziness, presyncope or syncope.  Discussed with primary Dr. Laird who said he told her that this all started with an episode which he thought was virally related with chest pain and shortness of breath associated with nausea and vomiting which improved post emesis.  Did have a brief episode of AFib with RVR which converted with amiodarone bolus which is now been discontinued.    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      Current Outpatient Medications on File Prior to Encounter   Medication Sig    glimepiride (AMARYL) 4 MG tablet Take 1 tablet (4 mg total) by mouth daily with breakfast.    irbesartan (AVAPRO) 75 MG tablet Take 1 tablet (75 mg total) by mouth once daily.    blood sugar diagnostic Strp 1 strip by Misc.(Non-Drug; Combo Route) route 3 (three) times daily.    blood-glucose meter kit Use as instructed    furosemide (LASIX) 20 MG tablet Take 1 tablet (20 mg total) by mouth daily as needed (shortness of breath).    lancets Misc 1 application by Misc.(Non-Drug; Combo Route) route 3 (three) times daily.     Family History     Problem Relation (Age of Onset)    Diabetes Mother        Tobacco Use    Smoking  status: Current Every Day Smoker     Packs/day: 1.50     Types: Cigarettes    Smokeless tobacco: Never Used    Tobacco comment: Quick 3 days ago   Substance and Sexual Activity    Alcohol use: Yes    Drug use: Never    Sexual activity: Not Currently     Review of Systems   Unable to perform ROS: severe respiratory distress   Constitution: Negative.   HENT: Negative.    Eyes: Negative.    Cardiovascular: Positive for dyspnea on exertion, leg swelling, orthopnea and paroxysmal nocturnal dyspnea. Negative for chest pain, irregular heartbeat, near-syncope, palpitations and syncope.   Respiratory: Positive for cough and shortness of breath.    Skin: Negative.    Musculoskeletal: Negative.    Gastrointestinal: Negative for abdominal pain, constipation and diarrhea.   Genitourinary: Negative for dysuria.   Neurological: Negative for dizziness.   Psychiatric/Behavioral: Negative.      Objective:     Vital Signs (Most Recent):  Temp: 98.2 °F (36.8 °C) (04/08/19 0519)  Pulse: 93 (04/08/19 0700)  Resp: (!) 30 (04/08/19 0700)  BP: 116/78 (04/08/19 0700)  SpO2: 98 % (04/08/19 0700) Vital Signs (24h Range):  Temp:  [98.2 °F (36.8 °C)-99.1 °F (37.3 °C)] 98.2 °F (36.8 °C)  Pulse:  [] 93  Resp:  [28-44] 30  SpO2:  [79 %-100 %] 98 %  BP: (102-215)/() 116/78     Weight: 109.3 kg (241 lb)  Body mass index is 33.61 kg/m².    SpO2: 98 %  O2 Device (Oxygen Therapy): BiPAP      Intake/Output Summary (Last 24 hours) at 4/8/2019 0804  Last data filed at 4/8/2019 0630  Gross per 24 hour   Intake 100 ml   Output 275 ml   Net -175 ml       Lines/Drains/Airways     Drain                 Urethral Catheter 04/08/19 0630 Non-latex;Straight-tip 16 Fr. less than 1 day          Peripheral Intravenous Line                 Peripheral IV - Single Lumen 04/08/19 0251 Left Hand less than 1 day         Peripheral IV - Single Lumen 04/08/19 0251 Right Antecubital less than 1 day                Physical Exam   Constitutional: He is oriented  to person, place, and time. He appears well-developed and well-nourished. He appears distressed.   HENT:   Head: Normocephalic and atraumatic.   Eyes: Pupils are equal, round, and reactive to light. Conjunctivae and EOM are normal.   Neck: Normal range of motion. Neck supple. No thyromegaly present.   Cardiovascular: Normal rate, regular rhythm and normal heart sounds.   No murmur heard.  Pulmonary/Chest: He is in respiratory distress. He has no wheezes. He has rales. He exhibits no tenderness.   Course decreased breath sounds bilaterally   Abdominal: Soft. Bowel sounds are normal.   Musculoskeletal: He exhibits edema.   Neurological: He is alert and oriented to person, place, and time.   Skin: Skin is warm and dry.   Psychiatric: He has a normal mood and affect. His behavior is normal.       Significant Labs:   ABG:   Recent Labs   Lab 04/08/19  0329   PH 7.265*   PCO2 52.1*   HCO3 23.6*   POCSATURATED 97   BE -4   , CMP   Recent Labs   Lab 04/08/19  0245      K 4.0      CO2 21*   *   BUN 19   CREATININE 1.0   CALCIUM 10.0   PROT 8.1   ALBUMIN 3.3*   BILITOT 1.1*   ALKPHOS 89   AST 57*   ALT 59*   ANIONGAP 14   ESTGFRAFRICA >60   EGFRNONAA >60   , CBC   Recent Labs   Lab 04/08/19  0245   WBC 13.40*   HGB 14.3   HCT 42.8   *   , INR   Recent Labs   Lab 04/08/19  0245   INR 1.0   , Lipid Panel No results for input(s): CHOL, HDL, LDLCALC, TRIG, CHOLHDL in the last 48 hours. and Troponin   Recent Labs   Lab 04/08/19  0245   TROPONINI 6.610*     BNP- 567    Significant Imaging: X-Ray: CXR:   Interval worsening of patchy bilateral mixed interstitial and alveolar opacities compared to prior examination.  Findings may relate to edema, ARDS, or multifocal infection.  Clinical correlation is advised.    Assessment and Plan:     * NSTEMI (non-ST elevated myocardial infarction)  ST segment changes with possible hypertensive cardiomyopathy versus late presenting MI  Follow troponin trend but currently  chest pain-free  Plan for bilateral heart catheterization when stable, possibly today if further deterioration or significant upward trend of cardiac marker  Loaded on anti-platelet therapy  Received 1 dose of Lovenox which will now be held pending possible procedure    Hypertensive emergency  Improved and stable post IV administration of beta-blocker and ACE-inhibitor  Given 1 dose of ARB      Acute congestive heart failure  Combined systolic and diastolic  Diuresis as tolerated  Add carvedilol    Acute hypoxemic respiratory failure  Secondary to pulmonary edema  Currently stable on BiPAP  Pulmonary following    Diabetes mellitus type II, uncontrolled  Per IM    Tobacco use disorder  Counseled    Hyperlipidemia  Initiate statin  Follow LFTs    Obesity, unspecified  Diet and exercise    Abnormal LFTs  Currently stable and likely congestive  Received amiodarone in the ED  Currently will do half dose statin and continue follow closely        VTE Risk Mitigation (From admission, onward)        Ordered     IP VTE HIGH RISK PATIENT  Once      04/08/19 0519     Place NANCY hose  Until discontinued      04/08/19 0519     Place sequential compression device  Until discontinued      04/08/19 0519        * total ICU time spent on case 45 min    Thank you for your consult. I will follow-up with patient. Please contact us if you have any additional questions.    Adrian Jacobo MD  Cardiology   [facility]

## 2019-04-08 NOTE — NURSING
0540- Spoke w/EICU doctor, reported that Cardiology spoke to ER MD. Told EICU doctor that we have a nocturnist available on night shift. EICU doctor recommends that he been seen by nocturnist and that they speak w/ cardiology.  0620- Spoke with Dr. Laird regarding nocturnist gone for night and EICU doctor recommends immediate doctor evaluation.  0625- Spoke w/ Dr. Jacobo who is aware that the patient's Troponin is 6.6.  0630- Dr. Pizarro here on unit to see patient.

## 2019-04-08 NOTE — ASSESSMENT & PLAN NOTE
Patient quit smoking 3 days ago  Smoking cessation counseling done and continued encouragement given for > 3 min   WDL

## 2019-04-08 NOTE — HPI
"53-year-old male with HLP, diabetes type 2, abnormal liver function tests, irregular heartbeat, tobacco abuse (quit 3 days ago) who presented with worsening shortness of breath x 1 week.  He reports shortness breath started 1 week ago when he "fell ill" with some sort of viral syndrome with associated nausea and vomiting, shortness of breath and chest pain which he attributed to throwing up the self-resolved.  He went to establish care with a new primary care physician and was seen on 04/04/2019 and workup with a chest x-ray was done on 04/05 consistent with pulmonary edema and Lasix was called in for the patient but this medication was never picked up.  He reports shortness of breath gradually continued to get worse, and then finding last night he could no longer lay flat given that he felt like he was being smothered with any attempt.  He reported swelling in his lower extremities; dry cough.  No fevers or chills, no recent travels, no sick contacts.  No reported chest pain since the initial episode that self-resolved 1 week ago.  In the ER, he was in respiratory distress and was placed on BiPAP.  His BNP was 576, initial troponin of 6.610, LFTs with total bili 1.1 and AST/ALT of 57/59, WBC count of 13.40, ABG with pH of 7.265 pCO2 52.1 PO2 of 99 and bicarb of 23.6 on BiPAP, chest x-ray with interval worsening of patchy bilateral mixed interstitial and alveolar opacities compared to prior examination.  Findings may relate to edema, ARDS, or multifocal infection.  EKG reviewed with multiple leads with questionable ST elevation.  "

## 2019-04-08 NOTE — Clinical Note
Catheter is repositioned to the ostium   right coronary artery. Angiography performed of the right coronary arteries. Angiography performed via hand injection with 5 mL of contrast.

## 2019-04-08 NOTE — SUBJECTIVE & OBJECTIVE
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    Microalbuminuria 2/13/2017    Nodular prostate without urinary obstruction 8/14/2012    Tobacco use disorder 8/14/2012       Past Surgical History:   Procedure Laterality Date    HERNIA REPAIR         Review of patient's allergies indicates:  No Known Allergies    Family History     Problem Relation (Age of Onset)    Diabetes Mother        Tobacco Use    Smoking status: Current Every Day Smoker     Packs/day: 1.50     Types: Cigarettes    Smokeless tobacco: Never Used    Tobacco comment: Quick 3 days ago   Substance and Sexual Activity    Alcohol use: Yes    Drug use: Never    Sexual activity: Not Currently         Review of Systems   Constitutional: Positive for chills and fatigue. Negative for fever.   Respiratory: Positive for cough and shortness of breath.    Cardiovascular: Positive for leg swelling. Negative for chest pain.   Gastrointestinal: Positive for abdominal distention. Negative for abdominal pain, nausea and vomiting.   Neurological: Negative for syncope.     Objective:     Vital Signs (Most Recent):  Temp: 98.2 °F (36.8 °C) (04/08/19 0519)  Pulse: 93 (04/08/19 0700)  Resp: (!) 30 (04/08/19 0700)  BP: 116/78 (04/08/19 0700)  SpO2: 98 % (04/08/19 0700) Vital Signs (24h Range):  Temp:  [98.2 °F (36.8 °C)-99.1 °F (37.3 °C)] 98.2 °F (36.8 °C)  Pulse:  [] 93  Resp:  [28-44] 30  SpO2:  [79 %-100 %] 98 %  BP: (102-215)/() 116/78     Weight: 109.3 kg (241 lb)  Body mass index is 33.61 kg/m².      Intake/Output Summary (Last 24 hours) at 4/8/2019 0811  Last data filed at 4/8/2019 0630  Gross per 24 hour   Intake 100 ml   Output 275 ml   Net -175 ml       Physical Exam   Constitutional: He is  oriented to person, place, and time. He appears well-developed. He has a sickly appearance. He appears ill. No distress.   HENT:   Head: Normocephalic and atraumatic.   Eyes: Conjunctivae and EOM are normal. Right eye exhibits no discharge. Left eye exhibits no discharge. No scleral icterus.   Neck: Normal range of motion. Neck supple. JVD present. No tracheal deviation present. No thyromegaly present.   Cardiovascular: Normal rate, regular rhythm, S1 normal and S2 normal.   No murmur heard.  Pulses:       Radial pulses are 2+ on the right side, and 2+ on the left side.        Dorsalis pedis pulses are 2+ on the right side, and 2+ on the left side.   Trace LE edema bilaterally. Warm extremities   Pulmonary/Chest: No accessory muscle usage. Tachypnea noted. No respiratory distress. He has no decreased breath sounds. He has no wheezes. He has rales in the right upper field, the right middle field, the left middle field and the left lower field.   Abdominal: Soft. Bowel sounds are normal. He exhibits distension. There is no tenderness. There is no guarding.   Lymphadenopathy:     He has no cervical adenopathy.   Neurological: He is alert and oriented to person, place, and time.   Conversant, moving all extremities spontaneously    Skin: Skin is dry. He is not diaphoretic. No pallor.       Vents:       Lines/Drains/Airways     Drain                 Urethral Catheter 04/08/19 0630 Non-latex;Straight-tip 16 Fr. less than 1 day          Peripheral Intravenous Line                 Peripheral IV - Single Lumen 04/08/19 0251 Left Hand less than 1 day         Peripheral IV - Single Lumen 04/08/19 0251 Right Antecubital less than 1 day                Significant Labs:    CBC/Anemia Profile:  Recent Labs   Lab 04/08/19  0245   WBC 13.40*   HGB 14.3   HCT 42.8   *   MCV 87   RDW 12.6        Chemistries:  Recent Labs   Lab 04/08/19  0245      K 4.0      CO2 21*   BUN 19   CREATININE 1.0   CALCIUM 10.0   ALBUMIN  3.3*   PROT 8.1   BILITOT 1.1*   ALKPHOS 89   ALT 59*   AST 57*         Significant Imaging:   I have reviewed all pertinent imaging results/findings within the past 24 hours.

## 2019-04-08 NOTE — Clinical Note
proximal   left anterior descending. Angiography performed post intervention of the left coronary arteries.

## 2019-04-08 NOTE — HPI
Mr. Kevin is a 52 yo male with history significant for DM, HTN, tobacco abuse (30 pack years) who was admitted to [facility] ICU on 4/8 for acute hypoxemic and hypercapnic respiratory failure and NSTEMI. Patient reports having a viral type upper respiratory tract infectious symptoms about 7-10 days prior to presentation with possible fever, chills, cough, shortness of breath, nausea and emesis. The symptoms reportedly improved except for the shortness of breath. He states that he has been increasingly SOB, noted LE swelling, and orthopnea over the past 5 days. He woke up in the middle of the night, felt significantly SOB and unable to catch his breath, so family brought him to the ED for evaluation. Upon ED arrival he was in hypertensive emergency with SBP's 210's, hypoxemic with saturations in 70's, diaphoretic and in respiratory distress. IV antihypertensives given with improvement, and he was started on NIPPV and diuresed. EKG noted ST elevation in anterolateral leads and troponin resulted at 6. Patient denied chest pain. Cardiology consulted. Pulmonary consulted for assistance in management of respiratory failure.     Of note, he was seen in the family medicine clinic to establish a PCP on 4/4, and a CXR was obtained on 4/5. CXR then noted bilateral alveolar opacities, concerning for pulmonary edema at that time. Lasix ordered by PCP, but patient had not yet filled the prescription.

## 2019-04-08 NOTE — ASSESSMENT & PLAN NOTE
Patient with chest pain for 1 brief episode 1 week ago followed by progressive shortness of breath  Patient definitely has a NSTEMI with a troponin of 6.610  EKG concerning for possible STEMI but this was reviewed by Cardiology  Patient treated with full-dose Lovenox for anticoagulation  He has been loaded with aspirin and Plavix, statin not given given abnormal liver function tests  Blood pressure in the normal range currently  Echo pending  Will continue trend markers and monitor closely in the ICU  Cardiology consulted

## 2019-04-09 NOTE — PROGRESS NOTES
"[facility]  Progress Note    Patient Name: Kristopher Kevin  MRN: [id number]  Patient Class: IP- Inpatient   Admission Date: 4/8/2019  Length of Stay: 1 days  Attending Physician: Nguyễn Moise MD  Primary Care Provider: Jhoan Lundy MD        Subjective:     Principal Problem:NSTEMI (non-ST elevated myocardial infarction)    HPI:  53-year-old male with HLP, diabetes type 2, abnormal liver function tests, irregular heartbeat, tobacco abuse (quit 3 days ago) who presented with worsening shortness of breath x 1 week.  He reports shortness breath started 1 week ago when he "fell ill" with some sort of viral syndrome with associated nausea and vomiting, shortness of breath and chest pain which he attributed to throwing up the self-resolved.  He went to establish care with a new primary care physician and was seen on 04/04/2019 and workup with a chest x-ray was done on 04/05 consistent with pulmonary edema and Lasix was called in for the patient but this medication was never picked up.  He reports shortness of breath gradually continued to get worse, and then finding last night he could no longer lay flat given that he felt like he was being smothered with any attempt.  He reported swelling in his lower extremities; dry cough.  No fevers or chills, no recent travels, no sick contacts.  No reported chest pain since the initial episode that self-resolved 1 week ago.  In the ER, he was in respiratory distress and was placed on BiPAP.  His BNP was 576, initial troponin of 6.610, LFTs with total bili 1.1 and AST/ALT of 57/59, WBC count of 13.40, ABG with pH of 7.265 pCO2 52.1 PO2 of 99 and bicarb of 23.6 on BiPAP, chest x-ray with interval worsening of patchy bilateral mixed interstitial and alveolar opacities compared to prior examination.  Findings may relate to edema, ARDS, or multifocal infection.  EKG reviewed with multiple leads with questionable ST elevation.    Hospital " Course:  53-year-old male with HLP, diabetes type 2, abnormal liver function tests, irregular heartbeat, tobacco abuse (quit 3 days ago) who presented 4/8/19 with worsening shortness of breath x 1 week.  The patient was found to have a non-stemi with a trop > 6 on presentation.  Cards was consulted and the patient went to the ICU with Bi-pap support on 4/8 for hypercapnic resp. Failure. Cards took patient to [facility] on 4/9/19.    Interval History: No new issues     Review of Systems   Constitutional: Negative for activity change.   HENT: Negative for congestion.    Respiratory: Negative for chest tightness and shortness of breath.    Cardiovascular: Negative for chest pain.   Gastrointestinal: Negative for abdominal pain.   Genitourinary: Negative for difficulty urinating.     Objective:     Vital Signs (Most Recent):  Temp: 99.8 °F (37.7 °C) (04/09/19 0715)  Pulse: 97 (04/09/19 1000)  Resp: (!) 24 (04/09/19 1000)  BP: 128/83 (04/09/19 1000)  SpO2: 98 % (04/09/19 1000) Vital Signs (24h Range):  Temp:  [98.4 °F (36.9 °C)-100.2 °F (37.9 °C)] 99.8 °F (37.7 °C)  Pulse:  [] 97  Resp:  [21-32] 24  SpO2:  [91 %-100 %] 98 %  BP: ()/(59-83) 128/83     Weight: 109.3 kg (241 lb)  Body mass index is 33.61 kg/m².    Intake/Output Summary (Last 24 hours) at 4/9/2019 1105  Last data filed at 4/9/2019 0313  Gross per 24 hour   Intake 2270 ml   Output 1415 ml   Net 855 ml      Physical Exam   Constitutional: He appears well-developed and well-nourished.   HENT:   Head: Normocephalic and atraumatic.   Cardiovascular: Normal rate and regular rhythm.   Pulmonary/Chest: Effort normal and breath sounds normal. No stridor. No respiratory distress. He has no wheezes.   Neurological: He is alert.   Nursing note and vitals reviewed.      Significant Labs:   BMP:   Recent Labs   Lab 04/09/19  0315   *   *   K 3.7      CO2 23   BUN 22*   CREATININE 0.9   CALCIUM 8.9   MG 1.8     CBC:   Recent Labs   Lab  04/08/19  0245 04/09/19  0315   WBC 13.40* 10.63   HGB 14.3 12.1*   HCT 42.8 37.3*   * 399*       Significant Imaging:     Assessment/Plan:      * NSTEMI (non-ST elevated myocardial infarction)  Patient with chest pain for 1 brief episode 1 week ago followed by progressive shortness of breath  Patient definitely has a NSTEMI with a troponin of 6.610  EKG concerning for possible STEMI but this was reviewed by Cardiology  Patient treated with full-dose Lovenox for anticoagulation  He has been loaded with aspirin and Plavix, statin not given given abnormal liver function tests  Blood pressure in the normal range currently  Echo pending  Will continue trend markers and monitor closely in the ICU  Cardiology consulted  [facility] on 4/9/19     Acute congestive heart failure  Patient with likely new onset CHF given presentation and high BNP  Echo was pending- EF 30% with diastolic dysf.   Diuresis and progress with IV Lasix    Acute pulmonary edema  As discussed above    Acute respiratory failure with hypoxia and hypercapnia  Secondary to pulmonary edema with likely new onset CHF  Chest x-ray on 4/4 with edema as well  Unlikely to be pneumonia, but concerning for possible progression to ARDS  Continue aggressive diuresis with IV Lasix Q 12  Monitor daily weights and strict I/Os  Echo pending  Will check a procalcitonin level  Pulmonary is been consulted    Hypertensive emergency  Patient does not have history of hypertension  Presented with blood pressure of 215/112 with a pulse of 119  Patient was given IV antihypertensive medications with response in blood pressure  Nitroglycerin drip was ordered but never initiated  Blood pressure normal range at 110-120 systolic  Likely induced by volume overload state with good response with diuresis    Abnormal LFTs  Patient with abnormal liver function tests ongoing for some time  Will check hepatitis panel and continue monitor with repeat labs    Hyperlipidemia  Will check a lipid  panel  Patient with abnormal liver function status which may preclude statin therapy    Diabetes mellitus type II, uncontrolled  Hold outpatient regimen for now  Monitor with Accu-Cheks and give sliding scale insulin as necessary  Glucose goal of 140-180 during hospitalization  Will check a hemoglobin A1c    Tobacco use disorder  Patient quit smoking 3 days ago  Smoking cessation counseling done and continued encouragement given for > 3 min      VTE Risk Mitigation (From admission, onward)        Ordered     IP VTE HIGH RISK PATIENT  Once      04/08/19 0519     Place NANCY hose  Until discontinued      04/08/19 0519     Place sequential compression device  Until discontinued      04/08/19 0519        To [facility] today.  Monitor in ICU today. Floor on 4/9.  Stent placed to LAD.        Nguyễn Michel MD  Department of Hospital Medicine   [facility]

## 2019-04-09 NOTE — PLAN OF CARE
Problem: Fall Injury Risk  Goal: Absence of Fall and Fall-Related Injury  Outcome: Ongoing (interventions implemented as appropriate)  Call light and frequently used items placed within reach.   Pt understand to call for assistance before getting out of bed.    Problem: Adult Inpatient Plan of Care  Goal: Plan of Care Review  Outcome: Ongoing (interventions implemented as appropriate)  A/Ox4, VSS, afebrile, resting comfortably off and on throughout the shift with family at bedside earlier in shift.  On continuous O2 sat and cardiac monitoring.  Telemetry reading NSR without ectopy noted.  On Bi-PAP 15/10 with FiO2 at 30%.  Tolerated being on 4L NC for about four hours before having to be placed back on Bi-PAP due to increase work in breathing.   Denies c/o pain.  IV intact - infusing IVF at KVO.   On IV Abx therapy - Vancomycin, Zosyn, and Azithromycin.   Tolerating cardiac diet.   Coleman catheter with cloudy yellow urine with sediment noted.   No BM this shift.  No skin breakdown.  Blood glucose taken at bedtime and was 201 - started on Levemir for basal insulin.  Able to move independently.   Will continue to monitor.    Problem: Diabetes Comorbidity  Goal: Blood Glucose Level Within Desired Range  Outcome: Ongoing (interventions implemented as appropriate)  Blood glucose checks schedule AC + HS.   Started this shift on Levemir 10 units for basal insulin and sliding scale per orders.

## 2019-04-09 NOTE — BRIEF OP NOTE
[facility]  Brief Operative Note    SUMMARY     Surgery Date: 4/9/2019     Surgeon(s) and Role:     * Adrian Jacobo MD - Primary    Assisting Surgeon: None    Pre-op Diagnosis:  NSTEMI (non-ST elevated myocardial infarction) [I21.4]Acute combined systolic and diastolic congestive heart failure [I50.41]    Post-op Diagnosis:  Post-Op Diagnosis Codes:     * NSTEMI (non-ST elevated myocardial infarction) [I21.4]     * Acute combined systolic and diastolic congestive heart failure [I50.41]    Procedure(s) (LRB):  CATHETERIZATION, HEART, BOTH LEFT AND RIGHT (Bilateral)  Percutaneous coronary intervention (N/A)    Anesthesia: RN IV Sedation    Description of Procedure:  Right and left heart catheterization with selective coronary angiography, PCI of the LAD with drug-eluting stent, IVUS of the LAD    Description of the findings of the procedure:  Proximal 80% eccentric heavily thrombotic lesion in the LAD otherwise luminal regularities.  80% lesion initially attempted treatment with aspiration thrombectomy catheter.  Initially angioplasty was performed using a 3.0 balloon.  We then placed a 4.0 x 26 mm resolute jolynn drug-eluting stent.  There appeared to be some mild residual with thrombus at the proximal and distal portions of the vessel.  We performed IVUS to interrogate.  This showed nonobstructive disease proximally and distally to the stent.  There was good expansion of the stent and the reference vessel is proximally 4.5 mm.  There was IVY 3 flow through the vessel and no additional stents were placed.  Aggrastat and Angiomax were used simultaneously for anticoagulation during the procedure.  An Angio-Seal was placed at the arterial access site and manual pressure held at the venous access site.  Additional IV Lasix was given postprocedure.    * patient had catheter-induced VT that required defibrillation upon entering the left ventricle.  Otherwise there were no complications and patient was  hemodynamically stable and symptom free on upon transfer back to the ICU.      PCWP -32 mm Hg  PA SP-56 mm Hg  No PA sat was obtained as patient required BiPAP through the procedure    Recommendation:  -continue medical therapy  -continue IV diuresis times 48 hr at least  -Aggrastat times 12 hr  -BiPAP support per Pulmonary  -continue optimize medicines for secondary prevention/GDMT  -will need life vest     Estimated Blood Loss:  Less than 50 cc         Specimens:   Specimen (12h ago, onward)    None

## 2019-04-09 NOTE — ASSESSMENT & PLAN NOTE
Patient with chest pain for 1 brief episode 1 week ago followed by progressive shortness of breath  Patient definitely has a NSTEMI with a troponin of 6.610  EKG concerning for possible STEMI but this was reviewed by Cardiology  Patient treated with full-dose Lovenox for anticoagulation  He has been loaded with aspirin and Plavix, statin not given given abnormal liver function tests  Blood pressure in the normal range currently  Echo pending  Will continue trend markers and monitor closely in the ICU  Cardiology consulted  [facility] on 4/9/19

## 2019-04-09 NOTE — PLAN OF CARE
Problem: Adult Inpatient Plan of Care  Goal: Plan of Care Review  Outcome: Ongoing (interventions implemented as appropriate)  Pt remains in ICU on BiPAP , VSS, afebrile, denied any chest pain through shift, plan of care reviewed with pt and spouse, verbalized understanding, no acute distress noted at present time, will continue to monitor.

## 2019-04-09 NOTE — PROGRESS NOTES
[facility]  Pulmonology  Progress Note    Patient Name: Kristopher Kevin  MRN: [id number]  Admission Date: 4/8/2019  Hospital Length of Stay: 1 days  Code Status: Full Code  Attending Provider: Nguyễn Moise MD  Primary Care Provider: Jhoan Lundy MD   Principal Problem: NSTEMI (non-ST elevated myocardial infarction)    Subjective:     Interval History: alternating Bipap with NC 3-4 L, tolerating well. Overall net positive since yesterday and not a robust response to lasix. Low grade fever yesterday, awaiting sputum/viral panel collection.     Objective:     Vital Signs (Most Recent):  Temp: 98.8 °F (37.1 °C) (04/09/19 0305)  Pulse: 95 (04/09/19 0800)  Resp: (!) 26 (04/09/19 0800)  BP: 122/83 (04/09/19 0800)  SpO2: 97 % (04/09/19 0800) Vital Signs (24h Range):  Temp:  [98.4 °F (36.9 °C)-100.2 °F (37.9 °C)] 98.8 °F (37.1 °C)  Pulse:  [82-98] 95  Resp:  [21-32] 26  SpO2:  [91 %-100 %] 97 %  BP: ()/(59-83) 122/83     Weight: 109.3 kg (241 lb)  Body mass index is 33.61 kg/m².      Intake/Output Summary (Last 24 hours) at 4/9/2019 0905  Last data filed at 4/9/2019 0313  Gross per 24 hour   Intake 2510 ml   Output 1515 ml   Net 995 ml       Physical Exam   Constitutional: He is oriented to person, place, and time. He appears well-developed. He has a sickly appearance. He appears ill. No distress.   HENT:   Head: Normocephalic and atraumatic.   Eyes: Conjunctivae and EOM are normal. Right eye exhibits no discharge. Left eye exhibits no discharge. No scleral icterus.   Neck: Normal range of motion. Neck supple. No tracheal deviation present. No thyromegaly present.   Cardiovascular: Normal rate, regular rhythm, S1 normal and S2 normal.   No murmur heard.  Pulses:       Radial pulses are 2+ on the right side, and 2+ on the left side.        Dorsalis pedis pulses are 2+ on the right side, and 2+ on the left side.   Trace LE edema bilaterally. Warm extremities   Pulmonary/Chest: No accessory  muscle usage. Tachypnea noted. No respiratory distress. He has no decreased breath sounds. He has no wheezes. He has rales in the right upper field, the right middle field and the left middle field.   Abdominal: Soft. Bowel sounds are normal. He exhibits distension. There is no tenderness. There is no guarding.   Lymphadenopathy:     He has no cervical adenopathy.   Neurological: He is alert and oriented to person, place, and time.   Conversant, moving all extremities spontaneously    Skin: Skin is warm and dry. He is not diaphoretic. No pallor.       Vents:  Oxygen Concentration (%): 30 (04/09/19 0800)    Lines/Drains/Airways     Drain                 Urethral Catheter 04/08/19 0630 Non-latex;Straight-tip 16 Fr. 1 day          Peripheral Intravenous Line                 Peripheral IV - Single Lumen 04/08/19 0251 Left Hand 1 day         Peripheral IV - Single Lumen 04/08/19 0251 Right Antecubital 1 day                Significant Labs:    CBC/Anemia Profile:  Recent Labs   Lab 04/08/19  0245 04/09/19  0315   WBC 13.40* 10.63   HGB 14.3 12.1*   HCT 42.8 37.3*   * 399*   MCV 87 87   RDW 12.6 12.7        Chemistries:  Recent Labs   Lab 04/08/19  0245 04/09/19  0315    133*   K 4.0 3.7    100   CO2 21* 23   BUN 19 22*   CREATININE 1.0 0.9   CALCIUM 10.0 8.9   ALBUMIN 3.3* 2.3*   PROT 8.1 6.3   BILITOT 1.1* 1.4*   ALKPHOS 89 64   ALT 59* 39   AST 57* 19   MG  --  1.8   PHOS  --  3.4       Significant Imaging:  I have reviewed all pertinent imaging results/findings within the past 24 hours.    Assessment/Plan:     * NSTEMI (non-ST elevated myocardial infarction)  --cardiology following. Likely LHC today  --continue ASA, plavix  --troponins downtrending    Acute respiratory failure with hypoxia and hypercapnia  --cardiogenic pulmonary edema contributing given NSTEMI, acute on chronic HFrEF presentation, however with previous infectious symptoms, low grade fever last night, and lack of robust response to  diuresis, suspect that he also has a component of pneumonia.  --recommend continuing azithromycin, zosyn, and vancomycin empirically for now. Respiratory viral panel and sputum culture pending. Would narrow Abx based on culture results and plan for a 7 day course of treatment for CAP  --continue alternating Bipap 15/8, 30% with NC 2 L as needed. Suspect improvement with time and treatment of CAP and pulmonary edema        Acute pulmonary edema  --as above.  --recommend fluid restriction for acute on chronic HFrEF     Abnormal LFTs  --acute hepatitis panel negative. Suspect either congestive hepatopathy vs GARZA        Patient seen and examined with Dr. Crandall. Thank you for the consult. Will sign off at this time. Please re-consult as needed.       I spent >35 minutes reviewing patient records, examining, and counseling the patient with greater than 50% of the time spent with direct patient care and coordination.          Mylene Camacho, YOHAN  Pulmonology  [facility] [place]

## 2019-04-09 NOTE — SUBJECTIVE & OBJECTIVE
Interval History:  Feels better this a.m. post diuresis and denies any shortness of breath.  He denies any chest pain.    Telemetry:  Normal sinus rhythm    Review of Systems   All other systems reviewed and are negative.    Objective:     Vital Signs (Most Recent):  Temp: 98.8 °F (37.1 °C) (04/09/19 0305)  Pulse: 95 (04/09/19 0800)  Resp: (!) 26 (04/09/19 0800)  BP: 122/83 (04/09/19 0800)  SpO2: 97 % (04/09/19 0800) Vital Signs (24h Range):  Temp:  [98.4 °F (36.9 °C)-100.2 °F (37.9 °C)] 98.8 °F (37.1 °C)  Pulse:  [82-98] 95  Resp:  [21-32] 26  SpO2:  [91 %-100 %] 97 %  BP: ()/(59-83) 122/83     Weight: 109.3 kg (241 lb)  Body mass index is 33.61 kg/m².     SpO2: 97 %  O2 Device (Oxygen Therapy): BiPAP      Intake/Output Summary (Last 24 hours) at 4/9/2019 0931  Last data filed at 4/9/2019 0313  Gross per 24 hour   Intake 2510 ml   Output 1515 ml   Net 995 ml       Lines/Drains/Airways     Drain                 Urethral Catheter 04/08/19 0630 Non-latex;Straight-tip 16 Fr. 1 day          Peripheral Intravenous Line                 Peripheral IV - Single Lumen 04/08/19 0251 Left Hand 1 day         Peripheral IV - Single Lumen 04/08/19 0251 Right Antecubital 1 day                Physical Exam   Constitutional: He is oriented to person, place, and time. He appears well-developed and well-nourished. No distress.   On BiPAP   HENT:   Head: Normocephalic and atraumatic.   Eyes: Pupils are equal, round, and reactive to light. Conjunctivae and EOM are normal.   Neck: Normal range of motion. Neck supple. No thyromegaly present.   Cardiovascular: Normal rate, regular rhythm and normal heart sounds.   No murmur heard.  Pulmonary/Chest: Effort normal and breath sounds normal. No respiratory distress. He has no wheezes. He has no rales. He exhibits no tenderness.   Abdominal: Soft. Bowel sounds are normal.   Musculoskeletal: He exhibits no edema.   Neurological: He is alert and oriented to person, place, and time.   Skin:  Skin is warm and dry.   Psychiatric: He has a normal mood and affect. His behavior is normal.       Significant Labs:   CMP   Recent Labs   Lab 04/08/19  0245 04/09/19  0315    133*   K 4.0 3.7    100   CO2 21* 23   * 166*   BUN 19 22*   CREATININE 1.0 0.9   CALCIUM 10.0 8.9   PROT 8.1 6.3   ALBUMIN 3.3* 2.3*   BILITOT 1.1* 1.4*   ALKPHOS 89 64   AST 57* 19   ALT 59* 39   ANIONGAP 14 10   ESTGFRAFRICA >60 >60   EGFRNONAA >60 >60   , CBC   Recent Labs   Lab 04/08/19  0245 04/09/19 0315   WBC 13.40* 10.63   HGB 14.3 12.1*   HCT 42.8 37.3*   * 399*   , INR   Recent Labs   Lab 04/08/19 0245   INR 1.0   , Lipid Panel   Recent Labs   Lab 04/08/19 0841   CHOL 186   HDL 22*   LDLCALC 140.8   TRIG 116   CHOLHDL 11.8*    and Troponin   Recent Labs   Lab 04/08/19 0245 04/08/19  0841 04/09/19  0315   TROPONINI 6.610* 4.995* 4.694*       Significant Imaging: Echocardiogram:   Transthoracic echo (TTE) complete (Cupid Only):   Results for orders placed or performed during the hospital encounter of 04/08/19   Transthoracic echo (TTE) 2D with Color Flow   Result Value Ref Range    BSA 2.34 m2    TDI SEPTAL 0.04     LV LATERAL E/E' RATIO 13.38     LV SEPTAL E/E' RATIO 26.75     LA WIDTH 4.34 cm    AORTIC VALVE CUSP SEPERATION 2.58 cm    TDI LATERAL 0.08     PV PEAK VELOCITY 1.09 cm/s    LVIDD 4.70 3.5 - 6.0 cm    IVS 1.51 (A) 0.6 - 1.1 cm    PW 1.45 (A) 0.6 - 1.1 cm    Ao root annulus 3.58 cm    LVIDS 3.98 2.1 - 4.0 cm    FS 15 28 - 44 %    LA volume 59.68 cm3    Sinus 3.23 cm    STJ 2.78 cm    Ascending aorta 3.04 cm    LV mass 288.17 g    LA size 2.94 cm    RVDD 4.27 cm    TAPSE 2.00 cm    RV S' 16.79 m/s    Left Ventricle Relative Wall Thickness 0.62 cm    AV mean gradient 4.42 mmHg    AV valve area 2.99 cm2    AV Velocity Ratio 0.98     AV index (prosthetic) 0.92     E/A ratio 2.02     Mean e' 0.06     E wave decelartion time 151.03 msec    IVRT 0.10 msec    Pulm vein S/D ratio 0.69     LVOT diameter  2.03 cm    LVOT area 3.23 cm2    LVOT peak shree [id number] m/s    LVOT peak VTI 22.12 cm    Ao peak shree 1.23 m/s    Ao VTI 23.93 cm    LVOT stroke volume 71.56 cm3    AV peak gradient 6.05 mmHg    E/E' ratio 17.83     MV Peak E Shree 1.07 m/s    TR Max Shree 3.06 m/s    MV Peak A Shree 0.53 m/s    PV Peak S Shree 0.44 m/s    PV Peak D Shree 0.64 m/s    LV Systolic Volume 69.32 mL    LV Systolic Volume Index 30.4 mL/m2    LV Diastolic Volume 102.41 mL    LV Diastolic Volume Index 44.86 mL/m2    LA Volume Index 26.1 mL/m2    LV Mass Index 126.2 g/m2    RA Major Axis 4.58 cm    Left Atrium Minor Axis 5.39 cm    Left Atrium Major Axis 5.62 cm    Triscuspid Valve Regurgitation Peak Gradient 37.45 mmHg    RA Width 4.30 cm    Right Atrial Pressure (from IVC) 3 mmHg    TV rest pulmonary artery pressure 40 mmHg     · Moderately decreased left ventricular systolic function. The estimated ejection fraction is 30%  · Concentric left ventricular hypertrophy.  · Grade III (severe) left ventricular diastolic dysfunction consistent with restrictive physiology.  · Elevated left atrial pressure.  · Mildly to moderately reduced right ventricular systolic function.  · The estimated PA systolic pressure is 40 mm Hg     * all labs reviewed an echocardiogram personally interpreted

## 2019-04-09 NOTE — ASSESSMENT & PLAN NOTE
ST segment changes with possible hypertensive cardiomyopathy versus late presenting MI  Troponins trended down  Plan for bilateral heart catheterization today  Loaded on anti-platelet therapy    **Risks/benefits of the procedure were d/w the patient including bleeding, infection, death, mi, arrhythmia, kidney failure, stroke, etc.  Patient understands and consent was placed on the chart.

## 2019-04-09 NOTE — PROGRESS NOTES
[facility]  Cardiology  Progress Note    Patient Name: Kristopher Kevin  MRN: [id number]  Admission Date: 4/8/2019  Hospital Length of Stay: 1 days  Code Status: Full Code   Attending Physician: Nguyễn Moise MD   Primary Care Physician: Jhoan Lundy MD  Expected Discharge Date:   Principal Problem:NSTEMI (non-ST elevated myocardial infarction)    Subjective:     Hospital Course:   4-8:  Admitted with hypertensive emergency    Interval History:  Feels better this a.m. post diuresis and denies any shortness of breath.  He denies any chest pain.    Telemetry:  Normal sinus rhythm    Review of Systems   All other systems reviewed and are negative.    Objective:     Vital Signs (Most Recent):  Temp: 98.8 °F (37.1 °C) (04/09/19 0305)  Pulse: 95 (04/09/19 0800)  Resp: (!) 26 (04/09/19 0800)  BP: 122/83 (04/09/19 0800)  SpO2: 97 % (04/09/19 0800) Vital Signs (24h Range):  Temp:  [98.4 °F (36.9 °C)-100.2 °F (37.9 °C)] 98.8 °F (37.1 °C)  Pulse:  [82-98] 95  Resp:  [21-32] 26  SpO2:  [91 %-100 %] 97 %  BP: ()/(59-83) 122/83     Weight: 109.3 kg (241 lb)  Body mass index is 33.61 kg/m².     SpO2: 97 %  O2 Device (Oxygen Therapy): BiPAP      Intake/Output Summary (Last 24 hours) at 4/9/2019 0931  Last data filed at 4/9/2019 0313  Gross per 24 hour   Intake 2510 ml   Output 1515 ml   Net 995 ml       Lines/Drains/Airways     Drain                 Urethral Catheter 04/08/19 0630 Non-latex;Straight-tip 16 Fr. 1 day          Peripheral Intravenous Line                 Peripheral IV - Single Lumen 04/08/19 0251 Left Hand 1 day         Peripheral IV - Single Lumen 04/08/19 0251 Right Antecubital 1 day                Physical Exam   Constitutional: He is oriented to person, place, and time. He appears well-developed and well-nourished. No distress.   On BiPAP   HENT:   Head: Normocephalic and atraumatic.   Eyes: Pupils are equal, round, and reactive to light. Conjunctivae and EOM are normal.   Neck:  Normal range of motion. Neck supple. No thyromegaly present.   Cardiovascular: Normal rate, regular rhythm and normal heart sounds.   No murmur heard.  Pulmonary/Chest: Effort normal and breath sounds normal. No respiratory distress. He has no wheezes. He has no rales. He exhibits no tenderness.   Abdominal: Soft. Bowel sounds are normal.   Musculoskeletal: He exhibits no edema.   Neurological: He is alert and oriented to person, place, and time.   Skin: Skin is warm and dry.   Psychiatric: He has a normal mood and affect. His behavior is normal.       Significant Labs:   CMP   Recent Labs   Lab 04/08/19 0245 04/09/19  0315    133*   K 4.0 3.7    100   CO2 21* 23   * 166*   BUN 19 22*   CREATININE 1.0 0.9   CALCIUM 10.0 8.9   PROT 8.1 6.3   ALBUMIN 3.3* 2.3*   BILITOT 1.1* 1.4*   ALKPHOS 89 64   AST 57* 19   ALT 59* 39   ANIONGAP 14 10   ESTGFRAFRICA >60 >60   EGFRNONAA >60 >60   , CBC   Recent Labs   Lab 04/08/19 0245 04/09/19  0315   WBC 13.40* 10.63   HGB 14.3 12.1*   HCT 42.8 37.3*   * 399*   , INR   Recent Labs   Lab 04/08/19 0245   INR 1.0   , Lipid Panel   Recent Labs   Lab 04/08/19  0841   CHOL 186   HDL 22*   LDLCALC 140.8   TRIG 116   CHOLHDL 11.8*    and Troponin   Recent Labs   Lab 04/08/19 0245 04/08/19  0841 04/09/19  0315   TROPONINI 6.610* 4.995* 4.694*       Significant Imaging: Echocardiogram:   Transthoracic echo (TTE) complete (Cupid Only):   Results for orders placed or performed during the hospital encounter of 04/08/19   Transthoracic echo (TTE) 2D with Color Flow   Result Value Ref Range    BSA 2.34 m2    TDI SEPTAL 0.04     LV LATERAL E/E' RATIO 13.38     LV SEPTAL E/E' RATIO 26.75     LA WIDTH 4.34 cm    AORTIC VALVE CUSP SEPERATION 2.58 cm    TDI LATERAL 0.08     PV PEAK VELOCITY 1.09 cm/s    LVIDD 4.70 3.5 - 6.0 cm    IVS 1.51 (A) 0.6 - 1.1 cm    PW 1.45 (A) 0.6 - 1.1 cm    Ao root annulus 3.58 cm    LVIDS 3.98 2.1 - 4.0 cm    FS 15 28 - 44 %    LA volume  59.68 cm3    Sinus 3.23 cm    STJ 2.78 cm    Ascending aorta 3.04 cm    LV mass 288.17 g    LA size 2.94 cm    RVDD 4.27 cm    TAPSE 2.00 cm    RV S' 16.79 m/s    Left Ventricle Relative Wall Thickness 0.62 cm    AV mean gradient 4.42 mmHg    AV valve area 2.99 cm2    AV Velocity Ratio 0.98     AV index (prosthetic) 0.92     E/A ratio 2.02     Mean e' 0.06     E wave decelartion time 151.03 msec    IVRT 0.10 msec    Pulm vein S/D ratio 0.69     LVOT diameter 2.03 cm    LVOT area 3.23 cm2    LVOT peak shree [id number] m/s    LVOT peak VTI 22.12 cm    Ao peak shree 1.23 m/s    Ao VTI 23.93 cm    LVOT stroke volume 71.56 cm3    AV peak gradient 6.05 mmHg    E/E' ratio 17.83     MV Peak E Shree 1.07 m/s    TR Max Shree 3.06 m/s    MV Peak A Shree 0.53 m/s    PV Peak S Shree 0.44 m/s    PV Peak D Shree 0.64 m/s    LV Systolic Volume 69.32 mL    LV Systolic Volume Index 30.4 mL/m2    LV Diastolic Volume 102.41 mL    LV Diastolic Volume Index 44.86 mL/m2    LA Volume Index 26.1 mL/m2    LV Mass Index 126.2 g/m2    RA Major Axis 4.58 cm    Left Atrium Minor Axis 5.39 cm    Left Atrium Major Axis 5.62 cm    Triscuspid Valve Regurgitation Peak Gradient 37.45 mmHg    RA Width 4.30 cm    Right Atrial Pressure (from IVC) 3 mmHg    TV rest pulmonary artery pressure 40 mmHg     · Moderately decreased left ventricular systolic function. The estimated ejection fraction is 30%  · Concentric left ventricular hypertrophy.  · Grade III (severe) left ventricular diastolic dysfunction consistent with restrictive physiology.  · Elevated left atrial pressure.  · Mildly to moderately reduced right ventricular systolic function.  · The estimated PA systolic pressure is 40 mm Hg     * all labs reviewed an echocardiogram personally interpreted    Assessment and Plan:     Brief HPI:  Improved post diuresis and plan is for bilateral heart catheterization today    * NSTEMI (non-ST elevated myocardial infarction)  ST segment changes with possible hypertensive  cardiomyopathy versus late presenting MI  Troponins trended down  Plan for bilateral heart catheterization today  Loaded on anti-platelet therapy    **Risks/benefits of the procedure were d/w the patient including bleeding, infection, death, mi, arrhythmia, kidney failure, stroke, etc.  Patient understands and consent was placed on the chart.    Hypertensive emergency  Stable after addition oral meds      Acute congestive heart failure  Acute on chronic Combined systolic and diastolic  Improved post diuresis    Acute respiratory failure with hypoxia and hypercapnia  Secondary to pulmonary edema  Currently stable on BiPAP  Pulmonary following    Diabetes mellitus type II, uncontrolled  Per IM    Tobacco use disorder  Counseled    Hyperlipidemia  Initiate statin  Follow LFTs    Obesity, unspecified  Diet and exercise    Abnormal LFTs  Currently stable and likely congestive  Received amiodarone in the ED  Currently will do half dose statin and continue follow closely        VTE Risk Mitigation (From admission, onward)        Ordered     IP VTE HIGH RISK PATIENT  Once      04/08/19 0519     Place NANCY hose  Until discontinued      04/08/19 0519     Place sequential compression device  Until discontinued      04/08/19 0519          Adrian Jacobo MD  Cardiology  [facility] [place]

## 2019-04-09 NOTE — HOSPITAL COURSE
53-year-old male with HLP, diabetes type 2, abnormal liver function tests, irregular heartbeat, tobacco abuse (quit 3 days ago) who presented 4/8/19 with worsening shortness of breath x 1 week.  The patient was found to have a non-stemi with a trop > 6 on presentation.  Cards was consulted and the patient went to the ICU with Bi-pap support on 4/8 for hypercapnic resp. Failure. Cards took patient to [facility] on 4/9/19 and placed stent to LAD. The patient had an episode of pulseless V-tach and required a shock.  Cards started patient back on IV amio. Patient found to have an EF of 30% this hospital stay.  Cards states will need life vest on discharge.  The patient had another episode of V-tach requiring electric shock. Cards took to cath lab and balloon pump was initiated.  Cards attempted to [place] [facility] [place] 4[place]10/19 however, no beds were available. Patient awaiting transfer.

## 2019-04-09 NOTE — SUBJECTIVE & OBJECTIVE
Interval History: alternating Bipap with NC 3-4 L, tolerating well. Overall net positive since yesterday and not a robust response to lasix. Low grade fever yesterday, awaiting sputum/viral panel collection.     Objective:     Vital Signs (Most Recent):  Temp: 98.8 °F (37.1 °C) (04/09/19 0305)  Pulse: 95 (04/09/19 0800)  Resp: (!) 26 (04/09/19 0800)  BP: 122/83 (04/09/19 0800)  SpO2: 97 % (04/09/19 0800) Vital Signs (24h Range):  Temp:  [98.4 °F (36.9 °C)-100.2 °F (37.9 °C)] 98.8 °F (37.1 °C)  Pulse:  [82-98] 95  Resp:  [21-32] 26  SpO2:  [91 %-100 %] 97 %  BP: ()/(59-83) 122/83     Weight: 109.3 kg (241 lb)  Body mass index is 33.61 kg/m².      Intake/Output Summary (Last 24 hours) at 4/9/2019 0905  Last data filed at 4/9/2019 0313  Gross per 24 hour   Intake 2510 ml   Output 1515 ml   Net 995 ml       Physical Exam   Constitutional: He is oriented to person, place, and time. He appears well-developed. He has a sickly appearance. He appears ill. No distress.   HENT:   Head: Normocephalic and atraumatic.   Eyes: Conjunctivae and EOM are normal. Right eye exhibits no discharge. Left eye exhibits no discharge. No scleral icterus.   Neck: Normal range of motion. Neck supple. No tracheal deviation present. No thyromegaly present.   Cardiovascular: Normal rate, regular rhythm, S1 normal and S2 normal.   No murmur heard.  Pulses:       Radial pulses are 2+ on the right side, and 2+ on the left side.        Dorsalis pedis pulses are 2+ on the right side, and 2+ on the left side.   Trace LE edema bilaterally. Warm extremities   Pulmonary/Chest: No accessory muscle usage. Tachypnea noted. No respiratory distress. He has no decreased breath sounds. He has no wheezes. He has rales in the right upper field, the right middle field and the left middle field.   Abdominal: Soft. Bowel sounds are normal. He exhibits distension. There is no tenderness. There is no guarding.   Lymphadenopathy:     He has no cervical adenopathy.    Neurological: He is alert and oriented to person, place, and time.   Conversant, moving all extremities spontaneously    Skin: Skin is warm and dry. He is not diaphoretic. No pallor.       Vents:  Oxygen Concentration (%): 30 (04/09/19 0800)    Lines/Drains/Airways     Drain                 Urethral Catheter 04/08/19 0630 Non-latex;Straight-tip 16 Fr. 1 day          Peripheral Intravenous Line                 Peripheral IV - Single Lumen 04/08/19 0251 Left Hand 1 day         Peripheral IV - Single Lumen 04/08/19 0251 Right Antecubital 1 day                Significant Labs:    CBC/Anemia Profile:  Recent Labs   Lab 04/08/19  0245 04/09/19  0315   WBC 13.40* 10.63   HGB 14.3 12.1*   HCT 42.8 37.3*   * 399*   MCV 87 87   RDW 12.6 12.7        Chemistries:  Recent Labs   Lab 04/08/19  0245 04/09/19  0315    133*   K 4.0 3.7    100   CO2 21* 23   BUN 19 22*   CREATININE 1.0 0.9   CALCIUM 10.0 8.9   ALBUMIN 3.3* 2.3*   PROT 8.1 6.3   BILITOT 1.1* 1.4*   ALKPHOS 89 64   ALT 59* 39   AST 57* 19   MG  --  1.8   PHOS  --  3.4       Significant Imaging:  I have reviewed all pertinent imaging results/findings within the past 24 hours.

## 2019-04-09 NOTE — ASSESSMENT & PLAN NOTE
Patient with likely new onset CHF given presentation and high BNP  Echo was pending- EF 30% with diastolic dysf.   Diuresis and progress with IV Lasix

## 2019-04-09 NOTE — PLAN OF CARE
Problem: Adult Inpatient Plan of Care  Goal: Plan of Care Review  Outcome: Ongoing (interventions implemented as appropriate)  Pt remains in ICU on BiPAP /3LNC denied any SOB, R-groin dressing CDI, no hematoma no bleeding noted, good UA per reynaga, plan of care reviewed with pt and family verbalized understanding, pt remains free of injury, safety maintained no acute distress noted at present time, will continue to monitor.

## 2019-04-09 NOTE — PROGRESS NOTES
Pt received on Bipap 15/10 and 30% with NARN, will monitor pt status and wean as tolerated.Pt face and nose checked for redness or breakdown. None noted at this time.

## 2019-04-09 NOTE — SUBJECTIVE & OBJECTIVE
Interval History: No new issues     Review of Systems   Constitutional: Negative for activity change.   HENT: Negative for congestion.    Respiratory: Negative for chest tightness and shortness of breath.    Cardiovascular: Negative for chest pain.   Gastrointestinal: Negative for abdominal pain.   Genitourinary: Negative for difficulty urinating.     Objective:     Vital Signs (Most Recent):  Temp: 99.8 °F (37.7 °C) (04/09/19 0715)  Pulse: 97 (04/09/19 1000)  Resp: (!) 24 (04/09/19 1000)  BP: 128/83 (04/09/19 1000)  SpO2: 98 % (04/09/19 1000) Vital Signs (24h Range):  Temp:  [98.4 °F (36.9 °C)-100.2 °F (37.9 °C)] 99.8 °F (37.7 °C)  Pulse:  [] 97  Resp:  [21-32] 24  SpO2:  [91 %-100 %] 98 %  BP: ()/(59-83) 128/83     Weight: 109.3 kg (241 lb)  Body mass index is 33.61 kg/m².    Intake/Output Summary (Last 24 hours) at 4/9/2019 1105  Last data filed at 4/9/2019 0313  Gross per 24 hour   Intake 2270 ml   Output 1415 ml   Net 855 ml      Physical Exam   Constitutional: He appears well-developed and well-nourished.   HENT:   Head: Normocephalic and atraumatic.   Cardiovascular: Normal rate and regular rhythm.   Pulmonary/Chest: Effort normal and breath sounds normal. No stridor. No respiratory distress. He has no wheezes.   Neurological: He is alert.   Nursing note and vitals reviewed.      Significant Labs:   BMP:   Recent Labs   Lab 04/09/19  0315   *   *   K 3.7      CO2 23   BUN 22*   CREATININE 0.9   CALCIUM 8.9   MG 1.8     CBC:   Recent Labs   Lab 04/08/19  0245 04/09/19  0315   WBC 13.40* 10.63   HGB 14.3 12.1*   HCT 42.8 37.3*   * 399*       Significant Imaging:

## 2019-04-09 NOTE — ASSESSMENT & PLAN NOTE
--cardiogenic pulmonary edema contributing given NSTEMI, acute on chronic HFrEF presentation, however with previous infectious symptoms, low grade fever last night, and lack of robust response to diuresis, suspect that he also has a component of pneumonia.  --recommend continuing azithromycin, zosyn, and vancomycin empirically for now. Respiratory viral panel and sputum culture pending. Would narrow Abx based on culture results and plan for a 7 day course of treatment for CAP  --continue alternating Bipap 15/8, 30% with NC 2 L as needed. Suspect improvement with time and treatment of CAP and pulmonary edema

## 2019-04-09 NOTE — NURSING
Pt back to ICU from cath-lab , on BiPAP, Neuro, pulse and R- groin dressing intact. tirofiban infusing at 19.5 ml/hr, good UO per reynaga, pt remains flat.will continue to monitor.

## 2019-04-09 NOTE — HOSPITAL COURSE
Patient underwent repeat angiogram with PCI of LAD which he tolerated well. Remained on balloon pump. During attempts to wean patient developed further episodes of polymorphic Vtach requiring electrical cardioversion. IABP was put back on 1:1 and remained there. He was started on amiodarone with persistent episodes of Vtach.     HTS and EP consulted.     He was then started on lidocaine infusion in addition to amiodarone at 1. Persistent Vtach.     EP recommended increasing lidocaine to 2 with continued amiodarone.     Overnight 4/15-4/16 patient had several more episodes of Vtach. He was intubated for airway protection due to frequent sedation and cardioversion. Lidocaine increased to 3. Developed bradycardia to the 20s requiring epi.     Discussed with consult services. Patient was not a candidate for advanced mechanical options, continued medical management.     Over the course of the day on 4/16 patient had 8 episodes of Vtach requiring cardioversion from 06:50 to 13:00. Wife present at bedside asked for further shocks to be held and no compressions be performed if he lost a pulse.     He passed away at 01:06 after another episode of polymorphic Vtach.

## 2019-04-10 PROBLEM — I47.20 V-TACH: Status: ACTIVE | Noted: 2019-01-01

## 2019-04-10 PROBLEM — I47.20 VT (VENTRICULAR TACHYCARDIA): Status: ACTIVE | Noted: 2019-01-01

## 2019-04-10 NOTE — ASSESSMENT & PLAN NOTE
Secondary to pulmonary edema with likely new onset CHF  Chest x-ray on 4/4 with edema as well  Unlikely to be pneumonia, but concerning for possible progression to ARDS  Continue aggressive diuresis with IV Lasix Q 12  Monitor daily weights and strict I/Os  Echo pending  Will check a procalcitonin level

## 2019-04-10 NOTE — PROGRESS NOTES
0944 Discussed lasix order with Dr. Jacobo.  Do not give 80 mg dose scheduled for 0900; give only 40 mg for am dose. Per Dr. Jacobo pt needs to have K+ and Mg replaced; sent message to  re: replacing K+ & Mg. Await new orders.    1130 Mg and K+ replaced.    1348  Vtach, code called, pulse present, shocked at 1349, 3 compressions, ROSC, amiodarone bolus of 300 mg given, placed on BiPAP.  Dr Jacobo paged; pt to go for IABP and transferred to [facility].    1530  Pt returned to room, IABP not functioning properly.  Dr. Jacobo returned to room to troubleshoot IABP; cath flow instilled, IABP functioning at 1605.    1745  VSS, IABP functioning properly, SpO2 98% on BiPAP, AAOX4, NPO, voiding adequate dark yellow urine to Coleman cathter to gravity, BM x1 this shift, bed in low locked position, LLL immobilized, insertion site no s/s of hematoma or bleeding, bed in low locked position, call light within reach, family at bedside, able to make needs known; no needs at this time.

## 2019-04-10 NOTE — PLAN OF CARE
Problem: Adult Inpatient Plan of Care  Goal: Plan of Care Review  Outcome: Ongoing (interventions implemented as appropriate)  A/Ox4, VSS, afebrile, resting comfortably off and on throughout the shift.  On continuous O2 sat and cardiac monitoring.  Telemetry reading NSR with occasional PVC's.  Pt went into a pulseless V-tach at 0409 (please refer to code documentation for further details).  Earlier in shift was on 4L of oxygen via NC however after Code Blue incident placed on BiPAP with the following settings 15/10 with an FiO2 of 30%.   Denies c/o pain.  Dressing to Rt groin intact with dried serosanguinous drainage noted.   Coleman catheter with cloudy, yellow urine noted.  No skin breakdown.  No BM this shift.  Cardiology to follow up with patient this AM.  Family notified of code situation.   Family at bedside.  Will continue to monitor.    Problem: Diabetes Comorbidity  Goal: Blood Glucose Level Within Desired Range  Outcome: Ongoing (interventions implemented as appropriate)  Blood glucose monitoring AC + HS.  Pt not requiring sliding scale insulin for bedtime glucose.   On Levemir for basal insulin and Novolog for sliding scale coverage.

## 2019-04-10 NOTE — BRIEF OP NOTE
[facility]  Brief Operative Note    SUMMARY     Surgery Date: 4/10/2019     Surgeon(s) and Role:     * Adrian Jacobo MD - Primary    Assisting Surgeon: None    Pre-op Diagnosis:  Acute combined systolic and diastolic congestive heart failure [I50.41]    Post-op Diagnosis:  Post-Op Diagnosis Codes:     * Acute combined systolic and diastolic congestive heart failure [I50.41]    Procedure(s) (LRB):  INSERTION, INTRA-AORTIC BALLOON PUMP (N/A)    Anesthesia: RN IV Sedation    Description of Procedure:  IABP placement    Description of the findings of the procedure:  Successful placement via left common femoral artery fixed into place and set at 1:1.    Estimated Blood Loss:  Less than 50 cc         Specimens:   Specimen (12h ago, onward)    None

## 2019-04-10 NOTE — ASSESSMENT & PLAN NOTE
Post MI received defibrillation 4-10  On amiodarone IV  Also with electrolyte abnormalities to be corrected by primary  Will need life vest prior to discharge

## 2019-04-10 NOTE — SUBJECTIVE & OBJECTIVE
Interval History:  No complaints of chest pain shortness of breath this a.m..  Feels like he is getting a little bit better.  He did have pulseless VT requiring defibrillation and was started on amiodarone last night.    Telemetry:  Normal sinus rhythm    Review of Systems   All other systems reviewed and are negative.    Objective:     Vital Signs (Most Recent):  Temp: 96.9 °F (36.1 °C) (04/10/19 0800)  Pulse: 77 (04/10/19 0826)  Resp: (!) 23 (04/10/19 0826)  BP: 111/78 (04/10/19 0800)  SpO2: 96 % (04/10/19 0826) Vital Signs (24h Range):  Temp:  [96.9 °F (36.1 °C)-100.4 °F (38 °C)] 96.9 °F (36.1 °C)  Pulse:  [] 77  Resp:  [20-37] 23  SpO2:  [94 %-100 %] 96 %  BP: ()/(53-83) 111/78     Weight: 109.3 kg (241 lb)  Body mass index is 33.61 kg/m².     SpO2: 96 %  O2 Device (Oxygen Therapy): nasal cannula      Intake/Output Summary (Last 24 hours) at 4/10/2019 0909  Last data filed at 4/10/2019 0800  Gross per 24 hour   Intake 1039.62 ml   Output 5140 ml   Net -4100.38 ml       Lines/Drains/Airways     Drain                 Urethral Catheter 04/08/19 0630 Non-latex;Straight-tip 16 Fr. 2 days          Peripheral Intravenous Line                 Peripheral IV - Single Lumen 04/08/19 0251 Left Hand 2 days         Peripheral IV - Single Lumen 04/08/19 0251 Right Antecubital 2 days                Physical Exam   Constitutional: He is oriented to person, place, and time. He appears well-developed and well-nourished. No distress.   HENT:   Head: Normocephalic and atraumatic.   Eyes: Pupils are equal, round, and reactive to light. Conjunctivae and EOM are normal.   Neck: Normal range of motion. Neck supple. No thyromegaly present.   Cardiovascular: Normal rate, regular rhythm and normal heart sounds.   No murmur heard.  Pulmonary/Chest: Effort normal and breath sounds normal. No respiratory distress. He has no wheezes. He has no rales. He exhibits no tenderness.   Abdominal: Soft. Bowel sounds are normal.    Musculoskeletal: He exhibits no edema.   Neurological: He is alert and oriented to person, place, and time.   Skin: Skin is warm and dry.   Psychiatric: He has a normal mood and affect. His behavior is normal.       Significant Labs:   CMP   Recent Labs   Lab 04/09/19  0315 04/10/19  0238   * 135*   K 3.7 3.5    99   CO2 23 27   * 114*   BUN 22* 24*   CREATININE 0.9 1.0   CALCIUM 8.9 8.9   PROT 6.3 6.3   ALBUMIN 2.3* 2.1*   BILITOT 1.4* 1.3*   ALKPHOS 64 70   AST 19 28   ALT 39 36   ANIONGAP 10 9   ESTGFRAFRICA >60 >60   EGFRNONAA >60 >60   , CBC   Recent Labs   Lab 04/09/19  0315 04/10/19  0238   WBC 10.63 10.73   HGB 12.1* 11.5*   HCT 37.3* 35.5*   * 443*   , INR No results for input(s): INR, PROTIME in the last 48 hours., Lipid Panel No results for input(s): CHOL, HDL, LDLCALC, TRIG, CHOLHDL in the last 48 hours. and Troponin   Recent Labs   Lab 04/09/19 0315   TROPONINI 4.694*       Significant Imaging: Echocardiogram:   Transthoracic echo (TTE) complete (Cupid Only):   Results for orders placed or performed during the hospital encounter of 04/08/19   Transthoracic echo (TTE) 2D with Color Flow   Result Value Ref Range    BSA 2.34 m2    TDI SEPTAL 0.04     LV LATERAL E/E' RATIO 13.38     LV SEPTAL E/E' RATIO 26.75     LA WIDTH 4.34 cm    AORTIC VALVE CUSP SEPERATION 2.58 cm    TDI LATERAL 0.08     PV PEAK VELOCITY 1.09 cm/s    LVIDD 4.70 3.5 - 6.0 cm    IVS 1.51 (A) 0.6 - 1.1 cm    PW 1.45 (A) 0.6 - 1.1 cm    Ao root annulus 3.58 cm    LVIDS 3.98 2.1 - 4.0 cm    FS 15 28 - 44 %    LA volume 59.68 cm3    Sinus 3.23 cm    STJ 2.78 cm    Ascending aorta 3.04 cm    LV mass 288.17 g    LA size 2.94 cm    RVDD 4.27 cm    TAPSE 2.00 cm    RV S' 16.79 m/s    Left Ventricle Relative Wall Thickness 0.62 cm    AV mean gradient 4.42 mmHg    AV valve area 2.99 cm2    AV Velocity Ratio 0.98     AV index (prosthetic) 0.92     E/A ratio 2.02     Mean e' 0.06     E wave decelartion time 151.03 msec     IVRT 0.10 msec    Pulm vein S/D ratio 0.69     LVOT diameter 2.03 cm    LVOT area 3.23 cm2    LVOT peak shree [id number] m/s    LVOT peak VTI 22.12 cm    Ao peak shree 1.23 m/s    Ao VTI 23.93 cm    LVOT stroke volume 71.56 cm3    AV peak gradient 6.05 mmHg    E/E' ratio 17.83     MV Peak E Shree 1.07 m/s    TR Max Shree 3.06 m/s    MV Peak A Shree 0.53 m/s    PV Peak S Shree 0.44 m/s    PV Peak D Shree 0.64 m/s    LV Systolic Volume 69.32 mL    LV Systolic Volume Index 30.4 mL/m2    LV Diastolic Volume 102.41 mL    LV Diastolic Volume Index 44.86 mL/m2    LA Volume Index 26.1 mL/m2    LV Mass Index 126.2 g/m2    RA Major Axis 4.58 cm    Left Atrium Minor Axis 5.39 cm    Left Atrium Major Axis 5.62 cm    Triscuspid Valve Regurgitation Peak Gradient 37.45 mmHg    RA Width 4.30 cm    Right Atrial Pressure (from IVC) 3 mmHg    TV rest pulmonary artery pressure 40 mmHg   · Moderately decreased left ventricular systolic function. The estimated ejection fraction is 30%  · Concentric left ventricular hypertrophy.  · Grade III (severe) left ventricular diastolic dysfunction consistent with restrictive physiology.  · Elevated left atrial pressure.  · Mildly to moderately reduced right ventricular systolic function.  · The estimated PA systolic pressure is 40 mm Hg     R/LHC:  · Single vessel coronary artery disease.  · Successful PCI of culprit proximal LAD lesion.  · Prox LAD lesion , 80% stenosed reduced to 0%..  · A STENT RESOLUTE MARC [id number] stent was successfully placed at 12 ISABELA  · Filling pressures on the right and left are severely elevated. Pulmonary HTN is moderate.  · Post stent deployment IVUS showed adequate deployment without significant residual at the stent edges

## 2019-04-10 NOTE — SUBJECTIVE & OBJECTIVE
Interval History: No new complaints     Review of Systems   Constitutional: Negative for activity change.   HENT: Negative for congestion.    Respiratory: Negative for chest tightness and shortness of breath.    Cardiovascular: Negative for chest pain.   Gastrointestinal: Negative for abdominal pain.   Genitourinary: Negative for difficulty urinating.     Objective:     Vital Signs (Most Recent):  Temp: 96.9 °F (36.1 °C) (04/10/19 0800)  Pulse: 77 (04/10/19 0826)  Resp: (!) 23 (04/10/19 0826)  BP: 111/78 (04/10/19 0800)  SpO2: 96 % (04/10/19 0826) Vital Signs (24h Range):  Temp:  [96.9 °F (36.1 °C)-100.4 °F (38 °C)] 96.9 °F (36.1 °C)  Pulse:  [] 77  Resp:  [20-37] 23  SpO2:  [94 %-100 %] 96 %  BP: ()/(53-80) 111/78     Weight: 109.3 kg (241 lb)  Body mass index is 33.61 kg/m².    Intake/Output Summary (Last 24 hours) at 4/10/2019 1103  Last data filed at 4/10/2019 0800  Gross per 24 hour   Intake 1039.62 ml   Output 3815 ml   Net -2775.38 ml      Physical Exam   Constitutional: He is oriented to person, place, and time. He appears well-developed and well-nourished.   HENT:   Head: Normocephalic and atraumatic.   Cardiovascular: Normal rate and regular rhythm.   Pulmonary/Chest: Effort normal. He has no wheezes. He has no rales.   Neurological: He is alert and oriented to person, place, and time.   Vitals reviewed.       Significant Labs:   CBC:   Recent Labs   Lab 04/09/19  0315 04/10/19  0238   WBC 10.63 10.73   HGB 12.1* 11.5*   HCT 37.3* 35.5*   * 443*     CMP:   Recent Labs   Lab 04/09/19  0315 04/10/19  0238   * 135*   K 3.7 3.5    99   CO2 23 27   * 114*   BUN 22* 24*   CREATININE 0.9 1.0   CALCIUM 8.9 8.9   PROT 6.3 6.3   ALBUMIN 2.3* 2.1*   BILITOT 1.4* 1.3*   ALKPHOS 64 70   AST 19 28   ALT 39 36   ANIONGAP 10 9   EGFRNONAA >60 >60       Significant Imaging:

## 2019-04-10 NOTE — SIGNIFICANT EVENT
Code blue called overhead room 261. I responded to code. By the time I arrived, patient was awake and alert and responding appropriately. According to nursing, patient had VTach (telemetry strip resolved), shocked 150J, 300mg amio given, and 3 chest compressions then had ROSC. Patient had a thready pulse throughout. Notified Dr Jacobo. BP ok. SpO2 dropping while on nonrebreather-- placed on BiPAP 15/10 40% FiO2. Diaphoretic, lungs clear, heart regular, peripheral pulses intact, no leg edema, abdomen benign, swelling noted to R groin cath site. Labs from AM reviewed- he was hypoK and hypoMg but this was replaced. STAT repeat CBC, BMP, Mg, Phos, and CXR ordered. Dr Jacobo said plan was IABP and transfer to [facility]. Said defer vascular US of groin for now. Discussed plan with patient. He understands. Nursing talked to family. Will continue to monitor closely.    Maria R Mccullough MD  04/10/2019 2:16 PM

## 2019-04-10 NOTE — ASSESSMENT & PLAN NOTE
Patient with abnormal liver function tests ongoing for some time  Will check hepatitis panel and continue monitor with repeat labs  LFT's returned to normal

## 2019-04-10 NOTE — CARE UPDATE
Patient had recurrent VT/VF arrest successfully resuscitated again despite amiodarone infusion.  Discussed with family in detail, will place emergently IABP.  If any brief recurrence will also add lidocaine but have artery placed transfer to [facility] for higher level of care.  There is no change in right groin which previously had signs of hernia for which we went around during previous catheterization  family is in agreement with the plan.  Discussed with primary doctor Surjit.  Also preliminarily discussed with Pulmonary regarding elective intubation if transfer needed due to respiratory failure.    **Risks/benefits of the procedure were d/w the patient including bleeding, infection, death, mi, arrhythmia, kidney failure, stroke, etc.  Patient understands and consent was placed on the chart.

## 2019-04-10 NOTE — PLAN OF CARE
Problem: Adult Inpatient Plan of Care  Goal: Plan of Care Review  Outcome: Ongoing (interventions implemented as appropriate)  Bipap worn overnight with charted settings.

## 2019-04-10 NOTE — ASSESSMENT & PLAN NOTE
Hold outpatient regimen for now  Monitor with Accu-Cheks and give sliding scale insulin as necessary  Glucose goal of 140-180 during hospitalization  Will check a hemoglobin A1c- 9.1

## 2019-04-10 NOTE — PROGRESS NOTES
Pt was transported to cath lab on BiPAP 15/10@40% sats 97%. Pt tolerated transport to and from cath lab with no problems. Pt is now back in ICU room 261. Pt still remains on BiPAP sats 98%

## 2019-04-10 NOTE — ASSESSMENT & PLAN NOTE
Patient with chest pain for 1 brief episode 1 week ago followed by progressive shortness of breath  Patient definitely has a NSTEMI with a troponin of 6.610  EKG concerning for possible STEMI but this was reviewed by Cardiology  Patient treated with full-dose Lovenox for anticoagulation  He has been loaded with aspirin and Plavix, statin not given given abnormal liver function tests  Blood pressure in the normal range currently  Echo pending  Will continue trend markers and monitor closely in the ICU  Cardiology consulted  [facility] on 4/9/19- stent to LAD

## 2019-04-10 NOTE — CARE UPDATE
Cross-Cover Progress Note    A Code Blue was called to Mr. Kristopher Kevin's room tonight due to pulseless ventricular tachycardia.  The patient was admitted for NSTEMI and underwent bilateral heart catheterization yesterday with IVUS of the LAD and SHIRA PCI to the LAD.  He apparently had a similar episode after the heart catheterization for which she had to be defibrillated.    He was noted to be in ventricular tachycardia on monitor and upon evaluation by nursing staff was found to be unresponsive.  Chest compressions were immediately initiated and on pulse check pause was prompted by the AED to deliver a defibrillatory shock for ventricular tachycardia.  He immediately regained consciousness and was back in normal sinus rhythm. He did not have to be endotracheally intubated nor receive any epinephrine.    He reports that he was feeling lightheaded then woke up to staff members attending to him.  He was diaphoretic but otherwise with normal heart and breath sounds.    ED physician, Kristopher Chaney, arrived on scene to render aid.    I suspect that he has post reperfusion arrhythmia--I  will start him on an amiodarone infusion after an initial bolus.  Cardiology is already on the case and will be informed tonight.    I will continue monitor the patient's progress throughout the night.          Critical Care Time: 45 minutes.          Tim Louis M.D.  Staff Nocturnist  Department of Hospital Medicine  [facility]  Pager: [phone]

## 2019-04-10 NOTE — PROGRESS NOTES
"[facility]  Progress Note    Patient Name: Kristopher Kevin  MRN: [id number]  Patient Class: IP- Inpatient   Admission Date: 4/8/2019  Length of Stay: 2 days  Attending Physician: Nguyễn Moise MD  Primary Care Provider: Jhoan Lundy MD        Subjective:     Principal Problem:NSTEMI (non-ST elevated myocardial infarction)    HPI:  53-year-old male with HLP, diabetes type 2, abnormal liver function tests, irregular heartbeat, tobacco abuse (quit 3 days ago) who presented with worsening shortness of breath x 1 week.  He reports shortness breath started 1 week ago when he "fell ill" with some sort of viral syndrome with associated nausea and vomiting, shortness of breath and chest pain which he attributed to throwing up the self-resolved.  He went to establish care with a new primary care physician and was seen on 04/04/2019 and workup with a chest x-ray was done on 04/05 consistent with pulmonary edema and Lasix was called in for the patient but this medication was never picked up.  He reports shortness of breath gradually continued to get worse, and then finding last night he could no longer lay flat given that he felt like he was being smothered with any attempt.  He reported swelling in his lower extremities; dry cough.  No fevers or chills, no recent travels, no sick contacts.  No reported chest pain since the initial episode that self-resolved 1 week ago.  In the ER, he was in respiratory distress and was placed on BiPAP.  His BNP was 576, initial troponin of 6.610, LFTs with total bili 1.1 and AST/ALT of 57/59, WBC count of 13.40, ABG with pH of 7.265 pCO2 52.1 PO2 of 99 and bicarb of 23.6 on BiPAP, chest x-ray with interval worsening of patchy bilateral mixed interstitial and alveolar opacities compared to prior examination.  Findings may relate to edema, ARDS, or multifocal infection.  EKG reviewed with multiple leads with questionable ST elevation.    Hospital " Course:  53-year-old male with HLP, diabetes type 2, abnormal liver function tests, irregular heartbeat, tobacco abuse (quit 3 days ago) who presented 4/8/19 with worsening shortness of breath x 1 week.  The patient was found to have a non-stemi with a trop > 6 on presentation.  Cards was consulted and the patient went to the ICU with Bi-pap support on 4/8 for hypercapnic resp. Failure. Cards took patient to [facility] on 4/9/19 and placed stent to LAD. The patient had an episode of pulseless V-tach and required a shock.  Cards started patient back on IV amio. Patient found to have an EF of 30% this hospital stay.  Cards states will need life vest on discharge.      Interval History: No new complaints     Review of Systems   Constitutional: Negative for activity change.   HENT: Negative for congestion.    Respiratory: Negative for chest tightness and shortness of breath.    Cardiovascular: Negative for chest pain.   Gastrointestinal: Negative for abdominal pain.   Genitourinary: Negative for difficulty urinating.     Objective:     Vital Signs (Most Recent):  Temp: 96.9 °F (36.1 °C) (04/10/19 0800)  Pulse: 77 (04/10/19 0826)  Resp: (!) 23 (04/10/19 0826)  BP: 111/78 (04/10/19 0800)  SpO2: 96 % (04/10/19 0826) Vital Signs (24h Range):  Temp:  [96.9 °F (36.1 °C)-100.4 °F (38 °C)] 96.9 °F (36.1 °C)  Pulse:  [] 77  Resp:  [20-37] 23  SpO2:  [94 %-100 %] 96 %  BP: ()/(53-80) 111/78     Weight: 109.3 kg (241 lb)  Body mass index is 33.61 kg/m².    Intake/Output Summary (Last 24 hours) at 4/10/2019 1103  Last data filed at 4/10/2019 0800  Gross per 24 hour   Intake 1039.62 ml   Output 3815 ml   Net -2775.38 ml      Physical Exam   Constitutional: He is oriented to person, place, and time. He appears well-developed and well-nourished.   HENT:   Head: Normocephalic and atraumatic.   Cardiovascular: Normal rate and regular rhythm.   Pulmonary/Chest: Effort normal. He has no wheezes. He has no rales.   Neurological: He is  alert and oriented to person, place, and time.   Vitals reviewed.       Significant Labs:   CBC:   Recent Labs   Lab 04/09/19  0315 04/10/19  0238   WBC 10.63 10.73   HGB 12.1* 11.5*   HCT 37.3* 35.5*   * 443*     CMP:   Recent Labs   Lab 04/09/19  0315 04/10/19  0238   * 135*   K 3.7 3.5    99   CO2 23 27   * 114*   BUN 22* 24*   CREATININE 0.9 1.0   CALCIUM 8.9 8.9   PROT 6.3 6.3   ALBUMIN 2.3* 2.1*   BILITOT 1.4* 1.3*   ALKPHOS 64 70   AST 19 28   ALT 39 36   ANIONGAP 10 9   EGFRNONAA >60 >60       Significant Imaging:     Assessment/Plan:      * NSTEMI (non-ST elevated myocardial infarction)  Patient with chest pain for 1 brief episode 1 week ago followed by progressive shortness of breath  Patient definitely has a NSTEMI with a troponin of 6.610  EKG concerning for possible STEMI but this was reviewed by Cardiology  Patient treated with full-dose Lovenox for anticoagulation  He has been loaded with aspirin and Plavix, statin not given given abnormal liver function tests  Blood pressure in the normal range currently  Echo pending  Will continue trend markers and monitor closely in the ICU  Cardiology consulted  [facility] on 4/9/19- stent to LAD     VT (ventricular tachycardia)  S/p shock. Now back on IV amio. Cards recs.        Acute congestive heart failure  Patient with likely new onset CHF given presentation and high BNP  Echo was pending- EF 30% with diastolic dysf.   Diuresis and progress with IV Lasix    Acute pulmonary edema  As discussed above  Continue lasix     Acute respiratory failure with hypoxia and hypercapnia  Secondary to pulmonary edema with likely new onset CHF  Chest x-ray on 4/4 with edema as well  Unlikely to be pneumonia, but concerning for possible progression to ARDS  Continue aggressive diuresis with IV Lasix Q 12  Monitor daily weights and strict I/Os  Echo pending  Will check a procalcitonin level      Hypertensive emergency  Patient does not have history of  hypertension  Presented with blood pressure of 215/112 with a pulse of 119  Patient was given IV antihypertensive medications with response in blood pressure  Nitroglycerin drip was ordered but never initiated  Blood pressure normal range at 110-120 systolic  Likely induced by volume overload state with good response with diuresis    Abnormal LFTs  Patient with abnormal liver function tests ongoing for some time  Will check hepatitis panel and continue monitor with repeat labs  LFT's returned to normal     Hyperlipidemia  Will check a lipid panel  Patient with abnormal liver function status which may preclude statin therapy    Diabetes mellitus type II, uncontrolled  Hold outpatient regimen for now  Monitor with Accu-Cheks and give sliding scale insulin as necessary  Glucose goal of 140-180 during hospitalization  Will check a hemoglobin A1c- 9.1     Tobacco use disorder  Patient quit smoking 3 days ago  Smoking cessation counseling done and continued encouragement given for > 3 min      VTE Risk Mitigation (From admission, onward)        Ordered     IP VTE HIGH RISK PATIENT  Once      04/08/19 0519     Place NANCY hose  Until discontinued      04/08/19 0519     Place sequential compression device  Until discontinued      04/08/19 0519          Critical care time spent on the evaluation and treatment of severe organ dysfunction, review of pertinent labs and imaging studies, discussions with consulting providers and discussions with patient/family:40  minutes.    Nguyễn Michel MD  Department of Hospital Medicine   [facility]

## 2019-04-10 NOTE — CODE DOCUMENTATION
At 0409 Pt went into a pulseless ventricular tachycardia.  Code blue called. eICU button pressed.  Heriberto POLK at bedside along with Ritu POLK from the ER.   Pt attached to defibrillator while CPR was started.   Shockable rhythm noted - one shocked delivered at 200 J.  0415 - Pt responsive, answering questions appropriately, feeling a little lightheaded, and bleeding noted from Lt eye, otherwise no other issues noted..  Remained on continuous O2 sat and cardiac monitoring throughout medical emergency.   Heribetro POLK started Pt on Amiodarone (bolus given first, then gtt started at 1 mcg/min).   Cardiology already consulted for this patient who is s/p stent placement.  Family notified about situation.  Will continue to monitor.

## 2019-04-10 NOTE — ASSESSMENT & PLAN NOTE
Acute on chronic Combined systolic and diastolic  Improved post diuresis  Decrease Lasix to 40 IV b.i.d.

## 2019-04-11 PROBLEM — I16.1 HYPERTENSIVE EMERGENCY: Status: RESOLVED | Noted: 2019-01-01 | Resolved: 2019-01-01

## 2019-04-11 PROBLEM — R79.89 ABNORMAL LFTS: Status: RESOLVED | Noted: 2017-02-13 | Resolved: 2019-01-01

## 2019-04-11 NOTE — PROGRESS NOTES
"[facility]  Progress Note    Patient Name: Kristopher Kevin  MRN: [id number]  Patient Class: IP- Inpatient   Admission Date: 4/8/2019  Length of Stay: 3 days  Attending Physician: Nguyễn Moise MD  Primary Care Provider: Jhoan Lundy MD        Subjective:     Principal Problem:NSTEMI (non-ST elevated myocardial infarction)    HPI:  53-year-old male with HLP, diabetes type 2, abnormal liver function tests, irregular heartbeat, tobacco abuse (quit 3 days ago) who presented with worsening shortness of breath x 1 week.  He reports shortness breath started 1 week ago when he "fell ill" with some sort of viral syndrome with associated nausea and vomiting, shortness of breath and chest pain which he attributed to throwing up the self-resolved.  He went to establish care with a new primary care physician and was seen on 04/04/2019 and workup with a chest x-ray was done on 04/05 consistent with pulmonary edema and Lasix was called in for the patient but this medication was never picked up.  He reports shortness of breath gradually continued to get worse, and then finding last night he could no longer lay flat given that he felt like he was being smothered with any attempt.  He reported swelling in his lower extremities; dry cough.  No fevers or chills, no recent travels, no sick contacts.  No reported chest pain since the initial episode that self-resolved 1 week ago.  In the ER, he was in respiratory distress and was placed on BiPAP.  His BNP was 576, initial troponin of 6.610, LFTs with total bili 1.1 and AST/ALT of 57/59, WBC count of 13.40, ABG with pH of 7.265 pCO2 52.1 PO2 of 99 and bicarb of 23.6 on BiPAP, chest x-ray with interval worsening of patchy bilateral mixed interstitial and alveolar opacities compared to prior examination.  Findings may relate to edema, ARDS, or multifocal infection.  EKG reviewed with multiple leads with questionable ST elevation.    Hospital " Course:  53-year-old male with HLP, diabetes type 2, abnormal liver function tests, irregular heartbeat, tobacco abuse (quit 3 days ago) who presented 4/8/19 with worsening shortness of breath x 1 week.  The patient was found to have a non-stemi with a trop > 6 on presentation.  Cards was consulted and the patient went to the ICU with Bi-pap support on 4/8 for hypercapnic resp. Failure. Cards took patient to [facility] on 4/9/19 and placed stent to LAD. The patient had an episode of pulseless V-tach and required a shock.  Cards started patient back on IV amio. Patient found to have an EF of 30% this hospital stay.  Cards states will need life vest on discharge.  The patient had another episode of V-tach requiring electric shock. Cards took to cath lab and balloon pump was initiated.  Cards attempted to transfer to [facility] CCU on 4/10/19 however, no beds were available. Patient awaiting transfer.     Interval History: states some R shoulder pain- but no other issues. Would like to eat     Review of Systems   Constitutional: Negative for activity change, appetite change, chills, diaphoresis and fatigue.   HENT: Negative for congestion.    Respiratory: Negative for chest tightness.    Cardiovascular: Negative for chest pain and palpitations.   Gastrointestinal: Negative for abdominal distention.   Musculoskeletal: Positive for arthralgias.        R shoulder pain (worse with movement)      Objective:     Vital Signs (Most Recent):  Temp: 97 °F (36.1 °C) (04/11/19 0315)  Pulse: 68 (04/11/19 0700)  Resp: 15 (04/11/19 0700)  BP: 114/70 (04/11/19 0700)  SpO2: 96 % (04/11/19 0700) Vital Signs (24h Range):  Temp:  [96.3 °F (35.7 °C)-98 °F (36.7 °C)] 97 °F (36.1 °C)  Pulse:  [68-81] 68  Resp:  [15-41] 15  SpO2:  [95 %-100 %] 96 %  BP: ()/(54-87) 114/70     Weight: 109.3 kg (241 lb)  Body mass index is 33.61 kg/m².    Intake/Output Summary (Last 24 hours) at 4/11/2019 0727  Last data filed at 4/11/2019 0600  Gross per 24 hour    Intake 1050.61 ml   Output 1520 ml   Net -469.39 ml      Physical Exam   Constitutional: He is oriented to person, place, and time. He appears well-developed and well-nourished. No distress.   HENT:   Head: Normocephalic and atraumatic.   Cardiovascular: Normal rate and regular rhythm. Exam reveals no gallop and no friction rub.   Pulmonary/Chest: Effort normal and breath sounds normal. No respiratory distress. He has no wheezes. He has no rales.   Abdominal: Bowel sounds are normal. There is no tenderness.   Musculoskeletal:   Pain on movement of R shoulder    Neurological: He is alert and oriented to person, place, and time.   Vitals reviewed.      Significant Labs:   BMP:   Recent Labs   Lab 04/11/19  0314   *   *   K 3.8   CL 99   CO2 24   BUN 34*   CREATININE 1.1   CALCIUM 9.1   MG 1.9     CBC:   Recent Labs   Lab 04/10/19  0238 04/10/19  1406   WBC 10.73 7.29   HGB 11.5* 12.7*   HCT 35.5* 38.8*   * 400*       Significant Imaging:    Assessment/Plan:      * NSTEMI (non-ST elevated myocardial infarction)  Patient with chest pain for 1 brief episode 1 week ago followed by progressive shortness of breath  Patient definitely has a NSTEMI with a troponin of 6.610  EKG concerning for possible STEMI but this was reviewed by Cardiology  Patient treated with full-dose Lovenox for anticoagulation  He has been loaded with aspirin and Plavix, statin not given given abnormal liver function tests  Blood pressure in the normal range currently  Echo pending  Will continue trend markers and monitor closely in the ICU  Cardiology consulted  [facility] on 4/9/19- stent to LAD   S/p balloon pump on 4/10. Awaiting to transfer to CCU at [facility]     VT (ventricular tachycardia)  S/p shock. Now back on IV amio. Cards recs.        Acute congestive heart failure  Patient with likely new onset CHF given presentation and high BNP  Echo was pending- EF 30% with diastolic dysf.   Diuresis and progress with IV Lasix    Acute  pulmonary edema  As discussed above  Continue lasix     Acute respiratory failure with hypoxia and hypercapnia  Secondary to pulmonary edema with likely new onset CHF  Chest x-ray on 4/4 with edema as well  Unlikely to be pneumonia, but concerning for possible progression to ARDS  Continue aggressive diuresis with IV Lasix Q 12  Monitor daily weights and strict I/Os  Echo pending  Will check a procalcitonin level      Hypertensive emergency  Patient does not have history of hypertension  Presented with blood pressure of 215/112 with a pulse of 119  Patient was given IV antihypertensive medications with response in blood pressure  Nitroglycerin drip was ordered but never initiated  Blood pressure normal range at 110-120 systolic  Likely induced by volume overload state with good response with diuresis    Abnormal LFTs  Patient with abnormal liver function tests ongoing for some time  Will check hepatitis panel and continue monitor with repeat labs  LFT's returned to normal     Hyperlipidemia  Will check a lipid panel  Patient with abnormal liver function status which may preclude statin therapy    Diabetes mellitus type II, uncontrolled  Hold outpatient regimen for now  Monitor with Accu-Cheks and give sliding scale insulin as necessary  Glucose goal of 140-180 during hospitalization  Will check a hemoglobin A1c- 9.1     Tobacco use disorder  Patient quit smoking 3 days ago  Smoking cessation counseling done and continued encouragement given for > 3 min      VTE Risk Mitigation (From admission, onward)        Ordered     IP VTE HIGH RISK PATIENT  Once      04/08/19 0519     Place NANCY hose  Until discontinued      04/08/19 0519     Place sequential compression device  Until discontinued      04/08/19 0519          Critical care time spent on the evaluation and treatment of severe organ dysfunction, review of pertinent labs and imaging studies, discussions with consulting providers and discussions with patient/family:  25  Minutes.    Awaiting transfer to [facility]. Otherwise stable.     Nguyễn Michel MD  Department of Hospital Medicine   [facility]

## 2019-04-11 NOTE — ASSESSMENT & PLAN NOTE
54 y/o male with underlying HTN/DM and poor compliance..  One week of worsening WOODS/orthopnea.. Some preceding CP around this time as well.  . ECHO with combined systolic and diastolic dysfunction and troponin 6 on presentation.. Chest imaging with evolving airspace opacities bilateral. Suspect likely NSTEMi in setting of HTN heart disease and decompensated HF with cardiogenic pulmonary edema.. Did have a slightly mixed picture on presentation as bronchitis like features with elevated PCT.. He was treated with GDMT for HF in addition to abx. [facility] with PCI to LAD. Now s/p VT arrest and IABP insertion. All cultures/RVP have been unrevealing. This AM he is alert and interactive on 3L NC with SpO2 in mid 90's. No increased respiratory effort noted.     -- Continue NIPPV prn and QHS  -- Wean supplemental O2 to maintain SpO2 >90%   -- OK to stop abx   -- Diuresis and optimization of cardiac function as you are doing.

## 2019-04-11 NOTE — SUBJECTIVE & OBJECTIVE
Interval History: VT arrest yesterday s/p cardioversion. Fully awake and conversant post cardioversion. IABP placed and now 1:1. Had rescue NIPPV after IABP placement now on 3-4L NC with SpO2 mid 90's.     This AM he states feeling OK.. No additional runs of VT.. Breathing feels un labored. Occasional pain right shoulder, improved. Mild pain at IABP insertion site.. Controled.   He is scheduled to be transferred to [facility] for cardiology services and consideration of advanced options.     Objective:     Vital Signs (Most Recent):  Temp: 99.2 °F (37.3 °C) (04/11/19 1104)  Pulse: 79 (04/11/19 1000)  Resp: (!) 24 (04/11/19 1000)  BP: 104/63 (04/11/19 1000)  SpO2: 95 % (04/11/19 1000) Vital Signs (24h Range):  Temp:  [96.3 °F (35.7 °C)-99.4 °F (37.4 °C)] 99.2 °F (37.3 °C)  Pulse:  [68-79] 79  Resp:  [15-41] 24  SpO2:  [93 %-100 %] 95 %  BP: ()/(54-87) 104/63     Weight: 109.3 kg (241 lb)  Body mass index is 33.61 kg/m².      Intake/Output Summary (Last 24 hours) at 4/11/2019 1124  Last data filed at 4/11/2019 1000  Gross per 24 hour   Intake 640.81 ml   Output 1515 ml   Net -874.19 ml     Fluid balance since admission- -4L   UOP- 1.59L/24 hours     Physical Exam   Constitutional: He is oriented to person, place, and time. He appears well-developed. He does not have a sickly appearance. He does not appear ill. No distress.   Lying supine    HENT:   Head: Normocephalic and atraumatic.   Eyes: Conjunctivae and EOM are normal. Right eye exhibits no discharge. Left eye exhibits no discharge. No scleral icterus.   Neck: Normal range of motion. Neck supple. No tracheal deviation present. No thyromegaly present.   Cardiovascular: Normal rate, regular rhythm, S1 normal and S2 normal.   No murmur heard.  Pulses:       Radial pulses are 2+ on the right side, and 2+ on the left side.        Dorsalis pedis pulses are 2+ on the right side, and 2+ on the left side.   Trace LE edema bilaterally. Warm extremities    Pulmonary/Chest: No accessory muscle usage. No tachypnea. No respiratory distress. He has no decreased breath sounds. He has no wheezes. He has rales in the right upper field, the right middle field and the left middle field.   Abdominal: Soft. Bowel sounds are normal. He exhibits no distension. There is no tenderness. There is no guarding.   Musculoskeletal:   IABP left groin. Site C/D/I   No significant hematoma   Distal extremity is NVI      Lymphadenopathy:     He has no cervical adenopathy.   Neurological: He is alert and oriented to person, place, and time.   Conversant, moving all extremities spontaneously    Skin: Skin is warm and dry. Capillary refill takes 2 to 3 seconds. He is not diaphoretic. No pallor.       Vents:  Oxygen Concentration (%): 5 (04/11/19 0915)    Lines/Drains/Airways     Drain                 Urethral Catheter 04/08/19 0630 Non-latex;Straight-tip 16 Fr. 3 days          Peripheral Intravenous Line                 Peripheral IV - Single Lumen 04/08/19 0251 Left Hand 3 days         Peripheral IV - Single Lumen 04/11/19 0800 Posterior;Right Hand less than 1 day                Significant Labs:    CBC/Anemia Profile:  Recent Labs   Lab 04/10/19  0238 04/10/19  1406   WBC 10.73 7.29   HGB 11.5* 12.7*   HCT 35.5* 38.8*   * 400*   MCV 87 87   RDW 12.8 12.9        Chemistries:  Recent Labs   Lab 04/10/19  0238 04/10/19  1406 04/11/19  0314   * 135* 135*   K 3.5 4.2 3.8   CL 99 100 99   CO2 27 24 24   BUN 24* 31* 34*   CREATININE 1.0 1.1 1.1   CALCIUM 8.9 9.6 9.1   ALBUMIN 2.1*  --  2.1*   PROT 6.3  --  6.2   BILITOT 1.3*  --  0.7   ALKPHOS 70  --  74   ALT 36  --  47*   AST 28  --  34   MG 1.6 2.3 1.9   PHOS 4.0 4.8* 4.4     PCT- 2        Micro-     Blood cultures- NGTD   RVP- not collected   Sputum Cx- not collected.   Legionella Ag- Negative   HIV Negative     Significant Imaging:  I have reviewed all pertinent imaging results/findings within the past 24 hours.     CXR- Interval  insertion of an IABP with its tip at the T4-5 level.  No significant change in the cardiopulmonary status since yesterday's study.  Continued bilateral airspace consolidation, most pronounced at the upper to midlung zones.  No pneumothorax or other detrimental change.        ECHO:   · Moderately decreased left ventricular systolic function. The estimated ejection fraction is 30%  · Concentric left ventricular hypertrophy.  · Grade III (severe) left ventricular diastolic dysfunction consistent with restrictive physiology.  · Elevated left atrial pressure.  · Mildly to moderately reduced right ventricular systolic function.  · The estimated PA systolic pressure is 40 mm Hg        Left Heart Cath:     · Single vessel coronary artery disease.  · Successful PCI of culprit proximal LAD lesion.  · Prox LAD lesion , 80% stenosed reduced to 0%..  · A STENT RESOLUTE MARC [id number] stent was successfully placed at 12 ISABELA  · Filling pressures on the right and left are severely elevated. Pulmonary HTN is moderate.  · Post stent deployment IVUS showed adequate deployment without significant residual at the stent edges

## 2019-04-11 NOTE — ASSESSMENT & PLAN NOTE
Patient with chest pain for 1 brief episode 1 week ago followed by progressive shortness of breath  Patient definitely has a NSTEMI with a troponin of 6.610  EKG concerning for possible STEMI but this was reviewed by Cardiology  Patient treated with full-dose Lovenox for anticoagulation  He has been loaded with aspirin and Plavix, statin not given given abnormal liver function tests  Blood pressure in the normal range currently  Echo pending  Will continue trend markers and monitor closely in the ICU  Cardiology consulted  [facility] on 4/9/19- stent to LAD   S/p balloon pump on 4/10. Awaiting to transfer to CCU at [facility]

## 2019-04-11 NOTE — PROGRESS NOTES
"0750 Spoke with MD Jacobo concerning pt's magnesium level 1.9, new orders received fro 2gm mag ivpb x1.   0845 MD Crandall on the unit informed of pt sats 97 % on bipap, currently placed on 4lnc for medication administration, sats 95%, no shortness of breath reported, pt with improved comfort. MD Crandall agrees with adjusting oxygen, informed RN if pt complains of shortness of breath, sats decrease, or is sleeping replace back on bipap intermittently throughout the shift as needed. Verified Bipap currently ordered as prn. Also, order for current po antibiotic with warning for prolongation of QTc in combination with amiodarone. New orders received to d/c antibiotic. Orders for viral panel, asked if this specimen required, new orders received to d/c viral panel specimen.   0929 On IABP Trigger automatically adjusting to pressure as trigger versus ECG as trigger. No alarms. Called [employer] rep [id number] to verify considerations upon transferring to the main campus and trouble shooting trigger. Verification of battery life completed with REP Elyse on the phone, battery life at full capacity. Helium tank greater than half full. Also verified 60 ml syringe with stopcock in place for ambulance service if needed. Informed Rep of trigger switching back and forth from ECG to pressure. Rep informed RN to adjust ECG leads above the patient's diaphragm. RN completed adjustments of leads, Trigger now ECG. Will monitor. Rep # [phone] direct line this shift.  1000 Lasix 40 mg ordered IV, current bp 101/59 map 74, IABP 91/55 mean 78, augmented pressure 113. Informed MD Jacobo, verified ok to give lasix IV, MD agrees with administration. Verified placement of IABP on cxray. MD states "in appropriate position".  1200 Pt with order for resp culture, verified with MD Moise, new order to discontinue resp culture. Also, informed MD Moise of new orders for d/c of antibiotic due to risk for QTc prolongation in congruent use " "with amiodarone. Md agrees with order.  1300 Pt's arterial waveform on IABP with whip, previously noted on initial IABP strips printed. No alarms. Called and spoke with Rep Elyse [place]. No other troubleshooting advised, informed RN to notify Cardiologist concerning arterial line waveform. Notified MD Jacobo. MD Jacobo aware of whip noted on arterial line waveform. MD states it has been there since arrival to ICU post cath lab. Xray's for placement since then have been obtained and IABP in good position. Will monitor. No new orders or changes needed.  1525 Called and spoke with MD Moise concerning pt's right shoulder pain intermittent, MD Moise and MD Jacobo aware of intermittent right shoulder pain, worse with movement. New orders received. Pt updated.  1736 Pt awake and alert vtach noted on monitor, pt remained awake throughout, nurses at bedside and code called, code cart brought in room, pads already on pt, connected to AED, pt returned to NSR without any intervention, during the episode, became restless and asking, "Is something happening?". Reassurance provided to pt throughout this time. Vtach episode 14 seconds.  Family was at bedside, stepped out for emergency, and updated accordingly. Called and reported to MD Jacobo, new orders received. Amiodarone gtt increased to 1mg/min and stat labs ordered. Pt re-placed back on bipap. Education provided.  1816 report called to Dusty RICHARDS at [facility].   1830 Called and informed MD Jacobo pt transferring to [facility]. REport called. Orders for Lasix, awaiting BMP results. MD Orders to hold current dose of Lasix.   "

## 2019-04-11 NOTE — SUBJECTIVE & OBJECTIVE
Interval History: states some R shoulder pain- but no other issues. Would like to eat     Review of Systems   Constitutional: Negative for activity change, appetite change, chills, diaphoresis and fatigue.   HENT: Negative for congestion.    Respiratory: Negative for chest tightness.    Cardiovascular: Negative for chest pain and palpitations.   Gastrointestinal: Negative for abdominal distention.   Musculoskeletal: Positive for arthralgias.        R shoulder pain (worse with movement)      Objective:     Vital Signs (Most Recent):  Temp: 97 °F (36.1 °C) (04/11/19 0315)  Pulse: 68 (04/11/19 0700)  Resp: 15 (04/11/19 0700)  BP: 114/70 (04/11/19 0700)  SpO2: 96 % (04/11/19 0700) Vital Signs (24h Range):  Temp:  [96.3 °F (35.7 °C)-98 °F (36.7 °C)] 97 °F (36.1 °C)  Pulse:  [68-81] 68  Resp:  [15-41] 15  SpO2:  [95 %-100 %] 96 %  BP: ()/(54-87) 114/70     Weight: 109.3 kg (241 lb)  Body mass index is 33.61 kg/m².    Intake/Output Summary (Last 24 hours) at 4/11/2019 0727  Last data filed at 4/11/2019 0600  Gross per 24 hour   Intake 1050.61 ml   Output 1520 ml   Net -469.39 ml      Physical Exam   Constitutional: He is oriented to person, place, and time. He appears well-developed and well-nourished. No distress.   HENT:   Head: Normocephalic and atraumatic.   Cardiovascular: Normal rate and regular rhythm. Exam reveals no gallop and no friction rub.   Pulmonary/Chest: Effort normal and breath sounds normal. No respiratory distress. He has no wheezes. He has no rales.   Abdominal: Bowel sounds are normal. There is no tenderness.   Musculoskeletal:   Pain on movement of R shoulder    Neurological: He is alert and oriented to person, place, and time.   Vitals reviewed.      Significant Labs:   BMP:   Recent Labs   Lab 04/11/19 0314   *   *   K 3.8   CL 99   CO2 24   BUN 34*   CREATININE 1.1   CALCIUM 9.1   MG 1.9     CBC:   Recent Labs   Lab 04/10/19  0238 04/10/19  1406   WBC 10.73 7.29   HGB 11.5*  12.7*   HCT 35.5* 38.8*   * 400*       Significant Imaging:

## 2019-04-11 NOTE — PLAN OF CARE
Problem: Adult Inpatient Plan of Care  Goal: Plan of Care Review  Outcome: Ongoing (interventions implemented as appropriate)  Plan of care reviewed. No falls/injuries this shift. Monitoring IABP site, drsg c/d/i. Awaiting transfer to [facility] main campus. Diet advanced. Tolerating Oxygen weaning. Remains on amiodarone drip. Temp max 99.4. Pain assessed, prn pain medication adjusted. Pt able to rest intermittently this shift. Blood sugar monitored.

## 2019-04-11 NOTE — PROGRESS NOTES
[facility]  Cardiology  Progress Note    Patient Name: Kristopher Kevin  MRN: [id number]  Admission Date: 4/8/2019  Hospital Length of Stay: 3 days  Code Status: Full Code   Attending Physician: Nguyễn Moise MD   Primary Care Physician: Jhoan Lundy MD  Expected Discharge Date:   Principal Problem:NSTEMI (non-ST elevated myocardial infarction)    Subjective:     Hospital Course:   4-8:  Admitted with hypertensive emergency  4-9:  Status post PCI of the LAD  4-10:  Recurrent VT/VF arrest post resuscitation, IABP placed    Interval History:  Denies any chest pain and feels stable in terms of shortness of breath.  No further episodes of VT/VF noted overnight post IABP placement.  There are no problems at the left groin access site.  He did have brief episode of occlusion of the lumen of the arterial line which was flushed with calf flow and appears to be working sufficiently at this time.  He is pending transfer to [facility] for higher level of care.    Telemetry:  Normal sinus rhythm    Review of Systems   All other systems reviewed and are negative.    Objective:     Vital Signs (Most Recent):  Temp: 97 °F (36.1 °C) (04/11/19 0315)  Pulse: 68 (04/11/19 0700)  Resp: 15 (04/11/19 0700)  BP: 114/70 (04/11/19 0700)  SpO2: 96 % (04/11/19 0700) Vital Signs (24h Range):  Temp:  [96.3 °F (35.7 °C)-98 °F (36.7 °C)] 97 °F (36.1 °C)  Pulse:  [68-81] 68  Resp:  [15-41] 15  SpO2:  [95 %-100 %] 96 %  BP: ()/(54-87) 114/70     Weight: 109.3 kg (241 lb)  Body mass index is 33.61 kg/m².     SpO2: 96 %  O2 Device (Oxygen Therapy): BiPAP      Intake/Output Summary (Last 24 hours) at 4/11/2019 0744  Last data filed at 4/11/2019 0600  Gross per 24 hour   Intake 1050.61 ml   Output 1520 ml   Net -469.39 ml       Lines/Drains/Airways     Drain                 Urethral Catheter 04/08/19 0630 Non-latex;Straight-tip 16 Fr. 3 days          Peripheral Intravenous Line                 Peripheral IV - Single  Lumen 04/08/19 0251 Left Hand 3 days         Peripheral IV - Single Lumen 04/08/19 0251 Right Antecubital 3 days                Physical Exam   Constitutional: He is oriented to person, place, and time. He appears well-developed and well-nourished. No distress.   HENT:   Head: Normocephalic and atraumatic.   Eyes: Pupils are equal, round, and reactive to light. Conjunctivae and EOM are normal.   Neck: Normal range of motion. Neck supple. No thyromegaly present.   Cardiovascular: Normal rate, regular rhythm and normal heart sounds.   No murmur heard.  Pulmonary/Chest: Effort normal and breath sounds normal. No respiratory distress. He has no wheezes. He has no rales. He exhibits no tenderness.   Abdominal: Soft. Bowel sounds are normal.   Musculoskeletal: He exhibits no edema.   Left groin IABP without hematoma or bruit  Right groin no hematoma or bruit at the access site with Angio-Seal previous swelling secondary to inguinal hernia   Neurological: He is alert and oriented to person, place, and time.   Skin: Skin is warm and dry.   Psychiatric: He has a normal mood and affect. His behavior is normal.       Significant Labs:   ABG: No results for input(s): PH, PCO2, HCO3, POCSATURATED, BE in the last 48 hours., BMP:   Recent Labs   Lab 04/10/19  0238 04/10/19  1406 04/11/19  0314   * 173* 139*   * 135* 135*   K 3.5 4.2 3.8   CL 99 100 99   CO2 27 24 24   BUN 24* 31* 34*   CREATININE 1.0 1.1 1.1   CALCIUM 8.9 9.6 9.1   MG 1.6 2.3 1.9   , CBC   Recent Labs   Lab 04/10/19  0238 04/10/19  1406   WBC 10.73 7.29   HGB 11.5* 12.7*   HCT 35.5* 38.8*   * 400*   , INR No results for input(s): INR, PROTIME in the last 48 hours., Lipid Panel No results for input(s): CHOL, HDL, LDLCALC, TRIG, CHOLHDL in the last 48 hours. and Troponin   Recent Labs   Lab 04/10/19  1406   TROPONINI 4.509*       Significant Imaging: Echocardiogram:   Transthoracic echo (TTE) complete (Cupid Only):   Results for orders placed or  performed during the hospital encounter of 04/08/19   Transthoracic echo (TTE) 2D with Color Flow   Result Value Ref Range    BSA 2.34 m2    TDI SEPTAL 0.04     LV LATERAL E/E' RATIO 13.38     LV SEPTAL E/E' RATIO 26.75     LA WIDTH 4.34 cm    AORTIC VALVE CUSP SEPERATION 2.58 cm    TDI LATERAL 0.08     PV PEAK VELOCITY 1.09 cm/s    LVIDD 4.70 3.5 - 6.0 cm    IVS 1.51 (A) 0.6 - 1.1 cm    PW 1.45 (A) 0.6 - 1.1 cm    Ao root annulus 3.58 cm    LVIDS 3.98 2.1 - 4.0 cm    FS 15 28 - 44 %    LA volume 59.68 cm3    Sinus 3.23 cm    STJ 2.78 cm    Ascending aorta 3.04 cm    LV mass 288.17 g    LA size 2.94 cm    RVDD 4.27 cm    TAPSE 2.00 cm    RV S' 16.79 m/s    Left Ventricle Relative Wall Thickness 0.62 cm    AV mean gradient 4.42 mmHg    AV valve area 2.99 cm2    AV Velocity Ratio 0.98     AV index (prosthetic) 0.92     E/A ratio 2.02     Mean e' 0.06     E wave decelartion time 151.03 msec    IVRT 0.10 msec    Pulm vein S/D ratio 0.69     LVOT diameter 2.03 cm    LVOT area 3.23 cm2    LVOT peak shree [id number] m/s    LVOT peak VTI 22.12 cm    Ao peak shree 1.23 m/s    Ao VTI 23.93 cm    LVOT stroke volume 71.56 cm3    AV peak gradient 6.05 mmHg    E/E' ratio 17.83     MV Peak E Shree 1.07 m/s    TR Max Shree 3.06 m/s    MV Peak A Shree 0.53 m/s    PV Peak S Shree 0.44 m/s    PV Peak D Shree 0.64 m/s    LV Systolic Volume 69.32 mL    LV Systolic Volume Index 30.4 mL/m2    LV Diastolic Volume 102.41 mL    LV Diastolic Volume Index 44.86 mL/m2    LA Volume Index 26.1 mL/m2    LV Mass Index 126.2 g/m2    RA Major Axis 4.58 cm    Left Atrium Minor Axis 5.39 cm    Left Atrium Major Axis 5.62 cm    Triscuspid Valve Regurgitation Peak Gradient 37.45 mmHg    RA Width 4.30 cm    Right Atrial Pressure (from IVC) 3 mmHg    TV rest pulmonary artery pressure 40 mmHg     · Moderately decreased left ventricular systolic function. The estimated ejection fraction is 30%  · Concentric left ventricular hypertrophy.  · Grade III (severe) left  ventricular diastolic dysfunction consistent with restrictive physiology.  · Elevated left atrial pressure.  · Mildly to moderately reduced right ventricular systolic function.  · The estimated PA systolic pressure is 40 mm Hg    LHC:  · Single vessel coronary artery disease.  · Successful PCI of culprit proximal LAD lesion.  · Prox LAD lesion , 80% stenosed reduced to 0%..  · A STENT RESOLUTE MARC [id number] stent was successfully placed at 12 ISABELA  · Filling pressures on the right and left are severely elevated. Pulmonary HTN is moderate.  · Post stent deployment IVUS showed adequate deployment without significant residual at the stent edges    Assessment and Plan:     Brief HPI:  Stable post IABP placement yesterday no further arrhythmias.  Awaiting [facility]    * NSTEMI (non-ST elevated myocardial infarction)  late presenting MI  Status post PCI of the LAD  Continue medicines for secondary prevention    VT (ventricular tachycardia)  Post MI received defibrillation 4-10  On amiodarone IV --> change to 400 mg p.o. b.i.d.  Also with electrolyte abnormalities to be corrected by primary  Will need life vest prior to discharge    Acute congestive heart failure  Acute on chronic Combined systolic and diastolic  Improved post diuresis  Decrease Lasix to 40 IV b.i.d.    Hypertensive emergency  Stable after addition oral meds      Diabetes mellitus type II, uncontrolled  Per IM    Tobacco use disorder  Counseled    Hyperlipidemia  Initiate statin  Follow LFTs    Obesity, unspecified  Diet and exercise    Abnormal LFTs  Currently stable and likely congestive  Received amiodarone in the ED  Currently will do half dose statin and continue follow closely    Acute respiratory failure with hypoxia and hypercapnia  Secondary to pulmonary edema  Currently stable on BiPAP  Pulmonary following        VTE Risk Mitigation (From admission, onward)        Ordered     IP VTE HIGH RISK PATIENT  Once      04/08/19 0519      Place NANCY hose  Until discontinued      04/08/19 0519     Place sequential compression device  Until discontinued      04/08/19 0519          Adrian Jacobo MD  Cardiology  [facility]

## 2019-04-11 NOTE — SUBJECTIVE & OBJECTIVE
Interval History:  Denies any chest pain and feels stable in terms of shortness of breath.  No further episodes of VT/VF noted overnight post IABP placement.  There are no problems at the left groin access site.  He did have brief episode of occlusion of the lumen of the arterial line which was flushed with calf flow and appears to be working sufficiently at this time.  He is pending transfer to [facility] for higher level of care.    Telemetry:  Normal sinus rhythm    Review of Systems   All other systems reviewed and are negative.    Objective:     Vital Signs (Most Recent):  Temp: 97 °F (36.1 °C) (04/11/19 0315)  Pulse: 68 (04/11/19 0700)  Resp: 15 (04/11/19 0700)  BP: 114/70 (04/11/19 0700)  SpO2: 96 % (04/11/19 0700) Vital Signs (24h Range):  Temp:  [96.3 °F (35.7 °C)-98 °F (36.7 °C)] 97 °F (36.1 °C)  Pulse:  [68-81] 68  Resp:  [15-41] 15  SpO2:  [95 %-100 %] 96 %  BP: ()/(54-87) 114/70     Weight: 109.3 kg (241 lb)  Body mass index is 33.61 kg/m².     SpO2: 96 %  O2 Device (Oxygen Therapy): BiPAP      Intake/Output Summary (Last 24 hours) at 4/11/2019 0744  Last data filed at 4/11/2019 0600  Gross per 24 hour   Intake 1050.61 ml   Output 1520 ml   Net -469.39 ml       Lines/Drains/Airways     Drain                 Urethral Catheter 04/08/19 0630 Non-latex;Straight-tip 16 Fr. 3 days          Peripheral Intravenous Line                 Peripheral IV - Single Lumen 04/08/19 0251 Left Hand 3 days         Peripheral IV - Single Lumen 04/08/19 0251 Right Antecubital 3 days                Physical Exam   Constitutional: He is oriented to person, place, and time. He appears well-developed and well-nourished. No distress.   HENT:   Head: Normocephalic and atraumatic.   Eyes: Pupils are equal, round, and reactive to light. Conjunctivae and EOM are normal.   Neck: Normal range of motion. Neck supple. No thyromegaly present.   Cardiovascular: Normal rate, regular rhythm and normal heart sounds.   No murmur  heard.  Pulmonary/Chest: Effort normal and breath sounds normal. No respiratory distress. He has no wheezes. He has no rales. He exhibits no tenderness.   Abdominal: Soft. Bowel sounds are normal.   Musculoskeletal: He exhibits no edema.   Left groin IABP without hematoma or bruit  Right groin no hematoma or bruit at the access site with Angio-Seal previous swelling secondary to inguinal hernia   Neurological: He is alert and oriented to person, place, and time.   Skin: Skin is warm and dry.   Psychiatric: He has a normal mood and affect. His behavior is normal.       Significant Labs:   ABG: No results for input(s): PH, PCO2, HCO3, POCSATURATED, BE in the last 48 hours., BMP:   Recent Labs   Lab 04/10/19  0238 04/10/19  1406 04/11/19  0314   * 173* 139*   * 135* 135*   K 3.5 4.2 3.8   CL 99 100 99   CO2 27 24 24   BUN 24* 31* 34*   CREATININE 1.0 1.1 1.1   CALCIUM 8.9 9.6 9.1   MG 1.6 2.3 1.9   , CBC   Recent Labs   Lab 04/10/19  0238 04/10/19  1406   WBC 10.73 7.29   HGB 11.5* 12.7*   HCT 35.5* 38.8*   * 400*   , INR No results for input(s): INR, PROTIME in the last 48 hours., Lipid Panel No results for input(s): CHOL, HDL, LDLCALC, TRIG, CHOLHDL in the last 48 hours. and Troponin   Recent Labs   Lab 04/10/19  1406   TROPONINI 4.509*       Significant Imaging: Echocardiogram:   Transthoracic echo (TTE) complete (Cupid Only):   Results for orders placed or performed during the hospital encounter of 04/08/19   Transthoracic echo (TTE) 2D with Color Flow   Result Value Ref Range    BSA 2.34 m2    TDI SEPTAL 0.04     LV LATERAL E/E' RATIO 13.38     LV SEPTAL E/E' RATIO 26.75     LA WIDTH 4.34 cm    AORTIC VALVE CUSP SEPERATION 2.58 cm    TDI LATERAL 0.08     PV PEAK VELOCITY 1.09 cm/s    LVIDD 4.70 3.5 - 6.0 cm    IVS 1.51 (A) 0.6 - 1.1 cm    PW 1.45 (A) 0.6 - 1.1 cm    Ao root annulus 3.58 cm    LVIDS 3.98 2.1 - 4.0 cm    FS 15 28 - 44 %    LA volume 59.68 cm3    Sinus 3.23 cm    STJ 2.78 cm     Ascending aorta 3.04 cm    LV mass 288.17 g    LA size 2.94 cm    RVDD 4.27 cm    TAPSE 2.00 cm    RV S' 16.79 m/s    Left Ventricle Relative Wall Thickness 0.62 cm    AV mean gradient 4.42 mmHg    AV valve area 2.99 cm2    AV Velocity Ratio 0.98     AV index (prosthetic) 0.92     E/A ratio 2.02     Mean e' 0.06     E wave decelartion time 151.03 msec    IVRT 0.10 msec    Pulm vein S/D ratio 0.69     LVOT diameter 2.03 cm    LVOT area 3.23 cm2    LVOT peak shree [id number] m/s    LVOT peak VTI 22.12 cm    Ao peak shree 1.23 m/s    Ao VTI 23.93 cm    LVOT stroke volume 71.56 cm3    AV peak gradient 6.05 mmHg    E/E' ratio 17.83     MV Peak E Shree 1.07 m/s    TR Max Shree 3.06 m/s    MV Peak A Shree 0.53 m/s    PV Peak S Shree 0.44 m/s    PV Peak D Shree 0.64 m/s    LV Systolic Volume 69.32 mL    LV Systolic Volume Index 30.4 mL/m2    LV Diastolic Volume 102.41 mL    LV Diastolic Volume Index 44.86 mL/m2    LA Volume Index 26.1 mL/m2    LV Mass Index 126.2 g/m2    RA Major Axis 4.58 cm    Left Atrium Minor Axis 5.39 cm    Left Atrium Major Axis 5.62 cm    Triscuspid Valve Regurgitation Peak Gradient 37.45 mmHg    RA Width 4.30 cm    Right Atrial Pressure (from IVC) 3 mmHg    TV rest pulmonary artery pressure 40 mmHg     · Moderately decreased left ventricular systolic function. The estimated ejection fraction is 30%  · Concentric left ventricular hypertrophy.  · Grade III (severe) left ventricular diastolic dysfunction consistent with restrictive physiology.  · Elevated left atrial pressure.  · Mildly to moderately reduced right ventricular systolic function.  · The estimated PA systolic pressure is 40 mm Hg    LHC:  · Single vessel coronary artery disease.  · Successful PCI of culprit proximal LAD lesion.  · Prox LAD lesion , 80% stenosed reduced to 0%..  · A STENT RESOLUTE MARC [id number] stent was successfully placed at 12 ISABELA  · Filling pressures on the right and left are severely elevated. Pulmonary HTN is moderate.  · Post  stent deployment IVUS showed adequate deployment without significant residual at the stent edges

## 2019-04-11 NOTE — PLAN OF CARE
Problem: Adult Inpatient Plan of Care  Goal: Plan of Care Review  Outcome: Ongoing (interventions implemented as appropriate)  Remains in ICU w/ left groin balloon pump in place. Palpable pulses and adequate urinary output. Amio infusing at 0.5 mg/minute, NSR on monitor, no ectopy noted overnight. BIPAP in place w/ 30% O215/10, Rate 16. NPO status maintained except ice chips. Awaiting transfer to [facility]. C/O intermittent pain to right upper arm throughout night to humerus area. Pain relived w/ Morphine and massage. Remains free of falls and injuries.

## 2019-04-11 NOTE — ASSESSMENT & PLAN NOTE
S/P PCI to LAD/IABP insertion. GDMT per cardiology. Agree with transfer to [facility]

## 2019-04-11 NOTE — PROGRESS NOTES
[facility]  Pulmonology  Progress Note    Patient Name: Kristopher Kevin  MRN: [id number]  Admission Date: 4/8/2019  Hospital Length of Stay: 3 days  Code Status: Full Code  Attending Provider: Nguyễn Moise MD  Primary Care Provider: Jhoan Lundy MD   Principal Problem: NSTEMI (non-ST elevated myocardial infarction)    Subjective:     Interval History: VT arrest yesterday s/p cardioversion. Fully awake and conversant post cardioversion. IABP placed and now 1:1. Had rescue NIPPV after IABP placement now on 3-4L NC with SpO2 mid 90's.     This AM he states feeling OK.. No additional runs of VT.. Breathing feels un labored. Occasional pain right shoulder, improved. Mild pain at IABP insertion site.. Controled.   He is scheduled to be transferred to [facility] for cardiology services and consideration of advanced options.     Objective:     Vital Signs (Most Recent):  Temp: 99.2 °F (37.3 °C) (04/11/19 1104)  Pulse: 79 (04/11/19 1000)  Resp: (!) 24 (04/11/19 1000)  BP: 104/63 (04/11/19 1000)  SpO2: 95 % (04/11/19 1000) Vital Signs (24h Range):  Temp:  [96.3 °F (35.7 °C)-99.4 °F (37.4 °C)] 99.2 °F (37.3 °C)  Pulse:  [68-79] 79  Resp:  [15-41] 24  SpO2:  [93 %-100 %] 95 %  BP: ()/(54-87) 104/63     Weight: 109.3 kg (241 lb)  Body mass index is 33.61 kg/m².      Intake/Output Summary (Last 24 hours) at 4/11/2019 1124  Last data filed at 4/11/2019 1000  Gross per 24 hour   Intake 640.81 ml   Output 1515 ml   Net -874.19 ml     Fluid balance since admission- -4L   UOP- 1.59L/24 hours     Physical Exam   Constitutional: He is oriented to person, place, and time. He appears well-developed. He does not have a sickly appearance. He does not appear ill. No distress.   Lying supine    HENT:   Head: Normocephalic and atraumatic.   Eyes: Conjunctivae and EOM are normal. Right eye exhibits no discharge. Left eye exhibits no discharge. No scleral icterus.   Neck: Normal range of motion. Neck supple. No  tracheal deviation present. No thyromegaly present.   Cardiovascular: Normal rate, regular rhythm, S1 normal and S2 normal.   No murmur heard.  Pulses:       Radial pulses are 2+ on the right side, and 2+ on the left side.        Dorsalis pedis pulses are 2+ on the right side, and 2+ on the left side.   Trace LE edema bilaterally. Warm extremities   Pulmonary/Chest: No accessory muscle usage. No tachypnea. No respiratory distress. He has no decreased breath sounds. He has no wheezes. He has rales in the right upper field, the right middle field and the left middle field.   Abdominal: Soft. Bowel sounds are normal. He exhibits no distension. There is no tenderness. There is no guarding.   Musculoskeletal:   IABP left groin. Site C/D/I   No significant hematoma   Distal extremity is NVI      Lymphadenopathy:     He has no cervical adenopathy.   Neurological: He is alert and oriented to person, place, and time.   Conversant, moving all extremities spontaneously    Skin: Skin is warm and dry. Capillary refill takes 2 to 3 seconds. He is not diaphoretic. No pallor.       Vents:  Oxygen Concentration (%): 5 (04/11/19 0915)    Lines/Drains/Airways     Drain                 Urethral Catheter 04/08/19 0630 Non-latex;Straight-tip 16 Fr. 3 days          Peripheral Intravenous Line                 Peripheral IV - Single Lumen 04/08/19 0251 Left Hand 3 days         Peripheral IV - Single Lumen 04/11/19 0800 Posterior;Right Hand less than 1 day                Significant Labs:    CBC/Anemia Profile:  Recent Labs   Lab 04/10/19  0238 04/10/19  1406   WBC 10.73 7.29   HGB 11.5* 12.7*   HCT 35.5* 38.8*   * 400*   MCV 87 87   RDW 12.8 12.9        Chemistries:  Recent Labs   Lab 04/10/19  0238 04/10/19  1406 04/11/19  0314   * 135* 135*   K 3.5 4.2 3.8   CL 99 100 99   CO2 27 24 24   BUN 24* 31* 34*   CREATININE 1.0 1.1 1.1   CALCIUM 8.9 9.6 9.1   ALBUMIN 2.1*  --  2.1*   PROT 6.3  --  6.2   BILITOT 1.3*  --  0.7    ALKPHOS 70  --  74   ALT 36  --  47*   AST 28  --  34   MG 1.6 2.3 1.9   PHOS 4.0 4.8* 4.4     PCT- 2        Micro-     Blood cultures- NGTD   RVP- not collected   Sputum Cx- not collected.   Legionella Ag- Negative   HIV Negative     Significant Imaging:  I have reviewed all pertinent imaging results/findings within the past 24 hours.     CXR- Interval insertion of an IABP with its tip at the T4-5 level.  No significant change in the cardiopulmonary status since yesterday's study.  Continued bilateral airspace consolidation, most pronounced at the upper to midlung zones.  No pneumothorax or other detrimental change.        ECHO:   · Moderately decreased left ventricular systolic function. The estimated ejection fraction is 30%  · Concentric left ventricular hypertrophy.  · Grade III (severe) left ventricular diastolic dysfunction consistent with restrictive physiology.  · Elevated left atrial pressure.  · Mildly to moderately reduced right ventricular systolic function.  · The estimated PA systolic pressure is 40 mm Hg        Left Heart Cath:     · Single vessel coronary artery disease.  · Successful PCI of culprit proximal LAD lesion.  · Prox LAD lesion , 80% stenosed reduced to 0%..  · A STENT RESOLUTE MARC [id number] stent was successfully placed at 12 ISABELA  · Filling pressures on the right and left are severely elevated. Pulmonary HTN is moderate.  · Post stent deployment IVUS showed adequate deployment without significant residual at the stent edges         Assessment/Plan:     * NSTEMI (non-ST elevated myocardial infarction)  S/P PCI to LAD/IABP insertion. GDMT per cardiology. Agree with transfer to [facility]    VT (ventricular tachycardia)  Amiodarone/IABP.     Acute respiratory failure with hypoxia and hypercapnia      52 y/o male with underlying HTN/DM and poor compliance..  One week of worsening WOODS/orthopnea.. Some preceding CP around this time as well.  . ECHO with combined systolic and diastolic  dysfunction and troponin 6 on presentation.. Chest imaging with evolving airspace opacities bilateral. Suspect likely NSTEMi in setting of HTN heart disease and decompensated HF with cardiogenic pulmonary edema.. Did have a slightly mixed picture on presentation as bronchitis like features with elevated PCT.. He was treated with GDMT for HF in addition to abx. [facility] with PCI to LAD. Now s/p VT arrest and IABP insertion. All cultures/RVP have been unrevealing. This AM he is alert and interactive on 3L NC with SpO2 in mid 90's. No increased respiratory effort noted.     -- Continue NIPPV prn and QHS  -- Wean supplemental O2 to maintain SpO2 >90%   -- OK to stop abx   -- Diuresis and optimization of cardiac function as you are doing.         Diabetes mellitus type II, uncontrolled  Hb A1C 9.1. On insulin detemir 10U QHS +SSI. Goal 140-180     DVT PPX- NANCY/SCD   Nutrition- PO diet        Critical Care Time: 35 minutes  Critical care was time spent personally by me on the following activities: evaluating this patient's organ dysfunction, development of treatment plan, discussing treatment plan with patient or surrogate and bedside caregivers, discussions with consultants, evaluation of patient's response to treatment, examination of patient, ordering and performing treatments and interventions, ordering and review of laboratory studies, ordering and review of radiographic studies, re-evaluation of patient's condition. This critical care time did not overlap with that of any other provider or involve time for any procedures.     Jacob Crandall MD  Pulmonology/Critical Care Medicine   [facility] [place]

## 2019-04-11 NOTE — PLAN OF CARE
04/11/19 1056   Discharge Reassessment   Assessment Type Discharge Planning Reassessment   Discharge Plan A   (Pt transferring to main [facility])   Discharge Plan B Home Health   DME Needed Upon Discharge  other (see comments)  (PT/OT to determine. Possible life vest.)   Patient choice form signed by patient/caregiver N/A   Describe the patient's ability to walk at the present time. Major restrictions/daily assistance from another person   How often would a person be available to care for the patient? Often   Pt transferring to [facility] for higher level of care. Help at Home: Estelita (Spouse) [phone]. Prefers afternoon appointments.

## 2019-04-12 PROBLEM — J96.01 ACUTE RESPIRATORY FAILURE WITH HYPOXIA: Status: ACTIVE | Noted: 2019-01-01

## 2019-04-12 NOTE — SUBJECTIVE & OBJECTIVE
"Interval History: Patient admitted from OSH overnight for management of late presenting NSTEMI s/p PCI and recurrent PMVT. See details below regarding course of events prior to transfer.    Patient examined at bedside this morning. Currently asymptomatic and no complaints. Index anginal symptoms reported to be "gas type discomfort in my chest" per patient which started one week prior to admission to OSH. He is an uncontrolled diabetic (A1c 9.1) with hypertension and active tobacco use (1 ppd x 30 years).     Telemetry reviewed with no episodes of ventricular arrhthymias overnight. Currently SR 70s.      Timeline at OSH:  - 4/8/19: patient presented to ED with c/o SOB, CP x 1 week found to have NSTEMI (trop > 6.6)  - 4/9/19: taken for urgent C s/p PCI/SHIRA to LAD (80% lesion); had catheter induced VT upon entering the LV s/p shock   - 4/10/19 @ 4:08: patient went into sustained PMVT s/p 200 J x1 followed by amio bolus and infusion   - 4/10/19 @ 13:49: recurrent episode of sustained PMVT s/p 150 J x1 followed by amio 300 mg bolus; patient taken to cath lab for IABP placement   - 4/11/19 @ 17:36: recurrent sustained PMVT; transferred to [facility]    Review of Systems   Constitution: Negative for diaphoresis, fever and malaise/fatigue.   Cardiovascular: Negative for chest pain, irregular heartbeat and palpitations.   Respiratory: Negative for shortness of breath.    Neurological: Negative for headaches and light-headedness.   Psychiatric/Behavioral: Negative for altered mental status. The patient is not nervous/anxious.      Objective:     Vital Signs (Most Recent):  Temp: 99 °F (37.2 °C) (04/12/19 0700)  Pulse: 76 (04/12/19 0800)  Resp: (!) 25 (04/12/19 0800)  BP: 115/76 (04/12/19 0800)  SpO2: (!) 93 % (04/12/19 0800) Vital Signs (24h Range):  Temp:  [96.9 °F (36.1 °C)-99.2 °F (37.3 °C)] 99 °F (37.2 °C)  Pulse:  [] 76  Resp:  [15-38] 25  SpO2:  [92 %-100 %] 93 %  BP: ()/(54-80) 115/76     Weight: 105.6 kg (232 " lb 12.9 oz)  Body mass index is 31.57 kg/m².     SpO2: (!) 93 %  O2 Device (Oxygen Therapy): nasal cannula    Physical Exam   Constitutional: He is oriented to person, place, and time. He appears well-developed and well-nourished. No distress.   HENT:   Head: Normocephalic and atraumatic.   Eyes: EOM are normal.   Neck: JVD present.   Cardiovascular: Normal rate, regular rhythm and normal heart sounds.   Pulmonary/Chest: Effort normal. No respiratory distress. He has rales (on anterior auscultation).   Abdominal: Soft. Bowel sounds are normal.   Genitourinary:   Genitourinary Comments: Coleman in place   Musculoskeletal:   L fem IABP in place, c/d/i   Neurological: He is alert and oriented to person, place, and time.   Skin: Skin is warm and dry. No erythema.   Psychiatric: He has a normal mood and affect. Judgment and thought content normal.       Significant Labs: All pertinent lab results from the last 24 hours have been reviewed.    Significant Imaging: Reviewed in EPIC

## 2019-04-12 NOTE — PROCEDURES
"Kristopher Kevin is a 53 y.o. male patient.    Temp: 99 °F (37.2 °C) (04/12/19 1100)  Pulse: 74 (04/12/19 1326)  Resp: (!) 24 (04/12/19 1326)  BP: 96/67 (04/12/19 1300)  SpO2: (!) 94 % (04/12/19 1326)  Weight: 105.6 kg (232 lb 12.9 oz) (04/11/19 2141)  Height: 6' (182.9 cm) (04/11/19 2104)  Mallampati Scale: Class III  ASA Classification: Class 3    Central Line  Date/Time: 4/12/2019 1:56 PM  Location procedure was performed: [facility] CARDIOLOGY  Performed by: Jacobo Saleh MD  Supervising provider: Antoine johnson  Assisting provider: Antoine Johnson MD  Consent Done: Yes  Time out: Immediately prior to procedure a "time out" was called to verify the correct patient, procedure, equipment, support staff and site/side marked as required.  Indications: med administration  Anesthesia: local infiltration    Anesthesia:  Local Anesthetic: lidocaine 1% without epinephrine  Anesthetic total: 10 mL  Preparation: skin prepped with ChloraPrep  Location details: right internal jugular  Catheter type: triple lumen  Catheter size: 7 Fr  Ultrasound guidance: yes  Vessel Caliber: patent, compressibility normal  Vascular Doppler: not done  Needle advanced into vessel with real time Ultrasound guidance.  Guidewire confirmed in vessel.  Image recorded and saved.  Sterile sheath used.  Manometry: Yes  Number of attempts: 1  Specimens: No  Implants: No  Post-procedure: line sutured,  chlorhexidine patch,  sterile dressing applied and blood return through all ports  Complications: No          Jacobo Saleh  4/12/2019  "

## 2019-04-12 NOTE — SUBJECTIVE & OBJECTIVE
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    Microalbuminuria 2/13/2017    Nodular prostate without urinary obstruction 8/14/2012    Tobacco use disorder 8/14/2012       Past Surgical History:   Procedure Laterality Date    CATHETERIZATION, HEART, BOTH LEFT AND RIGHT Bilateral 4/9/2019    Performed by [facility] at [facility] CATH LAB    HERNIA REPAIR      INSERTION, INTRA-AORTIC BALLOON PUMP N/A 4/10/2019    Performed by Adrian [facility] at [facility] CATH LAB    IVUS, Coronary  4/9/2019    Performed by Adrian [facility]o MD at [facility] CATH LAB    Percutaneous coronary intervention N/A 4/9/2019    Performed by Adrian Jacobo MD at [facility] CATH LAB       Review of patient's allergies indicates:  No Known Allergies    No current facility-administered medications on file prior to encounter.      Current Outpatient Medications on File Prior to Encounter   Medication Sig    blood sugar diagnostic Strp 1 strip by Misc.(Non-Drug; Combo Route) route 3 (three) times daily.    blood-glucose meter kit Use as instructed    furosemide (LASIX) 20 MG tablet Take 1 tablet (20 mg total) by mouth daily as needed (shortness of breath).    glimepiride (AMARYL) 4 MG tablet Take 1 tablet (4 mg total) by mouth daily with breakfast.    irbesartan (AVAPRO) 75 MG tablet Take 1 tablet (75 mg total) by mouth once daily.    lancets Misc 1 application by Misc.(Non-Drug; Combo Route) route 3 (three) times daily.     Family History     Problem Relation (Age of Onset)    Diabetes Mother        Tobacco Use    Smoking status: Current Every Day Smoker     Packs/day: 1.50     Types: Cigarettes    Smokeless tobacco: Never Used    Tobacco comment: Quick 3 days ago   Substance and Sexual  Activity    Alcohol use: Yes    Drug use: Never    Sexual activity: Not Currently     Review of Systems   Constitution: Negative.   HENT: Negative.    Eyes: Negative.    Cardiovascular: Positive for dyspnea on exertion, leg swelling, orthopnea and paroxysmal nocturnal dyspnea.   Respiratory: Negative.    Endocrine: Negative.    Hematologic/Lymphatic: Negative.    Skin: Negative.    Musculoskeletal: Negative.    Gastrointestinal: Negative.    Genitourinary: Negative.    Neurological: Negative.    Psychiatric/Behavioral: Negative.      Objective:     Vital Signs (Most Recent):  Temp: 98.7 °F (37.1 °C) (04/11/19 2105)  Pulse: 74 (04/11/19 2138)  Resp: (!) 34 (04/11/19 2138)  BP: 131/66 (04/11/19 2130)  SpO2: 95 % (04/11/19 2138) Vital Signs (24h Range):  Temp:  [96.9 °F (36.1 °C)-99.4 °F (37.4 °C)] 98.7 °F (37.1 °C)  Pulse:  [] 74  Resp:  [15-40] 34  SpO2:  [92 %-99 %] 95 %  BP: ()/(54-84) 131/66     Weight: 105.6 kg (232 lb 12.9 oz)  Body mass index is 31.57 kg/m².    SpO2: 95 %  O2 Device (Oxygen Therapy): nasal cannula      Intake/Output Summary (Last 24 hours) at 4/11/2019 2202  Last data filed at 4/11/2019 1930  Gross per 24 hour   Intake 536.16 ml   Output 1470 ml   Net -933.84 ml       Lines/Drains/Airways     Drain                 Urethral Catheter 04/08/19 0630 Non-latex;Straight-tip 16 Fr. 3 days          Line                 IABP 04/10/19 8.0 Fr. 1 day          Peripheral Intravenous Line                 Peripheral IV - Single Lumen 04/11/19 0800 Posterior;Right Hand less than 1 day         Peripheral IV - Single Lumen 04/11/19 1200 20 G Left;Posterior Forearm less than 1 day                Physical Exam   Constitutional: He is oriented to person, place, and time. He appears well-developed and well-nourished. No distress.   HENT:   Head: Normocephalic and atraumatic.   Neck: No JVD (estimated CVP ~10) present.   Cardiovascular: Normal rate, regular rhythm, normal heart sounds and intact distal  pulses. Exam reveals no gallop and no friction rub.   No murmur heard.  Pulses:       Radial pulses are 2+ on the right side, and 2+ on the left side.        Dorsalis pedis pulses are 2+ on the right side, and 2+ on the left side.   Left femoral IABP in place. Extremities cool to touch   Pulmonary/Chest: Effort normal and breath sounds normal. No respiratory distress. He has no wheezes. He has no rales.   Abdominal: Soft. Bowel sounds are normal. He exhibits no distension. There is no tenderness.   Musculoskeletal: He exhibits no edema.   Neurological: He is alert and oriented to person, place, and time.   Skin: He is not diaphoretic.       Significant Labs:     Recent Results (from the past 24 hour(s))   Comprehensive metabolic panel    Collection Time: 04/11/19  3:14 AM   Result Value Ref Range    Sodium 135 (L) 136 - 145 mmol/L    Potassium 3.8 3.5 - 5.1 mmol/L    Chloride 99 95 - 110 mmol/L    CO2 24 23 - 29 mmol/L    Glucose 139 (H) 70 - 110 mg/dL    BUN, Bld 34 (H) 6 - 20 mg/dL    Creatinine 1.1 0.5 - 1.4 mg/dL    Calcium 9.1 8.7 - 10.5 mg/dL    Total Protein 6.2 6.0 - 8.4 g/dL    Albumin 2.1 (L) 3.5 - 5.2 g/dL    Total Bilirubin 0.7 0.1 - 1.0 mg/dL    Alkaline Phosphatase 74 55 - 135 U/L    AST 34 10 - 40 U/L    ALT 47 (H) 10 - 44 U/L    Anion Gap 12 8 - 16 mmol/L    eGFR if African American >60 >60 mL/min/1.73 m^2    eGFR if non African American >60 >60 mL/min/1.73 m^2   Magnesium    Collection Time: 04/11/19  3:14 AM   Result Value Ref Range    Magnesium 1.9 1.6 - 2.6 mg/dL   Phosphorus    Collection Time: 04/11/19  3:14 AM   Result Value Ref Range    Phosphorus 4.4 2.7 - 4.5 mg/dL   POCT glucose    Collection Time: 04/11/19  6:03 AM   Result Value Ref Range    POCT Glucose 159 (H) 70 - 110 mg/dL   POCT glucose    Collection Time: 04/11/19 11:54 AM   Result Value Ref Range    POCT Glucose 185 (H) 70 - 110 mg/dL   Basic metabolic panel    Collection Time: 04/11/19  5:58 PM   Result Value Ref Range    Sodium  136 136 - 145 mmol/L    Potassium 3.9 3.5 - 5.1 mmol/L    Chloride 98 95 - 110 mmol/L    CO2 28 23 - 29 mmol/L    Glucose 163 (H) 70 - 110 mg/dL    BUN, Bld 33 (H) 6 - 20 mg/dL    Creatinine 1.1 0.5 - 1.4 mg/dL    Calcium 8.8 8.7 - 10.5 mg/dL    Anion Gap 10 8 - 16 mmol/L    eGFR if African American >60 >60 mL/min/1.73 m^2    eGFR if non African American >60 >60 mL/min/1.73 m^2   Magnesium    Collection Time: 04/11/19  5:58 PM   Result Value Ref Range    Magnesium 2.2 1.6 - 2.6 mg/dL   POCT glucose    Collection Time: 04/11/19  6:47 PM   Result Value Ref Range    POCT Glucose 176 (H) 70 - 110 mg/dL         Significant Imaging:     I have reviewed all pertinent imaging studies from the last 24 hours

## 2019-04-12 NOTE — CONSULTS
"[facility]  Cardiac Electrophysiology  Consult Note    Admission Date: 4/8/2019  Code Status: Full Code   Attending Provider: Jordon Aguirre MD  Consulting Provider: Corona Kitchen MD  Principal Problem:NSTEMI (non-ST elevated myocardial infarction)    Inpatient consult to Electrophysiology  Consult performed by: Corona Kitchen MD  Consult ordered by: Corona Kitchen MD        Subjective:     Chief Complaint:  VTach    HPI:   53 year old male with PMH DM2, tobacco use, initially presented with complaints of shortness of breath x 1 week following an episode of chest pain. He presented to the ER at an outside facility with troponin 6.6 where he was admitted with NSTEMI, HTN emergency, respiratory failure and newly diagnosed cardiomyopathy. He also had a brief episode of AF with RVR that converted to NSR following an amiodarone bolus. He was taken to the cath lab and found to have 80% prox LAD lesion s/p PCI with SHIRA. During angiography the patient had catheter induced VT upon entering the LV requiring cardioversion and RHC revealed PCWP 31 and SPAP 56.      On the evening of 4/9/19 the patient was found to be unresponsive, received CPR and defibrillation for pulseless VT. Accelerated idioventricular rhythm was suspected and the patient was started on IV amiodarone. On 4/10/19 the patient experienced VT again requiring two shocks and 3 rounds of chest compressions before ROSC. Rhythm strips present in the chart appear to be polymorphic VT. The patient experienced another VT event requiring defibrillation and was taken to the cath lab for IABP placement.      [place] [facility] the patient is accompanied by his wife. The patient reports that approximately one week prior to admission he developed epigastric discomfort which he felt was "gas pain". This pain occurred 2-3 times, was dull, non-radiating, had no aggravating or alleviating factors, and would resolve within an hour after taking " pepcid and laying down. He had no recurrence of this discomfort, however during the following week the patient developed NYHA III WOODS, orthopnea, PND and peripheral edema. He denied palpitations or syncope. While hospitalized, he does not remember any events leading to any of his defibrillations; he only remembers being shocked and waking up surrounded by hospital staff. At this time the patient is comfortable and reports no symptoms or concerns.        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    Microalbuminuria 2/13/2017    Nodular prostate without urinary obstruction 8/14/2012    Tobacco use disorder 8/14/2012       Past Surgical History:   Procedure Laterality Date    CATHETERIZATION, HEART, BOTH LEFT AND RIGHT Bilateral 4/9/2019    Performed by [facility] at [facility] CATH LAB    HERNIA REPAIR      INSERTION, INTRA-AORTIC BALLOON PUMP N/A 4/10/2019    Performed by Adrian [facility] MD at [facility] CATH LAB    IVUS, Coronary  4/9/2019    Performed by Adrian [facility]o MD at [facility] CATH LAB    Percutaneous coronary intervention N/A 4/9/2019    Performed by Adrian Jacobo MD at [facility] CATH LAB       Review of patient's allergies indicates:  No Known Allergies    No current facility-administered medications on file prior to encounter.      Current Outpatient Medications on File Prior to Encounter   Medication Sig    blood sugar diagnostic Strp 1 strip by Misc.(Non-Drug; Combo Route) route 3 (three) times daily.    blood-glucose meter kit Use as instructed    furosemide (LASIX) 20 MG tablet Take 1 tablet (20 mg total) by mouth daily as needed (shortness of breath).    glimepiride (AMARYL) 4 MG tablet Take 1 tablet (4 mg total) by mouth daily with  breakfast.    irbesartan (AVAPRO) 75 MG tablet Take 1 tablet (75 mg total) by mouth once daily.    lancets Misc 1 application by Misc.(Non-Drug; Combo Route) route 3 (three) times daily.     Family History     Problem Relation (Age of Onset)    Diabetes Mother        Tobacco Use    Smoking status: Current Every Day Smoker     Packs/day: 1.50     Types: Cigarettes    Smokeless tobacco: Never Used    Tobacco comment: Quick 3 days ago   Substance and Sexual Activity    Alcohol use: Yes    Drug use: Never    Sexual activity: Not Currently     Review of Systems   Constitution: Negative.   HENT: Negative.    Eyes: Negative.    Cardiovascular: Positive for dyspnea on exertion, leg swelling, orthopnea and paroxysmal nocturnal dyspnea.   Respiratory: Negative.    Endocrine: Negative.    Hematologic/Lymphatic: Negative.    Skin: Negative.    Musculoskeletal: Negative.    Gastrointestinal: Negative.    Genitourinary: Negative.    Neurological: Negative.    Psychiatric/Behavioral: Negative.      Objective:     Vital Signs (Most Recent):  Temp: 98.7 °F (37.1 °C) (04/11/19 2105)  Pulse: 74 (04/11/19 2138)  Resp: (!) 34 (04/11/19 2138)  BP: 131/66 (04/11/19 2130)  SpO2: 95 % (04/11/19 2138) Vital Signs (24h Range):  Temp:  [96.9 °F (36.1 °C)-99.4 °F (37.4 °C)] 98.7 °F (37.1 °C)  Pulse:  [] 74  Resp:  [15-40] 34  SpO2:  [92 %-99 %] 95 %  BP: ()/(54-84) 131/66       Weight: 105.6 kg (232 lb 12.9 oz)  Body mass index is 31.57 kg/m².    SpO2: 95 %  O2 Device (Oxygen Therapy): nasal cannula    Physical Exam   Constitutional: He is oriented to person, place, and time. He appears well-developed and well-nourished. No distress.   HENT:   Head: Normocephalic and atraumatic.   Neck: No JVD (estimated CVP ~10) present.   Cardiovascular: Normal rate, regular rhythm, normal heart sounds and intact distal pulses. Exam reveals no gallop and no friction rub.   No murmur heard.  Pulses:       Radial pulses are 2+ on the right  side, and 2+ on the left side.        Dorsalis pedis pulses are 2+ on the right side, and 2+ on the left side.   Left femoral IABP in place. Extremities cool to touch   Pulmonary/Chest: Effort normal and breath sounds normal. No respiratory distress. He has no wheezes. He has no rales.   Abdominal: Soft. Bowel sounds are normal. He exhibits no distension. There is no tenderness.   Musculoskeletal: He exhibits no edema.   Neurological: He is alert and oriented to person, place, and time.   Skin: He is not diaphoretic.       Significant Labs:     Recent Results (from the past 24 hour(s))   Comprehensive metabolic panel    Collection Time: 04/11/19  3:14 AM   Result Value Ref Range    Sodium 135 (L) 136 - 145 mmol/L    Potassium 3.8 3.5 - 5.1 mmol/L    Chloride 99 95 - 110 mmol/L    CO2 24 23 - 29 mmol/L    Glucose 139 (H) 70 - 110 mg/dL    BUN, Bld 34 (H) 6 - 20 mg/dL    Creatinine 1.1 0.5 - 1.4 mg/dL    Calcium 9.1 8.7 - 10.5 mg/dL    Total Protein 6.2 6.0 - 8.4 g/dL    Albumin 2.1 (L) 3.5 - 5.2 g/dL    Total Bilirubin 0.7 0.1 - 1.0 mg/dL    Alkaline Phosphatase 74 55 - 135 U/L    AST 34 10 - 40 U/L    ALT 47 (H) 10 - 44 U/L    Anion Gap 12 8 - 16 mmol/L    eGFR if African American >60 >60 mL/min/1.73 m^2    eGFR if non African American >60 >60 mL/min/1.73 m^2   Magnesium    Collection Time: 04/11/19  3:14 AM   Result Value Ref Range    Magnesium 1.9 1.6 - 2.6 mg/dL   Phosphorus    Collection Time: 04/11/19  3:14 AM   Result Value Ref Range    Phosphorus 4.4 2.7 - 4.5 mg/dL   POCT glucose    Collection Time: 04/11/19  6:03 AM   Result Value Ref Range    POCT Glucose 159 (H) 70 - 110 mg/dL   POCT glucose    Collection Time: 04/11/19 11:54 AM   Result Value Ref Range    POCT Glucose 185 (H) 70 - 110 mg/dL   Basic metabolic panel    Collection Time: 04/11/19  5:58 PM   Result Value Ref Range    Sodium 136 136 - 145 mmol/L    Potassium 3.9 3.5 - 5.1 mmol/L    Chloride 98 95 - 110 mmol/L    CO2 28 23 - 29 mmol/L     Glucose 163 (H) 70 - 110 mg/dL    BUN, Bld 33 (H) 6 - 20 mg/dL    Creatinine 1.1 0.5 - 1.4 mg/dL    Calcium 8.8 8.7 - 10.5 mg/dL    Anion Gap 10 8 - 16 mmol/L    eGFR if African American >60 >60 mL/min/1.73 m^2    eGFR if non African American >60 >60 mL/min/1.73 m^2   Magnesium    Collection Time: 04/11/19  5:58 PM   Result Value Ref Range    Magnesium 2.2 1.6 - 2.6 mg/dL   POCT glucose    Collection Time: 04/11/19  6:47 PM   Result Value Ref Range    POCT Glucose 176 (H) 70 - 110 mg/dL         Significant Imaging:     I have reviewed all pertinent imaging studies from the last 24 hours                Assessment and Plan:     VT (ventricular tachycardia)  Tracings in paper chart appear consistent with polymorphic VT  Continue IV amiodarone infusion 1 mg/min  Start IV lidocaine 1 mg/min  After 6 hours of IV lidocaine infusion, reduce IV Amiodarone dose to 0.5 mg/min  Cardiac monitoring  Pacer pads on patient  Monitor/replete electrolytes  K > 4, Mg > 2          Thank you for your consult.     Corona Kitchen MD  Cardiac Electrophysiology  [facility]

## 2019-04-12 NOTE — CONSULTS
[facility]  Interventional Cardiology  Consult Note    Patient Name: Kristopher Kevin  MRN: [id number]  Admission Date: 4/8/2019  Hospital Length of Stay: 4 days  Code Status: Full Code   Attending Provider: Yovany Macdonald MD  Consulting Provider: Stanton Butler MD  Primary Care Physician: Jhoan Lundy MD  Principal Problem:NSTEMI (non-ST elevated myocardial infarction)    Patient information was obtained from patient and past medical records.     Inpatient consult to Interventional Cardiology  Consult performed by: Stanton Butler MD  Consult ordered by: Corona Kitchen MD  Reason for consult: Polymorphic VT        Subjective:     Chief Complaint:  VT     HPI:  53 year old male with PMH DM2, tobacco use, initially presented with complaints of shortness of breath x 1 week following an episode of chest pain at [facility] with troponin 6.6 where he was admitted with NSTEMI, HTN emergency, respiratory failure and newly diagnosed cardiomyopathy. He also had a brief episode of AF with RVR that converted to NSR following an amiodarone bolus. He was taken to the cath lab and found to have 80% prox LAD lesion s/p PCI with SHIRA. During angiography the patient had catheter induced VT upon entering the LV requiring cardioversion and RHC revealed PCWP 31 and SPAP 56.      On the evening of 4/9/19 the patient was found to be unresponsive, received CPR and defibrillation for pulseless VT. Accelerated idioventricular rhythm was suspected and the patient was started on IV amiodarone. On 4/10/19 the patient experienced VT again requiring two shocks and 3 rounds of chest compressions before ROSC. Rhythm strips present in the chart appear to be polymorphic VT. The patient experienced another VT event requiring defibrillation and was taken to the cath lab for IABP placement.      Since IABP placement patient has not had further episodes of VT.He has been started on amiodarone and lidocaine. Interventional  cardiology consulted for coronary angiogram.      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    Microalbuminuria 2/13/2017    Nodular prostate without urinary obstruction 8/14/2012    Tobacco use disorder 8/14/2012       Past Surgical History:   Procedure Laterality Date    CATHETERIZATION, HEART, BOTH LEFT AND RIGHT Bilateral 4/9/2019    Performed by [facility] at [facility] CATH LAB    HERNIA REPAIR      INSERTION, INTRA-AORTIC BALLOON PUMP N/A 4/10/2019    [facility]formed by [facility] at [facility] CATH LAB    IVUS, Coronary  4/9/2019    [facility]formed by [facility] at [facility] CATH LAB    Percutaneous coronary intervention N/A 4/9/2019    Performed by [facility] at [facility] CATH LAB       Review of patient's allergies indicates:  No Known Allergies    PTA Medications   Medication Sig    blood sugar diagnostic Strp 1 strip by Misc.(Non-Drug; Combo Route) route 3 (three) times daily.    blood-glucose meter kit Use as instructed    furosemide (LASIX) 20 MG tablet Take 1 tablet (20 mg total) by mouth daily as needed (shortness of breath).    glimepiride (AMARYL) 4 MG tablet Take 1 tablet (4 mg total) by mouth daily with breakfast.    irbesartan (AVAPRO) 75 MG tablet Take 1 tablet (75 mg total) by mouth once daily.    lancets Misc 1 application by Misc.(Non-Drug; Combo Route) route 3 (three) times daily.     Family History     Problem Relation (Age of Onset)    Diabetes Mother        Tobacco Use    Smoking status: Current Every Day Smoker     Packs/day: 1.50     Types: Cigarettes    Smokeless tobacco: Never Used    Tobacco comment: Quick 3 days ago   Substance and Sexual Activity    Alcohol use: Yes    Drug use: Never    Sexual activity:  Not Currently     Review of Systems   All other systems reviewed and are negative.    Objective:     Vital Signs (Most Recent):  Temp: 99 °F (37.2 °C) (04/12/19 0700)  Pulse: 76 (04/12/19 0800)  Resp: (!) 25 (04/12/19 0800)  BP: 115/76 (04/12/19 0800)  SpO2: (!) 93 % (04/12/19 0800) Vital Signs (24h Range):  Temp:  [96.9 °F (36.1 °C)-99.2 °F (37.3 °C)] 99 °F (37.2 °C)  Pulse:  [] 76  Resp:  [15-38] 25  SpO2:  [92 %-100 %] 93 %  BP: ()/(54-80) 115/76     Weight: 105.6 kg (232 lb 12.9 oz)  Body mass index is 31.57 kg/m².    SpO2: (!) 93 %  O2 Device (Oxygen Therapy): nasal cannula      Intake/Output Summary (Last 24 hours) at 4/12/2019 0955  Last data filed at 4/12/2019 0900  Gross per 24 hour   Intake 778.23 ml   Output 1420 ml   Net -641.77 ml       Lines/Drains/Airways     Drain                 Urethral Catheter 04/08/19 0630 Non-latex;Straight-tip 16 Fr. 4 days          Line                 IABP 04/10/19 8.0 Fr. 2 days          Peripheral Intravenous Line                 Peripheral IV - Single Lumen 04/11/19 0800 Posterior;Right Hand 1 day         Peripheral IV - Single Lumen 04/11/19 1200 20 G Left;Posterior Forearm less than 1 day                Physical Exam  General: alert, awake and oriented x 3  Eyes:PERRL.   Neck:no JVD   Lungs:  clear to auscultation bilaterally   Cardiovascular: Heart: regular rate and rhythm, S1, S2 normal, no murmur, click, rub or gallop.   Chest Wall: no tenderness.   Extremities: no cyanosis or edema.   Abdomen/Rectal: Abdomen: soft, non-tender non-distented; bowel sounds normal  Neurologic: Normal strength and tone. No focal numbness or weakness     LEFT RIGHT   RADIAL 2+ 2+   ULNAR     BRACHIAL     FEMORAL IABp in place 2+   DP 1+ 2+   TP Doppler + Doppler +   ROBBIE'S TEST Normal Normal   BARBEAU TEST         Significant Labs:   CMP   Recent Labs   Lab 04/11/19  0314 04/11/19  1758 04/11/19  2218 04/12/19  0346   * 136 135* 135*  135*   K 3.8 3.9 3.7 3.7  3.7    CL 99 98 98 97  97   CO2 24 28 26 27  27   * 163* 164* 169*  169*   BUN 34* 33* 33* 32*  32*   CREATININE 1.1 1.1 1.0 1.1  1.1   CALCIUM 9.1 8.8 9.0 9.0  9.0   PROT 6.2  --  6.5 6.6  6.6   ALBUMIN 2.1*  --  2.1* 2.2*  2.2*   BILITOT 0.7  --  1.0 0.9  0.9   ALKPHOS 74  --  87 90  90   AST 34  --  41* 43*  43*   ALT 47*  --  49* 45*  45*   ANIONGAP 12 10 11 11  11   ESTGFRAFRICA >60 >60 >60.0 >60.0  >60.0   EGFRNONAA >60 >60 >60.0 >60.0  >60.0   , CBC   Recent Labs   Lab 04/10/19  1406 04/11/19  2218 04/12/19  0346   WBC 7.29 12.75*  12.75* 11.16  11.16   HGB 12.7* 11.8*  11.8* 11.3*  11.3*   HCT 38.8* 35.6*  35.6* 34.7*  34.7*   * 474*  474* 477*  477*    and INR   Recent Labs   Lab 04/11/19 2218   INR 1.1  1.1       Significant Imaging: Echocardiogram:   2D echo with color flow doppler: No results found for this or any previous visit. and Transthoracic echo (TTE) complete (Cupid Only):   Results for orders placed or performed during the hospital encounter of 04/08/19   Transthoracic echo (TTE) 2D with Color Flow   Result Value Ref Range    BSA 2.34 m2    TDI SEPTAL 0.04     LV LATERAL E/E' RATIO 13.38     LV SEPTAL E/E' RATIO 26.75     LA WIDTH 4.34 cm    AORTIC VALVE CUSP SEPERATION 2.58 cm    TDI LATERAL 0.08     PV PEAK VELOCITY 1.09 cm/s    LVIDD 4.70 3.5 - 6.0 cm    IVS 1.51 (A) 0.6 - 1.1 cm    PW 1.45 (A) 0.6 - 1.1 cm    Ao root annulus 3.58 cm    LVIDS 3.98 2.1 - 4.0 cm    FS 15 28 - 44 %    LA volume 59.68 cm3    Sinus 3.23 cm    STJ 2.78 cm    Ascending aorta 3.04 cm    LV mass 288.17 g    LA size 2.94 cm    RVDD 4.27 cm    TAPSE 2.00 cm    RV S' 16.79 m/s    Left Ventricle Relative Wall Thickness 0.62 cm    AV mean gradient 4.42 mmHg    AV valve area 2.99 cm2    AV Velocity Ratio 0.98     AV index (prosthetic) 0.92     E/A ratio 2.02     Mean e' 0.06     E wave decelartion time 151.03 msec    IVRT 0.10 msec    Pulm vein S/D ratio 0.69     LVOT diameter 2.03 cm     LVOT area 3.23 cm2    LVOT peak shree [id number] m/s    LVOT peak VTI 22.12 cm    Ao peak shree 1.23 m/s    Ao VTI 23.93 cm    LVOT stroke volume 71.56 cm3    AV peak gradient 6.05 mmHg    E/E' ratio 17.83     MV Peak E Shree 1.07 m/s    TR Max Shree 3.06 m/s    MV Peak A Shree 0.53 m/s    PV Peak S Shree 0.44 m/s    PV Peak D Shree 0.64 m/s    LV Systolic Volume 69.32 mL    LV Systolic Volume Index 30.4 mL/m2    LV Diastolic Volume 102.41 mL    LV Diastolic Volume Index 44.86 mL/m2    LA Volume Index 26.1 mL/m2    LV Mass Index 126.2 g/m2    RA Major Axis 4.58 cm    Left Atrium Minor Axis 5.39 cm    Left Atrium Major Axis 5.62 cm    Triscuspid Valve Regurgitation Peak Gradient 37.45 mmHg    RA Width 4.30 cm    Right Atrial Pressure (from IVC) 3 mmHg    TV rest pulmonary artery pressure 40 mmHg     Assessment and Plan:     * NSTEMI (non-ST elevated myocardial infarction)    -- Relook [facility] for recent NSTEMI  --[facility] +/- PCI, patient is a SHIRA candidate  - Anti-platelet Therapy:Aspirin and plavix  - Access:Right radial   - Creatinine: 1.1  - Allergies: NKDA  - Pre-Op Med:Benadryl  - All patient's questions were answered.  -The risks, benefits and alternatives of the procedure were explained to the patient.   -The risks of coronary angiography include but are not limited to: bleeding, infection, heart rhythm abnormalities, allergic reactions, kidney injury and potential need for dialysis, stroke and death.   - Should stenting be indicated, the patient has agreed to dual anti-platelet therapy for 1-consecutive year with a drug-eluting stent and a minimum of 1-month with the use of a bare metal stent  - Additionally, pt is aware that non-compliance is likely to result in stent clotting with heart attack, heart failure, and/or death  -The risks of moderate sedation include hypotension, respiratory depression, arrhythmias, bronchospasm, and death.   - Informed consent was obtained and the  patient is agreeable to proceed with the  procedure.                    VTE Risk Mitigation (From admission, onward)        Ordered     heparin 25,000 units in dextrose 5% 250 ml (100 units/mL) infusion MINIMAL INTENSITY nomogram - [facility]  Continuous      04/11/19 2143     Reason for no Mechanical VTE Prophylaxis  Once      04/11/19 2143     IP VTE HIGH RISK PATIENT  Once      04/11/19 2143     Place NANCY hose  Until discontinued      04/08/19 0519     Place sequential compression device  Until discontinued      04/08/19 0519          Thank you for your consult. I will follow-up with patient. Please contact us if you have any additional questions.    Stanton Butler MD  Interventional Cardiology   [facility]

## 2019-04-12 NOTE — PROGRESS NOTES
04/12/2019  Perfusionist:  Gabino Valdovinos  Surgeon(s) and Role:     * Adrian Jacobo MD - Primary  No responsible provider has been recorded for the case.  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      At the end of the transfer from [facility], the current device settings and alarms were verified to be accurate.  The patient was transported to CMICU post operatively, back-up equipment was either delivered to the patients room or verified by the bedside nurse, and report given.  There were no issues.    4:39 AM

## 2019-04-12 NOTE — ASSESSMENT & PLAN NOTE
-- Relook [facility] for recent NSTEMI  --[facility] +/- PCI, patient is a SHIRA candidate  - Anti-platelet Therapy:Aspirin and plavix  - Access:Right radial   - Creatinine: 1.1  - Allergies: NKDA  - Pre-Op Med:Benadryl  - All patient's questions were answered.  -The risks, benefits and alternatives of the procedure were explained to the patient.   -The risks of coronary angiography include but are not limited to: bleeding, infection, heart rhythm abnormalities, allergic reactions, kidney injury and potential need for dialysis, stroke and death.   - Should stenting be indicated, the patient has agreed to dual anti-platelet therapy for 1-consecutive year with a drug-eluting stent and a minimum of 1-month with the use of a bare metal stent  - Additionally, pt is aware that non-compliance is likely to result in stent clotting with heart attack, heart failure, and/or death  -The risks of moderate sedation include hypotension, respiratory depression, arrhythmias, bronchospasm, and death.   - Informed consent was obtained and the  patient is agreeable to proceed with the procedure.

## 2019-04-12 NOTE — SUBJECTIVE & OBJECTIVE
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    Microalbuminuria 2/13/2017    Nodular prostate without urinary obstruction 8/14/2012    Tobacco use disorder 8/14/2012       Past Surgical History:   Procedure Laterality Date    CATHETERIZATION, HEART, BOTH LEFT AND RIGHT Bilateral 4/9/2019    [facility] CATH LAB    HERNIA REPAIR      INSERTION, INTRA-AORTIC BALLOON PUMP N/A 4/10/2019    Performed by [facility] at [facility] CATH LAB    IVUS, Coronary  4/9/2019    Performed by [facility] at [facility] CATH LAB    Percutaneous coronary intervention N/A 4/9/2019    Performed by [facility] at [facility] CATH LAB       Review of patient's allergies indicates:  No Known Allergies    PTA Medications   Medication Sig    blood sugar diagnostic Strp 1 strip by Misc.(Non-Drug; Combo Route) route 3 (three) times daily.    blood-glucose meter kit Use as instructed    furosemide (LASIX) 20 MG tablet Take 1 tablet (20 mg total) by mouth daily as needed (shortness of breath).    glimepiride (AMARYL) 4 MG tablet Take 1 tablet (4 mg total) by mouth daily with breakfast.    irbesartan (AVAPRO) 75 MG tablet Take 1 tablet (75 mg total) by mouth once daily.    lancets Misc 1 application by Misc.(Non-Drug; Combo Route) route 3 (three) times daily.     Family History     Problem Relation (Age of Onset)    Diabetes Mother        Tobacco Use    Smoking status: Current Every Day Smoker     Packs/day: 1.50     Types: Cigarettes    Smokeless tobacco: Never Used    Tobacco comment: Quick 3 days ago   Substance and Sexual Activity    Alcohol use: Yes    Drug use: Never    Sexual activity: Not Currently     Review of Systems   All other  systems reviewed and are negative.    Objective:     Vital Signs (Most Recent):  Temp: 99 °F (37.2 °C) (04/12/19 0700)  Pulse: 76 (04/12/19 0800)  Resp: (!) 25 (04/12/19 0800)  BP: 115/76 (04/12/19 0800)  SpO2: (!) 93 % (04/12/19 0800) Vital Signs (24h Range):  Temp:  [96.9 °F (36.1 °C)-99.2 °F (37.3 °C)] 99 °F (37.2 °C)  Pulse:  [] 76  Resp:  [15-38] 25  SpO2:  [92 %-100 %] 93 %  BP: ()/(54-80) 115/76     Weight: 105.6 kg (232 lb 12.9 oz)  Body mass index is 31.57 kg/m².    SpO2: (!) 93 %  O2 Device (Oxygen Therapy): nasal cannula      Intake/Output Summary (Last 24 hours) at 4/12/2019 0955  Last data filed at 4/12/2019 0900  Gross per 24 hour   Intake 778.23 ml   Output 1420 ml   Net -641.77 ml       Lines/Drains/Airways     Drain                 Urethral Catheter 04/08/19 0630 Non-latex;Straight-tip 16 Fr. 4 days          Line                 IABP 04/10/19 8.0 Fr. 2 days          Peripheral Intravenous Line                 Peripheral IV - Single Lumen 04/11/19 0800 Posterior;Right Hand 1 day         Peripheral IV - Single Lumen 04/11/19 1200 20 G Left;Posterior Forearm less than 1 day                Physical Exam  General: alert, awake and oriented x 3  Eyes:PERRL.   Neck:no JVD   Lungs:  clear to auscultation bilaterally   Cardiovascular: Heart: regular rate and rhythm, S1, S2 normal, no murmur, click, rub or gallop.   Chest Wall: no tenderness.   Extremities: no cyanosis or edema.   Abdomen/Rectal: Abdomen: soft, non-tender non-distented; bowel sounds normal  Neurologic: Normal strength and tone. No focal numbness or weakness     LEFT RIGHT   RADIAL 2+ 2+   ULNAR     BRACHIAL     FEMORAL IABp in place 2+   DP 1+ 2+   TP Doppler + Doppler +   ROBBIE'S TEST Normal Normal   BARBEAU TEST         Significant Labs:   CMP   Recent Labs   Lab 04/11/19  0314 04/11/19  1758 04/11/19  2218 04/12/19  0346   * 136 135* 135*  135*   K 3.8 3.9 3.7 3.7  3.7   CL 99 98 98 97  97   CO2 24 28 26 27  27   GLU  139* 163* 164* 169*  169*   BUN 34* 33* 33* 32*  32*   CREATININE 1.1 1.1 1.0 1.1  1.1   CALCIUM 9.1 8.8 9.0 9.0  9.0   PROT 6.2  --  6.5 6.6  6.6   ALBUMIN 2.1*  --  2.1* 2.2*  2.2*   BILITOT 0.7  --  1.0 0.9  0.9   ALKPHOS 74  --  87 90  90   AST 34  --  41* 43*  43*   ALT 47*  --  49* 45*  45*   ANIONGAP 12 10 11 11  11   ESTGFRAFRICA >60 >60 >60.0 >60.0  >60.0   EGFRNONAA >60 >60 >60.0 >60.0  >60.0   , CBC   Recent Labs   Lab 04/10/19  1406 04/11/19  2218 04/12/19  0346   WBC 7.29 12.75*  12.75* 11.16  11.16   HGB 12.7* 11.8*  11.8* 11.3*  11.3*   HCT 38.8* 35.6*  35.6* 34.7*  34.7*   * 474*  474* 477*  477*    and INR   Recent Labs   Lab 04/11/19 2218   INR 1.1  1.1       Significant Imaging: Echocardiogram:   2D echo with color flow doppler: No results found for this or any previous visit. and Transthoracic echo (TTE) complete (Cupid Only):   Results for orders placed or performed during the hospital encounter of 04/08/19   Transthoracic echo (TTE) 2D with Color Flow   Result Value Ref Range    BSA 2.34 m2    TDI SEPTAL 0.04     LV LATERAL E/E' RATIO 13.38     LV SEPTAL E/E' RATIO 26.75     LA WIDTH 4.34 cm    AORTIC VALVE CUSP SEPERATION 2.58 cm    TDI LATERAL 0.08     PV PEAK VELOCITY 1.09 cm/s    LVIDD 4.70 3.5 - 6.0 cm    IVS 1.51 (A) 0.6 - 1.1 cm    PW 1.45 (A) 0.6 - 1.1 cm    Ao root annulus 3.58 cm    LVIDS 3.98 2.1 - 4.0 cm    FS 15 28 - 44 %    LA volume 59.68 cm3    Sinus 3.23 cm    STJ 2.78 cm    Ascending aorta 3.04 cm    LV mass 288.17 g    LA size 2.94 cm    RVDD 4.27 cm    TAPSE 2.00 cm    RV S' 16.79 m/s    Left Ventricle Relative Wall Thickness 0.62 cm    AV mean gradient 4.42 mmHg    AV valve area 2.99 cm2    AV Velocity Ratio 0.98     AV index (prosthetic) 0.92     E/A ratio 2.02     Mean e' 0.06     E wave decelartion time 151.03 msec    IVRT 0.10 msec    Pulm vein S/D ratio 0.69     LVOT diameter 2.03 cm    LVOT area 3.23 cm2    LVOT peak artur [id number] m/s     LVOT peak VTI 22.12 cm    Ao peak shree 1.23 m/s    Ao VTI 23.93 cm    LVOT stroke volume 71.56 cm3    AV peak gradient 6.05 mmHg    E/E' ratio 17.83     MV Peak E Shree 1.07 m/s    TR Max Shree 3.06 m/s    MV Peak A Shree 0.53 m/s    PV Peak S Shree 0.44 m/s    PV Peak D Shree 0.64 m/s    LV Systolic Volume 69.32 mL    LV Systolic Volume Index 30.4 mL/m2    LV Diastolic Volume 102.41 mL    LV Diastolic Volume Index 44.86 mL/m2    LA Volume Index 26.1 mL/m2    LV Mass Index 126.2 g/m2    RA Major Axis 4.58 cm    Left Atrium Minor Axis 5.39 cm    Left Atrium Major Axis 5.62 cm    Triscuspid Valve Regurgitation Peak Gradient 37.45 mmHg    RA Width 4.30 cm    Right Atrial Pressure (from IVC) 3 mmHg    TV rest pulmonary artery pressure 40 mmHg

## 2019-04-12 NOTE — PROGRESS NOTES
"[facility]  Cardiac Electrophysiology  Progress Note    Admission Date: 4/8/2019  Code Status: Full Code   Attending Physician: Yovany Macdonald MD   Expected Discharge Date:   Principal Problem:NSTEMI (non-ST elevated myocardial infarction)    Subjective:     Interval History: Patient admitted from OSH overnight for management of late presenting NSTEMI s/p PCI and recurrent PMVT. See details below regarding course of events prior to transfer.    Patient examined at bedside this morning. Currently asymptomatic and no complaints. Index anginal symptoms reported to be "gas type discomfort in my chest" per patient which started one week prior to admission to OSH. He is an uncontrolled diabetic (A1c 9.1) with hypertension and active tobacco use (1 ppd x 30 years).     Telemetry reviewed with no episodes of ventricular arrhthymias overnight. Currently SR 70s.      Brief timeline at OSH:  - 4/8/19: patient presented to ED with c/o SOB, CP x 1 week found to have NSTEMI (trop > 6.6)  - 4/9/19: taken for urgent [facility] s/p PCI/SHIRA to LAD (80% lesion); had catheter induced VT upon entering the LV s/p shock   - 4/10/19 @ 4:08: patient went into sustained PMVT s/p 200 J x1 followed by amio bolus and infusion   - 4/10/19 @ 13:49: recurrent episode of sustained PMVT s/p 150 J x1 followed by amio 300 mg bolus; patient taken to cath lab for IABP placement   - 4/11/19 @ 17:36: recurrent sustained PMVT; transferred to [facility]    Review of Systems   Constitution: Negative for diaphoresis, fever and malaise/fatigue.   Cardiovascular: Negative for chest pain, irregular heartbeat and palpitations.   Respiratory: Negative for shortness of breath.    Neurological: Negative for headaches and light-headedness.   Psychiatric/Behavioral: Negative for altered mental status. The patient is not nervous/anxious.      Objective:     Vital Signs (Most Recent):  Temp: 99 °F (37.2 °C) (04/12/19 0700)  Pulse: 76 (04/12/19 0800)  Resp: " (!) 25 (04/12/19 0800)  BP: 115/76 (04/12/19 0800)  SpO2: (!) 93 % (04/12/19 0800) Vital Signs (24h Range):  Temp:  [96.9 °F (36.1 °C)-99.2 °F (37.3 °C)] 99 °F (37.2 °C)  Pulse:  [] 76  Resp:  [15-38] 25  SpO2:  [92 %-100 %] 93 %  BP: ()/(54-80) 115/76     Weight: 105.6 kg (232 lb 12.9 oz)  Body mass index is 31.57 kg/m².     SpO2: (!) 93 %  O2 Device (Oxygen Therapy): nasal cannula    Physical Exam   Constitutional: He is oriented to person, place, and time. He appears well-developed and well-nourished. No distress.   HENT:   Head: Normocephalic and atraumatic.   Eyes: EOM are normal.   Neck: JVD present.   Cardiovascular: Normal rate, regular rhythm and normal heart sounds.   Pulmonary/Chest: Effort normal. No respiratory distress. He has rales (on anterior auscultation).   Abdominal: Soft. Bowel sounds are normal.   Genitourinary:   Genitourinary Comments: Coleman in place   Musculoskeletal:   L fem IABP in place, c/d/i   Neurological: He is alert and oriented to person, place, and time.   Skin: Skin is warm and dry. No erythema.   Psychiatric: He has a normal mood and affect. Judgment and thought content normal.       Significant Labs: All pertinent lab results from the last 24 hours have been reviewed.    Significant Imaging: Reviewed in EPIC    Assessment and Plan:     VT (ventricular tachycardia)  52 yo M with PMHx DM2 (A1c 9.1), HTN, DLD, active smoking (30 py) admitted to OSH on 4/8 with late presenting MI (trop > 6.6) s/p C demonstrating 80% LAD lesion s/p PCI/SHIRA. Post procedural course c/b recurrent polymorphic VT s/p multiple shocks, amio bolus/ infusion, and ultimately IABP  placement on 4/10. [facility] [facility] [place]11 for higher level of care due to refractory PMVT.    Echo 4/8: EF 30%.    EKG 4/10: NSR 77 bpm, Q-waves in anterolateral leads, QTc 450 ms.      Recommendations:  -- Agree with repeating LHC to ensure patient is fully revascularized / stent patent, etc.   -- Left ventricular  unloading and management of cardiogenic shock per primary team.  -- Continue with lidocaine 1 mg/min and amiodarone 0.5 mg/min for now.  -- Monitor and replete electrolytes, K>4, Mg>2.  -- Will need LifeVest on discharge.     EP will continue to follow with you.    Amy Womack MD  Cardiac Electrophysiology   [facility]

## 2019-04-12 NOTE — ASSESSMENT & PLAN NOTE
IABP 1:1  Minimal intensity heparin infusion  Daily CXR to monitor IABP positioning  Continue Lasix 40 IV BID  Net - 4.4 L  Daily weight, strict I/O, fluid and sodium restriction

## 2019-04-12 NOTE — HPI
"53 year old male with PMH DM2, tobacco use, initially presented with complaints of shortness of breath x 1 week following an episode of chest pain. He presented to the ER at an outside facility with troponin 6.6 where he was admitted with NSTEMI, HTN emergency, respiratory failure and newly diagnosed cardiomyopathy. He also had a brief episode of AF with RVR that converted to NSR following an amiodarone bolus. He was taken to the cath lab and found to have 80% prox LAD lesion s/p PCI with SHIRA. During angiography the patient had catheter induced VT upon entering the LV requiring cardioversion and RHC revealed PCWP 31 and SPAP 56.      On the evening of 4/9/19 the patient was found to be unresponsive, received CPR and defibrillation for pulseless VT. Accelerated idioventricular rhythm was suspected and the patient was started on IV amiodarone. On 4/10/19 the patient experienced VT again requiring two shocks and 3 rounds of chest compressions before ROSC. Rhythm strips present in the chart appear to be polymorphic VT. The patient experienced another VT event requiring defibrillation and was taken to the cath lab for IABP placement.      Upon admission at [facility], the patient is accompanied by his wife. The patient reports that approximately one week prior to admission he developed epigastric discomfort which he felt was "gas pain". This pain occurred 2-3 times, was dull, non-radiating, had no aggravating or alleviating factors, and would resolve within an hour after taking pepcid and laying down. He had no recurrence of this discomfort, however during the following week the patient developed NYHA III WOODS, orthopnea, PND and peripheral edema. He denied palpitations or syncope. While hospitalized, he does not remember any events leading to any of his defibrillations; he only remembers being shocked and waking up surrounded by hospital staff. At this time the patient is comfortable and reports no symptoms or concerns.      "

## 2019-04-12 NOTE — H&P
"[facility]  Cardiology  History and Physical     Patient Name: Kristopher Kevin  MRN: [id number]  Admission Date: 4/8/2019  Code Status: Full Code   Attending Provider: Jordon Aguirre MD   Primary Care Physician: Jhoan Lundy MD  Principal Problem:NSTEMI (non-ST elevated myocardial infarction)    Patient information was obtained from patient and ER records.     Subjective:     Chief Complaint:  NSTEMI     HPI:  53 year old male with PMH DM2, tobacco use, initially presented with complaints of shortness of breath x 1 week following an episode of chest pain. He presented to the ER at an outside facility with troponin 6.6 where he was admitted with NSTEMI, HTN emergency, respiratory failure and newly diagnosed cardiomyopathy. He also had a brief episode of AF with RVR that converted to NSR following an amiodarone bolus. He was taken to the cath lab and found to have 80% prox LAD lesion s/p PCI with SHIRA. During angiography the patient had catheter induced VT upon entering the LV requiring cardioversion and RHC revealed PCWP 31 and SPAP 56.     On the evening of 4/9/19 the patient was found to be unresponsive, received CPR and defibrillation for pulseless VT. Accelerated idioventricular rhythm was suspected and the patient was started on IV amiodarone. On 4/10/19 the patient experienced VT again requiring two shocks and 3 rounds of chest compressions before ROSC. Rhythm strips present in the chart appear to be polymorphic VT. The patient experienced another VT event requiring defibrillation and was taken to the cath lab for IABP placement.     Upon admission at [facility], the patient is accompanied by his wife. The patient reports that approximately one week prior to admission he developed epigastric discomfort which he felt was "gas pain". This pain occurred 2-3 times, was dull, non-radiating, had no aggravating or alleviating factors, and would resolve within an hour after taking pepcid and laying " down. He had no recurrence of this discomfort, however during the following week the patient developed NYHA III WOODS, orthopnea, PND and peripheral edema. He denied palpitations or syncope. While hospitalized, he does not remember any events leading to any of his defibrillations; he only remembers being shocked and waking up surrounded by hospital staff. At this time the patient is comfortable and reports no symptoms or concerns.    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    Microalbuminuria 2/13/2017    Nodular prostate without urinary obstruction 8/14/2012    Tobacco use disorder 8/14/2012       Past Surgical History:   Procedure Laterality Date    CATHETERIZATION, HEART, BOTH LEFT AND RIGHT Bilateral 4/9/2019    Performed by [facility] at [facility] CATH LAB    HERNIA REPAIR      INSERTION, INTRA-AORTIC BALLOON PUMP N/A 4/10/2019    Performed by Adrian Jacobo MD at [facility] CATH LAB    IVUS, Coronary  4/9/2019    Performed by Adrian Jacobo MD at [facility] CATH LAB    Percutaneous coronary intervention N/A 4/9/2019    Performed by Adrian Jacobo MD at [facility] CATH LAB       Review of patient's allergies indicates:  No Known Allergies    No current facility-administered medications on file prior to encounter.      Current Outpatient Medications on File Prior to Encounter   Medication Sig    blood sugar diagnostic Strp 1 strip by Misc.(Non-Drug; Combo Route) route 3 (three) times daily.    blood-glucose meter kit Use as instructed    furosemide (LASIX) 20 MG tablet Take 1 tablet (20 mg total) by mouth daily as needed (shortness of breath).    glimepiride (AMARYL) 4 MG tablet Take 1 tablet (4 mg total) by mouth daily with breakfast.    irbesartan  (AVAPRO) 75 MG tablet Take 1 tablet (75 mg total) by mouth once daily.    lancets Misc 1 application by Misc.(Non-Drug; Combo Route) route 3 (three) times daily.     Family History     Problem Relation (Age of Onset)    Diabetes Mother        Tobacco Use    Smoking status: Current Every Day Smoker     Packs/day: 1.50     Types: Cigarettes    Smokeless tobacco: Never Used    Tobacco comment: Quick 3 days ago   Substance and Sexual Activity    Alcohol use: Yes    Drug use: Never    Sexual activity: Not Currently     Review of Systems   Constitution: Negative.   HENT: Negative.    Eyes: Negative.    Cardiovascular: Positive for dyspnea on exertion, leg swelling, orthopnea and paroxysmal nocturnal dyspnea.   Respiratory: Negative.    Endocrine: Negative.    Hematologic/Lymphatic: Negative.    Skin: Negative.    Musculoskeletal: Negative.    Gastrointestinal: Negative.    Genitourinary: Negative.    Neurological: Negative.    Psychiatric/Behavioral: Negative.      Objective:     Vital Signs (Most Recent):  Temp: 98.7 °F (37.1 °C) (04/11/19 2105)  Pulse: 74 (04/11/19 2138)  Resp: (!) 34 (04/11/19 2138)  BP: 131/66 (04/11/19 2130)  SpO2: 95 % (04/11/19 2138) Vital Signs (24h Range):  Temp:  [96.9 °F (36.1 °C)-99.4 °F (37.4 °C)] 98.7 °F (37.1 °C)  Pulse:  [] 74  Resp:  [15-40] 34  SpO2:  [92 %-99 %] 95 %  BP: ()/(54-84) 131/66     Weight: 105.6 kg (232 lb 12.9 oz)  Body mass index is 31.57 kg/m².    SpO2: 95 %  O2 Device (Oxygen Therapy): nasal cannula      Intake/Output Summary (Last 24 hours) at 4/11/2019 2202  Last data filed at 4/11/2019 1930  Gross per 24 hour   Intake 536.16 ml   Output 1470 ml   Net -933.84 ml       Lines/Drains/Airways     Drain                 Urethral Catheter 04/08/19 0630 Non-latex;Straight-tip 16 Fr. 3 days          Line                 IABP 04/10/19 8.0 Fr. 1 day          Peripheral Intravenous Line                 Peripheral IV - Single Lumen 04/11/19 0800 Posterior;Right  Hand less than 1 day         Peripheral IV - Single Lumen 04/11/19 1200 20 G Left;Posterior Forearm less than 1 day                Physical Exam   Constitutional: He is oriented to person, place, and time. He appears well-developed and well-nourished. No distress.   HENT:   Head: Normocephalic and atraumatic.   Neck: No JVD (estimated CVP ~10) present.   Cardiovascular: Normal rate, regular rhythm, normal heart sounds and intact distal pulses. Exam reveals no gallop and no friction rub.   No murmur heard.  Pulses:       Radial pulses are 2+ on the right side, and 2+ on the left side.        Dorsalis pedis pulses are 2+ on the right side, and 2+ on the left side.   Left femoral IABP in place. Extremities cool to touch   Pulmonary/Chest: Effort normal and breath sounds normal. No respiratory distress. He has no wheezes. He has no rales.   Abdominal: Soft. Bowel sounds are normal. He exhibits no distension. There is no tenderness.   Musculoskeletal: He exhibits no edema.   Neurological: He is alert and oriented to person, place, and time.   Skin: He is not diaphoretic.       Significant Labs:     Recent Results (from the past 24 hour(s))   Comprehensive metabolic panel    Collection Time: 04/11/19  3:14 AM   Result Value Ref Range    Sodium 135 (L) 136 - 145 mmol/L    Potassium 3.8 3.5 - 5.1 mmol/L    Chloride 99 95 - 110 mmol/L    CO2 24 23 - 29 mmol/L    Glucose 139 (H) 70 - 110 mg/dL    BUN, Bld 34 (H) 6 - 20 mg/dL    Creatinine 1.1 0.5 - 1.4 mg/dL    Calcium 9.1 8.7 - 10.5 mg/dL    Total Protein 6.2 6.0 - 8.4 g/dL    Albumin 2.1 (L) 3.5 - 5.2 g/dL    Total Bilirubin 0.7 0.1 - 1.0 mg/dL    Alkaline Phosphatase 74 55 - 135 U/L    AST 34 10 - 40 U/L    ALT 47 (H) 10 - 44 U/L    Anion Gap 12 8 - 16 mmol/L    eGFR if African American >60 >60 mL/min/1.73 m^2    eGFR if non African American >60 >60 mL/min/1.73 m^2   Magnesium    Collection Time: 04/11/19  3:14 AM   Result Value Ref Range    Magnesium 1.9 1.6 - 2.6 mg/dL    Phosphorus    Collection Time: 04/11/19  3:14 AM   Result Value Ref Range    Phosphorus 4.4 2.7 - 4.5 mg/dL   POCT glucose    Collection Time: 04/11/19  6:03 AM   Result Value Ref Range    POCT Glucose 159 (H) 70 - 110 mg/dL   POCT glucose    Collection Time: 04/11/19 11:54 AM   Result Value Ref Range    POCT Glucose 185 (H) 70 - 110 mg/dL   Basic metabolic panel    Collection Time: 04/11/19  5:58 PM   Result Value Ref Range    Sodium 136 136 - 145 mmol/L    Potassium 3.9 3.5 - 5.1 mmol/L    Chloride 98 95 - 110 mmol/L    CO2 28 23 - 29 mmol/L    Glucose 163 (H) 70 - 110 mg/dL    BUN, Bld 33 (H) 6 - 20 mg/dL    Creatinine 1.1 0.5 - 1.4 mg/dL    Calcium 8.8 8.7 - 10.5 mg/dL    Anion Gap 10 8 - 16 mmol/L    eGFR if African American >60 >60 mL/min/1.73 m^2    eGFR if non African American >60 >60 mL/min/1.73 m^2   Magnesium    Collection Time: 04/11/19  5:58 PM   Result Value Ref Range    Magnesium 2.2 1.6 - 2.6 mg/dL   POCT glucose    Collection Time: 04/11/19  6:47 PM   Result Value Ref Range    POCT Glucose 176 (H) 70 - 110 mg/dL         Significant Imaging:     I have reviewed all pertinent imaging studies from the last 24 hours      Assessment and Plan:     * NSTEMI (non-ST elevated myocardial infarction)  late presenting MI  Status post PCI of the LAD  Continue DAPT with ASA, clopidogrel  Hold coreg, ACE/ARB therapy while IABP in place  Patient has not been on pressors    VT (ventricular tachycardia)  Tracings in chart appear to be polymorphic VT  Continue IV amiodarone infusion 1 mg/min  Cardiac monitoring  Pacer pads on patient  Monitor/replete electrolytes  K > 4, Mg > 2    Acute congestive heart failure  IABP 1:1  Minimal intensity heparin infusion  Daily CXR to monitor IABP positioning  Continue Lasix 40 IV BID  Net - 4.4 L  Daily weight, strict I/O, fluid and sodium restriction    Acute respiratory failure with hypoxia and hypercapnia  Secondary to pulmonary edema  Currently stable on BiPAP  Pulmonary  following    Hyperlipidemia  Continue atorvastatin 40    Diabetes mellitus type II, uncontrolled  Continue insulin detemir 10 units nightly  Target -180 while hospitalized    Tobacco use disorder  Counseled    Obesity, unspecified  Diet and exercise        VTE Risk Mitigation (From admission, onward)        Ordered     heparin 25,000 units in dextrose 5% 250 ml (100 units/mL) infusion MINIMAL INTENSITY nomogram - [facility]  Continuous      04/11/19 2143     Reason for no Mechanical VTE Prophylaxis  Once      04/11/19 2143     IP VTE HIGH RISK PATIENT  Once      04/11/19 2143     Place NANCY hose  Until discontinued      04/08/19 0519     Place sequential compression device  Until discontinued      04/08/19 0519          Corona Kitchen MD  Cardiology   [facility]

## 2019-04-12 NOTE — ASSESSMENT & PLAN NOTE
late presenting MI  Status post PCI of the LAD  Continue DAPT with ASA, clopidogrel  Hold coreg, ACE/ARB therapy while IABP in place  Patient has not been on pressors

## 2019-04-12 NOTE — PROGRESS NOTES
[facility]  Cardiology  Progress Note    Patient Name: Kristopher Kevin  MRN: [id number]  Admission Date: 4/8/2019  Hospital Length of Stay: 4 days  Code Status: Full Code   Attending Physician: Yovany Macdonald MD   Primary Care Physician: Jhoan Lundy MD  Expected Discharge Date: 4/17/2019  Principal Problem:NSTEMI (non-ST elevated myocardial infarction)    Subjective:     Hospital Course:   4-8:  Admitted with hypertensive emergency  4-9:  Status post PCI of the LAD  4-10:  Recurrent VT/VF arrest post resuscitation, IABP placed    Interval History: Patient on bipap over night which he felt very comfortable on.     Taken off this AM, he feels his breathing has improved. Complains of occasional dry cough.     No CP.     Repeat angiogram performed today.     Review of Systems   Constitution: Negative for chills and fever.   HENT: Negative for hearing loss.    Eyes: Negative for visual disturbance.   Cardiovascular: Negative for chest pain, leg swelling and orthopnea.   Respiratory: Positive for cough and shortness of breath. Negative for sputum production.    Musculoskeletal: Negative for joint swelling and muscle cramps.   Gastrointestinal: Negative for nausea and vomiting.   Neurological: Negative for headaches.   Psychiatric/Behavioral: Negative for altered mental status and substance abuse.     Objective:     Vital Signs (Most Recent):  Temp: (pt in cath lab) (04/12/19 1700)  Pulse: 74 (04/12/19 1500)  Resp: (!) 26 (04/12/19 1500)  BP: 108/63 (04/12/19 1500)  SpO2: 96 % (04/12/19 1500) Vital Signs (24h Range):  Temp:  [96.9 °F (36.1 °C)-99 °F (37.2 °C)] 98.8 °F (37.1 °C)  Pulse:  [] 74  Resp:  [15-38] 26  SpO2:  [92 %-100 %] 96 %  BP: ()/(54-80) 108/63     Weight: 105.6 kg (232 lb 12.9 oz)  Body mass index is 31.57 kg/m².     SpO2: 96 %  O2 Device (Oxygen Therapy): nasal cannula      Intake/Output Summary (Last 24 hours) at 4/12/2019 1711  Last data filed at 4/12/2019  1500  Gross per 24 hour   Intake 734.37 ml   Output 1755 ml   Net -1020.63 ml       Lines/Drains/Airways     Central Venous Catheter Line                 Percutaneous Central Line Insertion/Assessment - triple lumen  04/12/19 1259 right internal jugular less than 1 day          Drain                 Urethral Catheter 04/08/19 0630 Non-latex;Straight-tip 16 Fr. 4 days          Line                 IABP 04/10/19 8.0 Fr. 2 days          Peripheral Intravenous Line                 Peripheral IV - Single Lumen 04/11/19 0800 Posterior;Right Hand 1 day         Peripheral IV - Single Lumen 04/11/19 1200 20 G Left;Posterior Forearm 1 day         Peripheral IV - Single Lumen 04/12/19 0700 18 G Right Forearm less than 1 day                Physical Exam   Constitutional: He is oriented to person, place, and time. He appears well-developed and well-nourished. No distress.   HENT:   Head: Normocephalic and atraumatic.   Eyes: Conjunctivae are normal. No scleral icterus.   Neck: Normal range of motion. Neck supple. No thyromegaly present.   Cardiovascular: Normal rate, regular rhythm, normal heart sounds and intact distal pulses. Exam reveals no gallop and no friction rub.   No murmur heard.  Pulses:       Radial pulses are 2+ on the right side, and 2+ on the left side.        Dorsalis pedis pulses are 2+ on the right side, and 2+ on the left side.   Left femoral IABP in place. Extremities cool to touch   Pulmonary/Chest: Effort normal and breath sounds normal. No respiratory distress. He has no wheezes. He has no rales.   Abdominal: Soft. Bowel sounds are normal. He exhibits no distension. There is no tenderness.   Musculoskeletal: He exhibits no edema.   Lymphadenopathy:     He has no cervical adenopathy.   Neurological: He is alert and oriented to person, place, and time.   Skin: Skin is warm and dry. He is not diaphoretic.   Psychiatric: He has a normal mood and affect. His behavior is normal.   Nursing note and vitals  reviewed.      Assessment and Plan:     Brief HPI: 53 M with DM and tobacco history presented with late NSTEMI s/p PCI with residual disease, acute decompensated heart failure with balloon pump in place.     * NSTEMI (non-ST elevated myocardial infarction)  late presenting MI  Status post PCI of the LAD with residual disease  Continue DAPT with ASA, clopidogrel  Hold coreg, ACE/ARB therapy while IABP in place  Repeat angiogram today    Acute respiratory failure with hypoxia  Evidence of pulmonary edema on chest xray.   Also covering for CAP given hypoxia, bilateral infiltrates, elevated procalcitonin    VT (ventricular tachycardia)  Tracings in chart appear to be polymorphic VT  Continue IV amiodarone infusion 1 mg/min  Cardiac monitoring  Pacer pads on patient  Monitor/replete electrolytes  K > 4, Mg > 2    Acute congestive heart failure  CVP Elevated, evidence of likely pulmonary edema on CXR.   Balloon pump in place.   Increase Lasix to 80 IV BID. Continue to diurese    Acute respiratory failure with hypoxia and hypercapnia  Secondary to pulmonary edema  Currently stable on BiPAP  Pulmonary following    Hyperlipidemia  Continue atorvastatin 40    Diabetes mellitus type II, uncontrolled  Continue insulin detemir 10 units nightly  Target -180 while hospitalized    Tobacco use disorder  Counseled        VTE Risk Mitigation (From admission, onward)        Ordered     heparin (porcine) injection  As needed (PRN)      04/12/19 1614     heparin 25,000 units in dextrose 5% 250 ml (100 units/mL) infusion MINIMAL INTENSITY nomogram - [facility]  Continuous      04/11/19 2143     Reason for no Mechanical VTE Prophylaxis  Once      04/11/19 2143     IP VTE HIGH RISK PATIENT  Once      04/11/19 2143     Place NANCY hose  Until discontinued      04/08/19 0519     Place sequential compression device  Until discontinued      04/08/19 0519          Antoine Scott MD   Internal Medicine PGY-2  [phone]

## 2019-04-12 NOTE — SUBJECTIVE & OBJECTIVE
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 CATH LAB    HERNIA REPAIR      INSERTION, INTRA-AORTIC BALLOON PUMP N/A 4/10/2019    Performed by Adrian Jacobo MD at [facility] CATH LAB    IVUS, Coronary  4/9/2019    Performed by Adrian Jacobo MD at [facility] CATH LAB    Percutaneous coronary intervention N/A 4/9/2019    Performed by Adrian Jacobo MD at [facility] CATH LAB       Review of patient's allergies indicates:  No Known Allergies    No current facility-administered medications on file prior to encounter.      Current Outpatient Medications on File Prior to Encounter   Medication Sig    blood sugar diagnostic Strp 1 strip by Misc.(Non-Drug; Combo Route) route 3 (three) times daily.    blood-glucose meter kit Use as instructed    furosemide (LASIX) 20 MG tablet Take 1 tablet (20 mg total) by mouth daily as needed (shortness of breath).    glimepiride (AMARYL) 4 MG tablet Take 1 tablet (4 mg total) by mouth daily with breakfast.    irbesartan (AVAPRO) 75 MG tablet Take 1 tablet (75 mg total) by mouth once daily.    lancets Misc 1 application by Misc.(Non-Drug; Combo Route) route 3 (three) times daily.     Family History     Problem Relation (Age of Onset)    Diabetes Mother        Tobacco Use    Smoking status: Current Every Day Smoker     Packs/day: 1.50     Types: Cigarettes    Smokeless tobacco: Never Used    Tobacco comment: Quick 3 days ago   Substance and Sexual  Activity    Alcohol use: Yes    Drug use: Never    Sexual activity: Not Currently     Review of Systems   Constitution: Negative.   HENT: Negative.    Eyes: Negative.    Cardiovascular: Positive for dyspnea on exertion, leg swelling, orthopnea and paroxysmal nocturnal dyspnea.   Respiratory: Negative.    Endocrine: Negative.    Hematologic/Lymphatic: Negative.    Skin: Negative.    Musculoskeletal: Negative.    Gastrointestinal: Negative.    Genitourinary: Negative.    Neurological: Negative.    Psychiatric/Behavioral: Negative.      Objective:     Vital Signs (Most Recent):  Temp: 98.7 °F (37.1 °C) (04/11/19 2105)  Pulse: 74 (04/11/19 2138)  Resp: (!) 34 (04/11/19 2138)  BP: 131/66 (04/11/19 2130)  SpO2: 95 % (04/11/19 2138) Vital Signs (24h Range):  Temp:  [96.9 °F (36.1 °C)-99.4 °F (37.4 °C)] 98.7 °F (37.1 °C)  Pulse:  [] 74  Resp:  [15-40] 34  SpO2:  [92 %-99 %] 95 %  BP: ()/(54-84) 131/66       Weight: 105.6 kg (232 lb 12.9 oz)  Body mass index is 31.57 kg/m².    SpO2: 95 %  O2 Device (Oxygen Therapy): nasal cannula    Physical Exam   Constitutional: He is oriented to person, place, and time. He appears well-developed and well-nourished. No distress.   HENT:   Head: Normocephalic and atraumatic.   Neck: No JVD (estimated CVP ~10) present.   Cardiovascular: Normal rate, regular rhythm, normal heart sounds and intact distal pulses. Exam reveals no gallop and no friction rub.   No murmur heard.  Pulses:       Radial pulses are 2+ on the right side, and 2+ on the left side.        Dorsalis pedis pulses are 2+ on the right side, and 2+ on the left side.   Left femoral IABP in place. Extremities cool to touch   Pulmonary/Chest: Effort normal and breath sounds normal. No respiratory distress. He has no wheezes. He has no rales.   Abdominal: Soft. Bowel sounds are normal. He exhibits no distension. There is no tenderness.   Musculoskeletal: He exhibits no edema.   Neurological: He is alert and oriented  to person, place, and time.   Skin: He is not diaphoretic.       Significant Labs:     Recent Results (from the past 24 hour(s))   Comprehensive metabolic panel    Collection Time: 04/11/19  3:14 AM   Result Value Ref Range    Sodium 135 (L) 136 - 145 mmol/L    Potassium 3.8 3.5 - 5.1 mmol/L    Chloride 99 95 - 110 mmol/L    CO2 24 23 - 29 mmol/L    Glucose 139 (H) 70 - 110 mg/dL    BUN, Bld 34 (H) 6 - 20 mg/dL    Creatinine 1.1 0.5 - 1.4 mg/dL    Calcium 9.1 8.7 - 10.5 mg/dL    Total Protein 6.2 6.0 - 8.4 g/dL    Albumin 2.1 (L) 3.5 - 5.2 g/dL    Total Bilirubin 0.7 0.1 - 1.0 mg/dL    Alkaline Phosphatase 74 55 - 135 U/L    AST 34 10 - 40 U/L    ALT 47 (H) 10 - 44 U/L    Anion Gap 12 8 - 16 mmol/L    eGFR if African American >60 >60 mL/min/1.73 m^2    eGFR if non African American >60 >60 mL/min/1.73 m^2   Magnesium    Collection Time: 04/11/19  3:14 AM   Result Value Ref Range    Magnesium 1.9 1.6 - 2.6 mg/dL   Phosphorus    Collection Time: 04/11/19  3:14 AM   Result Value Ref Range    Phosphorus 4.4 2.7 - 4.5 mg/dL   POCT glucose    Collection Time: 04/11/19  6:03 AM   Result Value Ref Range    POCT Glucose 159 (H) 70 - 110 mg/dL   POCT glucose    Collection Time: 04/11/19 11:54 AM   Result Value Ref Range    POCT Glucose 185 (H) 70 - 110 mg/dL   Basic metabolic panel    Collection Time: 04/11/19  5:58 PM   Result Value Ref Range    Sodium 136 136 - 145 mmol/L    Potassium 3.9 3.5 - 5.1 mmol/L    Chloride 98 95 - 110 mmol/L    CO2 28 23 - 29 mmol/L    Glucose 163 (H) 70 - 110 mg/dL    BUN, Bld 33 (H) 6 - 20 mg/dL    Creatinine 1.1 0.5 - 1.4 mg/dL    Calcium 8.8 8.7 - 10.5 mg/dL    Anion Gap 10 8 - 16 mmol/L    eGFR if African American >60 >60 mL/min/1.73 m^2    eGFR if non African American >60 >60 mL/min/1.73 m^2   Magnesium    Collection Time: 04/11/19  5:58 PM   Result Value Ref Range    Magnesium 2.2 1.6 - 2.6 mg/dL   POCT glucose    Collection Time: 04/11/19  6:47 PM   Result Value Ref Range    POCT Glucose  176 (H) 70 - 110 mg/dL         Significant Imaging:     I have reviewed all pertinent imaging studies from the last 24 hours

## 2019-04-12 NOTE — PROGRESS NOTES
"  Food & Nutrition  Education    Diet Education: Diabetic (A1C 9.1)  Time Spent: 10 minutes  Learners: Patient     Nutrition Education provided with handouts. All questions and concerns answered. Dietitian's contact information provided.   Comments: Pt reports he has never been educated on diabetic diet, pt's wife does the cooking at home (not at bedside). Pt reports he occasionally skips meals at home, reinforced importance of consistent CHO intake. Pt also states he used to drink soft drinks, but has been "cutting back." No further questions at this time.     Follow-Up: 4/19    Please re-consult as needed.    Thanks!      "

## 2019-04-12 NOTE — SUBJECTIVE & OBJECTIVE
Interval History: Patient on bipap over night which he felt very comfortable on.     Taken off this AM, he feels his breathing has improved. Complains of occasional dry cough.     No CP.     Repeat angiogram performed today.     Review of Systems   Constitution: Negative for chills and fever.   HENT: Negative for hearing loss.    Eyes: Negative for visual disturbance.   Cardiovascular: Negative for chest pain, leg swelling and orthopnea.   Respiratory: Positive for cough and shortness of breath. Negative for sputum production.    Musculoskeletal: Negative for joint swelling and muscle cramps.   Gastrointestinal: Negative for nausea and vomiting.   Neurological: Negative for headaches.   Psychiatric/Behavioral: Negative for altered mental status and substance abuse.     Objective:     Vital Signs (Most Recent):  Temp: (pt in cath lab) (04/12/19 1700)  Pulse: 74 (04/12/19 1500)  Resp: (!) 26 (04/12/19 1500)  BP: 108/63 (04/12/19 1500)  SpO2: 96 % (04/12/19 1500) Vital Signs (24h Range):  Temp:  [96.9 °F (36.1 °C)-99 °F (37.2 °C)] 98.8 °F (37.1 °C)  Pulse:  [] 74  Resp:  [15-38] 26  SpO2:  [92 %-100 %] 96 %  BP: ()/(54-80) 108/63     Weight: 105.6 kg (232 lb 12.9 oz)  Body mass index is 31.57 kg/m².     SpO2: 96 %  O2 Device (Oxygen Therapy): nasal cannula      Intake/Output Summary (Last 24 hours) at 4/12/2019 1711  Last data filed at 4/12/2019 1500  Gross per 24 hour   Intake 734.37 ml   Output 1755 ml   Net -1020.63 ml       Lines/Drains/Airways     Central Venous Catheter Line                 Percutaneous Central Line Insertion/Assessment - triple lumen  04/12/19 1259 right internal jugular less than 1 day          Drain                 Urethral Catheter 04/08/19 0630 Non-latex;Straight-tip 16 Fr. 4 days          Line                 IABP 04/10/19 8.0 Fr. 2 days          Peripheral Intravenous Line                 Peripheral IV - Single Lumen 04/11/19 0800 Posterior;Right Hand 1 day         Peripheral  IV - Single Lumen 04/11/19 1200 20 G Left;Posterior Forearm 1 day         Peripheral IV - Single Lumen 04/12/19 0700 18 G Right Forearm less than 1 day                Physical Exam   Constitutional: He is oriented to person, place, and time. He appears well-developed and well-nourished. No distress.   HENT:   Head: Normocephalic and atraumatic.   Eyes: Conjunctivae are normal. No scleral icterus.   Neck: Normal range of motion. Neck supple. No thyromegaly present.   Cardiovascular: Normal rate, regular rhythm, normal heart sounds and intact distal pulses. Exam reveals no gallop and no friction rub.   No murmur heard.  Pulses:       Radial pulses are 2+ on the right side, and 2+ on the left side.        Dorsalis pedis pulses are 2+ on the right side, and 2+ on the left side.   Left femoral IABP in place. Extremities cool to touch   Pulmonary/Chest: Effort normal and breath sounds normal. No respiratory distress. He has no wheezes. He has no rales.   Abdominal: Soft. Bowel sounds are normal. He exhibits no distension. There is no tenderness.   Musculoskeletal: He exhibits no edema.   Lymphadenopathy:     He has no cervical adenopathy.   Neurological: He is alert and oriented to person, place, and time.   Skin: Skin is warm and dry. He is not diaphoretic.   Psychiatric: He has a normal mood and affect. His behavior is normal.   Nursing note and vitals reviewed.

## 2019-04-12 NOTE — ASSESSMENT & PLAN NOTE
Evidence of pulmonary edema on chest xray.   Also covering for CAP given hypoxia, bilateral infiltrates, elevated procalcitonin

## 2019-04-12 NOTE — ASSESSMENT & PLAN NOTE
Tracings in paper chart appear consistent with polymorphic VT  Continue IV amiodarone infusion 1 mg/min  Start IV lidocaine 1 mg/min  After 6 hours of IV lidocaine infusion, reduce IV Amiodarone dose to 0.5 mg/min  Cardiac monitoring  Pacer pads on patient  Monitor/replete electrolytes  K > 4, Mg > 2

## 2019-04-12 NOTE — BRIEF OP NOTE
Post Cath Note  Referring Physician: Yovany Macdonald MD  Procedure: ANGIOGRAM, CORONARY ARTERY (N/A), INSERTION, STENT, CORONARY ARTERY (N/A)       Access: Right radial    Obstructive ostial LAD lesion. Otherwise non-obstructive CAD with patent stents    LVEDP 35 mmHg    See full report for further details    Intervention:   PCI with SHIRA to proximal LAD    Closure device: Radial band    Post Cath Exam:   /63 (BP Location: Left arm, Patient Position: Lying)   Pulse 74   Temp 98.8 °F (37.1 °C) (Oral)   Resp (!) 26   Ht 6' (1.829 m)   Wt 105.6 kg (232 lb 12.9 oz)   SpO2 96%   BMI 31.57 kg/m²   No unusual pain, hematoma, thrill or bruit at vascular access site.  Distal pulse present without signs of ischemia.    Recommendations:   - Routine post-cath care  - maintain IABP for now to add with volume removal  - Continue medical management, Risk factor reduction, Plavix for at least 1 year and ASA 81 mg indefinitely    Signed:  Ihsan Benjamin MD  Cardiology Fellow  Pager: [phone]

## 2019-04-12 NOTE — NURSING
Flight team here to transport patient to [facility].

## 2019-04-12 NOTE — ASSESSMENT & PLAN NOTE
CVP Elevated, evidence of likely pulmonary edema on CXR.   Balloon pump in place.   Increase Lasix to 80 IV BID. Continue to diurese

## 2019-04-12 NOTE — ASSESSMENT & PLAN NOTE
late presenting MI  Status post PCI of the LAD with residual disease  Continue DAPT with ASA, clopidogrel  Hold coreg, ACE/ARB therapy while IABP in place  Repeat angiogram today

## 2019-04-12 NOTE — NURSING TRANSFER
Nursing Transfer Note      4/11/2019 - off unit at 2010    Transfer [facility]    Transfer via [facility]    Transfer with EMT-P and RN    Transported by [facility]    Medicines sent: none  Chart send with patient: yes    Notified: [facility]    Patient reassessed at:4/11/19 at 1945  Upon arrival to floor:

## 2019-04-12 NOTE — ASSESSMENT & PLAN NOTE
Tracings in chart appear to be polymorphic VT  Continue IV amiodarone infusion 1 mg/min  Cardiac monitoring  Pacer pads on patient  Monitor/replete electrolytes  K > 4, Mg > 2

## 2019-04-12 NOTE — HPI
53 year old male with PMH DM2, tobacco use, initially presented with complaints of shortness of breath x 1 week following an episode of chest pain at [facility] with troponin 6.6 where he was admitted with NSTEMI, HTN emergency, respiratory failure and newly diagnosed cardiomyopathy. He also had a brief episode of AF with RVR that converted to NSR following an amiodarone bolus. He was taken to the cath lab and found to have 80% prox LAD lesion s/p PCI with SHIRA. During angiography the patient had catheter induced VT upon entering the LV requiring cardioversion and RHC revealed PCWP 31 and SPAP 56.      On the evening of 4/9/19 the patient was found to be unresponsive, received CPR and defibrillation for pulseless VT. Accelerated idioventricular rhythm was suspected and the patient was started on IV amiodarone. On 4/10/19 the patient experienced VT again requiring two shocks and 3 rounds of chest compressions before ROSC. Rhythm strips present in the chart appear to be polymorphic VT. The patient experienced another VT event requiring defibrillation and was taken to the cath lab for IABP placement.      Since IABP placement patient has not had further episodes of VT.He has been started on amiodarone and lidocaine. Interventional cardiology consulted for coronary angiogram.

## 2019-04-13 NOTE — SIGNIFICANT EVENT
4/13, 5:13 pm    Patient went into pulseless PMVT around 16:52 which then degenerated into VF. CPR initiated / chest compressions x 1 round ; received defibrillation at 120 J x 1 with ROSC. Post ROSC EKG reviewed with no changes (q'd out in anterolateral leads). There were no preceding symptoms prior to patient's arrest. Currently awake/alert/oriented. Of note patient's IABP was de-escalated to 1:2 this morning. Last CVP 7. Will increase IABP back to 1:1 to increase coronary perfusion. Increase lidocaine to 2 mg/min and amio to 1 mg/min, d/w Dr Pitts. F/U STAT labs (CBC, CMP, Mg, lactate). F/U post ROSC CXR.    Amy Womack MD   Cardiology Fellow, PGY-4

## 2019-04-13 NOTE — SUBJECTIVE & OBJECTIVE
Interval History: IABP 1:2 from today       Review of Systems   All other systems reviewed and are negative.    Objective:     Vital Signs (Most Recent):  Temp: 98.3 °F (36.8 °C) (04/13/19 1100)  Pulse: 64 (04/13/19 1400)  Resp: (!) 27 (04/13/19 1400)  BP: 115/74 (04/13/19 1400)  SpO2: 95 % (04/13/19 1400) Vital Signs (24h Range):  Temp:  [98.2 °F (36.8 °C)-98.8 °F (37.1 °C)] 98.3 °F (36.8 °C)  Pulse:  [64-80] 64  Resp:  [16-36] 27  SpO2:  [93 %-99 %] 95 %  BP: ()/(58-91) 115/74     Weight: 105.6 kg (232 lb 12.9 oz)  Body mass index is 31.57 kg/m².     SpO2: 95 %  O2 Device (Oxygen Therapy): nasal cannula      Intake/Output Summary (Last 24 hours) at 4/13/2019 1451  Last data filed at 4/13/2019 1400  Gross per 24 hour   Intake 1523.35 ml   Output 2585 ml   Net -1061.65 ml       Lines/Drains/Airways     Central Venous Catheter Line                 Percutaneous Central Line Insertion/Assessment - triple lumen  04/12/19 1259 right internal jugular 1 day          Drain                 Urethral Catheter 04/08/19 0630 Non-latex;Straight-tip 16 Fr. 5 days          Line                 IABP 04/10/19 8.0 Fr. 3 days          Peripheral Intravenous Line                 Peripheral IV - Single Lumen 04/11/19 0800 Posterior;Right Hand 2 days         Peripheral IV - Single Lumen 04/11/19 1200 20 G Left;Posterior Forearm 2 days         Peripheral IV - Single Lumen 04/12/19 0100 18 G Right Forearm 1 day                Physical Exam   Constitutional: He is oriented to person, place, and time. He appears well-developed and well-nourished. No distress.   HENT:   Head: Normocephalic and atraumatic.   Cardiovascular: Normal rate and regular rhythm.   Pulses:       Dorsalis pedis pulses are 2+ on the right side, and 2+ on the left side.   Pulmonary/Chest: Effort normal. No respiratory distress. He has no wheezes.   Musculoskeletal: He exhibits no edema.   Neurological: He is alert and oriented to person, place, and time.   Skin:  Skin is warm. He is not diaphoretic.   Psychiatric: He has a normal mood and affect.       Significant Labs: All pertinent lab results from the last 24 hours have been reviewed.    Significant Imaging: Echocardiogram:   Transthoracic echo (TTE) complete (Cupid Only):   Results for orders placed or performed during the hospital encounter of 04/08/19   Transthoracic echo (TTE) 2D with Color Flow   Result Value Ref Range    BSA 2.34 m2    TDI SEPTAL 0.04     LV LATERAL E/E' RATIO 13.38     LV SEPTAL E/E' RATIO 26.75     LA WIDTH 4.34 cm    AORTIC VALVE CUSP SEPERATION 2.58 cm    TDI LATERAL 0.08     PV PEAK VELOCITY 1.09 cm/s    LVIDD 4.70 3.5 - 6.0 cm    IVS 1.51 (A) 0.6 - 1.1 cm    PW 1.45 (A) 0.6 - 1.1 cm    Ao root annulus 3.58 cm    LVIDS 3.98 2.1 - 4.0 cm    FS 15 28 - 44 %    LA volume 59.68 cm3    Sinus 3.23 cm    STJ 2.78 cm    Ascending aorta 3.04 cm    LV mass 288.17 g    LA size 2.94 cm    RVDD 4.27 cm    TAPSE 2.00 cm    RV S' 16.79 m/s    Left Ventricle Relative Wall Thickness 0.62 cm    AV mean gradient 4.42 mmHg    AV valve area 2.99 cm2    AV Velocity Ratio 0.98     AV index (prosthetic) 0.92     E/A ratio 2.02     Mean e' 0.06     E wave decelartion time 151.03 msec    IVRT 0.10 msec    Pulm vein S/D ratio 0.69     LVOT diameter 2.03 cm    LVOT area 3.23 cm2    LVOT peak shree [id number] m/s    LVOT peak VTI 22.12 cm    Ao peak shree 1.23 m/s    Ao VTI 23.93 cm    LVOT stroke volume 71.56 cm3    AV peak gradient 6.05 mmHg    E/E' ratio 17.83     MV Peak E Shree 1.07 m/s    TR Max Shree 3.06 m/s    MV Peak A Shree 0.53 m/s    PV Peak S Shree 0.44 m/s    PV Peak D Shree 0.64 m/s    LV Systolic Volume 69.32 mL    LV Systolic Volume Index 30.4 mL/m2    LV Diastolic Volume 102.41 mL    LV Diastolic Volume Index 44.86 mL/m2    LA Volume Index 26.1 mL/m2    LV Mass Index 126.2 g/m2    RA Major Axis 4.58 cm    Left Atrium Minor Axis 5.39 cm    Left Atrium Major Axis 5.62 cm    Triscuspid Valve Regurgitation Peak Gradient  37.45 mmHg    RA Width 4.30 cm    Right Atrial Pressure (from IVC) 3 mmHg    TV rest pulmonary artery pressure 40 mmHg

## 2019-04-13 NOTE — SUBJECTIVE & OBJECTIVE
Interval History: Yesterday, pt got revascularized. He had ostial to prox LAD lesion with 75% stenosis that was s/p PCI with placement of SHIRA x1. Overnight, hypotensive so antihypertensive meds adjusted. Pt still 1:1 on IABP. No VT/VF seen on telemetry. Pt denies acute complaints.     Review of Systems   Cardiovascular: Negative for chest pain, irregular heartbeat, leg swelling, near-syncope, orthopnea, palpitations and syncope.   Respiratory: Negative for cough and shortness of breath.    Gastrointestinal: Negative for abdominal pain, nausea and vomiting.   Neurological: Negative for dizziness, focal weakness, headaches, light-headedness and weakness.     Objective:     Vital Signs (Most Recent):  Temp: 98.2 °F (36.8 °C) (04/13/19 0700)  Pulse: 73 (04/13/19 0800)  Resp: 20 (04/13/19 0800)  BP: (!) 99/59 (04/13/19 0800)  SpO2: (!) 93 % (04/13/19 0800) Vital Signs (24h Range):  Temp:  [98.2 °F (36.8 °C)-99 °F (37.2 °C)] 98.2 °F (36.8 °C)  Pulse:  [65-80] 73  Resp:  [16-36] 20  SpO2:  [93 %-99 %] 93 %  BP: ()/(58-88) 99/59     Weight: 105.6 kg (232 lb 12.9 oz)  Body mass index is 31.57 kg/m².     SpO2: (!) 93 %  O2 Device (Oxygen Therapy): nasal cannula    Physical Exam   Constitutional: He is oriented to person, place, and time. He appears well-developed and well-nourished. No distress.   HENT:   Head: Normocephalic and atraumatic.   Eyes: EOM are normal.   Neck:   TLC in RIJ    Cardiovascular: Normal rate, regular rhythm and normal heart sounds.   IABP via left CFA   Pulmonary/Chest: Effort normal. Rales: on anterior auscultation.   Abdominal: Soft. Bowel sounds are normal.   Genitourinary:   Genitourinary Comments: Coleman in place   Musculoskeletal:   L fem IABP in place, c/d/i   Neurological: He is alert and oriented to person, place, and time.   Skin: Skin is warm and dry. No erythema.   Psychiatric: He has a normal mood and affect. Judgment and thought content normal.       Significant Labs: All pertinent  lab results from the last 24 hours have been reviewed.    Significant Imaging: Echocardiogram:   Transthoracic echo (TTE) complete (Cupid Only):   Results for orders placed or performed during the hospital encounter of 04/08/19   Transthoracic echo (TTE) 2D with Color Flow   Result Value Ref Range    BSA 2.34 m2    TDI SEPTAL 0.04     LV LATERAL E/E' RATIO 13.38     LV SEPTAL E/E' RATIO 26.75     LA WIDTH 4.34 cm    AORTIC VALVE CUSP SEPERATION 2.58 cm    TDI LATERAL 0.08     PV PEAK VELOCITY 1.09 cm/s    LVIDD 4.70 3.5 - 6.0 cm    IVS 1.51 (A) 0.6 - 1.1 cm    PW 1.45 (A) 0.6 - 1.1 cm    Ao root annulus 3.58 cm    LVIDS 3.98 2.1 - 4.0 cm    FS 15 28 - 44 %    LA volume 59.68 cm3    Sinus 3.23 cm    STJ 2.78 cm    Ascending aorta 3.04 cm    LV mass 288.17 g    LA size 2.94 cm    RVDD 4.27 cm    TAPSE 2.00 cm    RV S' 16.79 m/s    Left Ventricle Relative Wall Thickness 0.62 cm    AV mean gradient 4.42 mmHg    AV valve area 2.99 cm2    AV Velocity Ratio 0.98     AV index (prosthetic) 0.92     E/A ratio 2.02     Mean e' 0.06     E wave decelartion time 151.03 msec    IVRT 0.10 msec    Pulm vein S/D ratio 0.69     LVOT diameter 2.03 cm    LVOT area 3.23 cm2    LVOT peak shree [id number] m/s    LVOT peak VTI 22.12 cm    Ao peak shree 1.23 m/s    Ao VTI 23.93 cm    LVOT stroke volume 71.56 cm3    AV peak gradient 6.05 mmHg    E/E' ratio 17.83     MV Peak E Shree 1.07 m/s    TR Max Shree 3.06 m/s    MV Peak A Shree 0.53 m/s    PV Peak S Shree 0.44 m/s    PV Peak D Shree 0.64 m/s    LV Systolic Volume 69.32 mL    LV Systolic Volume Index 30.4 mL/m2    LV Diastolic Volume 102.41 mL    LV Diastolic Volume Index 44.86 mL/m2    LA Volume Index 26.1 mL/m2    LV Mass Index 126.2 g/m2    RA Major Axis 4.58 cm    Left Atrium Minor Axis 5.39 cm    Left Atrium Major Axis 5.62 cm    Triscuspid Valve Regurgitation Peak Gradient 37.45 mmHg    RA Width 4.30 cm    Right Atrial Pressure (from IVC) 3 mmHg    TV rest pulmonary artery pressure 40 mmHg

## 2019-04-13 NOTE — PROGRESS NOTES
04/13/19 1400        Urethral Catheter 04/08/19 0630 Non-latex;Straight-tip 16 Fr.   Placement Date/Time: 04/08/19 0630   Hand Hygiene: Performed  Inserted by: RN  Insertion attempts (enter comment if more than 2 attempts): 1  Catheter Type: Non-latex;Straight-tip  Tube Size (Fr.): 16 Fr.  Catheter Balloon Inflation Volume: 10 mL  Uri...   Output (mL) 60 mL   bloody urine noted from previous concentrated orange urine @ 1300. Denies back pain.  MD Shanta updated- ordered CBC, ptt, pt, BMP, UA. readback x2  1602: per MD Shanta okay to increase per protocol  with hematuria since CBC stable  1750: reynaga irrigated per protocol. No clots/resistance met.

## 2019-04-13 NOTE — PROGRESS NOTES
[facility]  Cardiology  Progress Note    Patient Name: Kristopher Kevin  MRN: [id number]  Admission Date: 4/8/2019  Hospital Length of Stay: 5 days  Code Status: Full Code   Attending Physician: Yovany Macodnald MD   Primary Care Physician: Jhoan Lundy MD  Expected Discharge Date: 4/17/2019  Principal Problem:NSTEMI (non-ST elevated myocardial infarction)    Subjective:     Hospital Course:   4-8:  Admitted with hypertensive emergency  4-9:  Status post PCI of the LAD  4-10:  Recurrent VT/VF arrest post resuscitation, IABP placed    Interval History: IABP 1:2 from today   Prox LAD PCI yesterday     Review of Systems   All other systems reviewed and are negative.    Objective:     Vital Signs (Most Recent):  Temp: 98.3 °F (36.8 °C) (04/13/19 1100)  Pulse: 64 (04/13/19 1400)  Resp: (!) 27 (04/13/19 1400)  BP: 115/74 (04/13/19 1400)  SpO2: 95 % (04/13/19 1400) Vital Signs (24h Range):  Temp:  [98.2 °F (36.8 °C)-98.8 °F (37.1 °C)] 98.3 °F (36.8 °C)  Pulse:  [64-80] 64  Resp:  [16-36] 27  SpO2:  [93 %-99 %] 95 %  BP: ()/(58-91) 115/74     Weight: 105.6 kg (232 lb 12.9 oz)  Body mass index is 31.57 kg/m².     SpO2: 95 %  O2 Device (Oxygen Therapy): nasal cannula      Intake/Output Summary (Last 24 hours) at 4/13/2019 1451  Last data filed at 4/13/2019 1400  Gross per 24 hour   Intake 1523.35 ml   Output 2585 ml   Net -1061.65 ml       Lines/Drains/Airways     Central Venous Catheter Line                 Percutaneous Central Line Insertion/Assessment - triple lumen  04/12/19 1259 right internal jugular 1 day          Drain                 Urethral Catheter 04/08/19 0630 Non-latex;Straight-tip 16 Fr. 5 days          Line                 IABP 04/10/19 8.0 Fr. 3 days          Peripheral Intravenous Line                 Peripheral IV - Single Lumen 04/11/19 0800 Posterior;Right Hand 2 days         Peripheral IV - Single Lumen 04/11/19 1200 20 G Left;Posterior Forearm 2 days         Peripheral IV -  Single Lumen 04/12/19 0100 18 G Right Forearm 1 day                Physical Exam   Constitutional: He is oriented to person, place, and time. He appears well-developed and well-nourished. No distress.   HENT:   Head: Normocephalic and atraumatic.   Cardiovascular: Normal rate and regular rhythm.   Pulses:       Dorsalis pedis pulses are 2+ on the right side, and 2+ on the left side.   Pulmonary/Chest: Effort normal. No respiratory distress. He has no wheezes.   Musculoskeletal: He exhibits no edema.   Neurological: He is alert and oriented to person, place, and time.   Skin: Skin is warm. He is not diaphoretic.   Psychiatric: He has a normal mood and affect.       Significant Labs: All pertinent lab results from the last 24 hours have been reviewed.    Significant Imaging: Echocardiogram:   Transthoracic echo (TTE) complete (Cupid Only):   Results for orders placed or performed during the hospital encounter of 04/08/19   Transthoracic echo (TTE) 2D with Color Flow   Result Value Ref Range    BSA 2.34 m2    TDI SEPTAL 0.04     LV LATERAL E/E' RATIO 13.38     LV SEPTAL E/E' RATIO 26.75     LA WIDTH 4.34 cm    AORTIC VALVE CUSP SEPERATION 2.58 cm    TDI LATERAL 0.08     PV PEAK VELOCITY 1.09 cm/s    LVIDD 4.70 3.5 - 6.0 cm    IVS 1.51 (A) 0.6 - 1.1 cm    PW 1.45 (A) 0.6 - 1.1 cm    Ao root annulus 3.58 cm    LVIDS 3.98 2.1 - 4.0 cm    FS 15 28 - 44 %    LA volume 59.68 cm3    Sinus 3.23 cm    STJ 2.78 cm    Ascending aorta 3.04 cm    LV mass 288.17 g    LA size 2.94 cm    RVDD 4.27 cm    TAPSE 2.00 cm    RV S' 16.79 m/s    Left Ventricle Relative Wall Thickness 0.62 cm    AV mean gradient 4.42 mmHg    AV valve area 2.99 cm2    AV Velocity Ratio 0.98     AV index (prosthetic) 0.92     E/A ratio 2.02     Mean e' 0.06     E wave decelartion time 151.03 msec    IVRT 0.10 msec    Pulm vein S/D ratio 0.69     LVOT diameter 2.03 cm    LVOT area 3.23 cm2    LVOT peak artur 7.6522519782 m/s    LVOT peak VTI 22.12 cm    Ao peak  shree 1.23 m/s    Ao VTI 23.93 cm    LVOT stroke volume 71.56 cm3    AV peak gradient 6.05 mmHg    E/E' ratio 17.83     MV Peak E Shree 1.07 m/s    TR Max Shree 3.06 m/s    MV Peak A Shree 0.53 m/s    PV Peak S Shree 0.44 m/s    PV Peak D Shree 0.64 m/s    LV Systolic Volume 69.32 mL    LV Systolic Volume Index 30.4 mL/m2    LV Diastolic Volume 102.41 mL    LV Diastolic Volume Index 44.86 mL/m2    LA Volume Index 26.1 mL/m2    LV Mass Index 126.2 g/m2    RA Major Axis 4.58 cm    Left Atrium Minor Axis 5.39 cm    Left Atrium Major Axis 5.62 cm    Triscuspid Valve Regurgitation Peak Gradient 37.45 mmHg    RA Width 4.30 cm    Right Atrial Pressure (from IVC) 3 mmHg    TV rest pulmonary artery pressure 40 mmHg     Assessment and Plan:     Will continue to wean IABP   Started GDMT with ACE-I     * NSTEMI (non-ST elevated myocardial infarction)  DAPT   Statin   BP<130/80      VT (ventricular tachycardia)  Will follow EP recs   C/w Lidocaine and amio gtt   Revascularized     Acute congestive heart failure  Lasix 80mg IV BID   IABP 1:2   CVP q shift   SVO2 Q12h       Hyperlipidemia  Continue atorvastatin 40    Diabetes mellitus type II, uncontrolled  Continue insulin detemir 10 units nightly  Target -180 while hospitalized    Tobacco use disorder  Counseled        VTE Risk Mitigation (From admission, onward)        Ordered     heparin 25,000 units in dextrose 5% 250 ml (100 units/mL) infusion MINIMAL INTENSITY nomogram - [facility]  Continuous      04/11/19 2143     Reason for no Mechanical VTE Prophylaxis  Once      04/11/19 2143     IP VTE HIGH RISK PATIENT  Once      04/11/19 2143     Place NANCY hose  Until discontinued      04/08/19 0519     Place sequential compression device  Until discontinued      04/08/19 0519          Zakia Sanchez MD  Cardiology  [facility]

## 2019-04-13 NOTE — PROGRESS NOTES
Called to room by RN for code. Bagged pt via 15L ambu. Pt regained pulse, and placed on BIPAP. Settings of 15/8 rate of 12 and 30%. No complications, will continue to monitor.

## 2019-04-13 NOTE — PLAN OF CARE
No acute events throughout day. See vital signs and assessments in flowsheets. See below for updates on today's progress.     Pulmonary: 5 L NC, lung sounds diminished throughout     Cardiovascular: NSR 70-80,  Cuff -120/77-80, Lt groin IABP 1:1, EKG, Auto, BP /60s, MAPs 80-90, no issues   Cath lab angiogram done today w/ stent to proximal LAD, pt returned to room @ 1745, Rt radial TR band removed @ 1820, no bleeding or hematoma to site     Neurological: A/O X4, moves ext. spontaneously X4     Gastrointestinal: Diabetic/Cardiac Diet, Last BM 4/10    Genitourinary: Coleman catheter in place, dark orange urine w/ sediment, ~1.4 L out for shift     Endocrine: CBG checked Q 6hrs, S/S insulin given per orders     Integumentary/Other: skin intact  Rt IJ TLC  PIV X 3  Lt groin IABP    Infusions: Amio @ .5, Lido @ 1, Heparin @ 14 (aPTT recheck due @ 2345)    Patient progressing towards goals as tolerated, plan of care communicated and reviewed with pt and family. All questions and concerns addressed. Will continue to monitor.

## 2019-04-13 NOTE — ASSESSMENT & PLAN NOTE
54 yo M with PMHx DM2 (A1c 9.1), HTN, DLD, active smoking (30 py) admitted to OSH on 4/8 with late presenting MI (trop > 6.6) s/p LHC demonstrating 80% LAD lesion s/p PCI/SHIRA. Post procedural course c/b recurrent polymorphic VT s/p multiple shocks, amio bolus/ infusion, and ultimately IABP  placement on 4/10. [place] [facility] [place] for higher level of care due to refractory PMVT. On 4/12/19, pt went to Cath lab and had PCI with SHIRA x1 to ostial- prox LAD region. He is being monitored in the CCU with no further VT/VF events.       Recommendations:  -- Left ventricular unloading and management of cardiogenic shock per primary team.  -- Continue lidocaine 1 mg/min and amiodarone 0.5 mg/min for 48 hours post-second re-vascularization procedure  -- Monitor and replete electrolytes, K>4, Mg>2.  -- Will need LifeVest on discharge

## 2019-04-13 NOTE — PLAN OF CARE
04/13/2019  1:34 AM    Rounded on patient, only 400cc UOP since PM dose of lasix. Patient is cool to exam.     CVP: 9  CO: 4.6  CI: 1.99  SVR: 1228  SVO2 = 58    /60 (BP Location: Left arm, Patient Position: Lying)   Pulse 68   Temp 98.3 °F (36.8 °C) (Oral)   Resp (!) 23   Ht 6' (1.829 m)   Wt 105.6 kg (232 lb 12.9 oz)   SpO2 97%   BMI 31.57 kg/m²     Assessment:  1. Cardiogenic shock with persistently elevated SVR despite IABP. With recent history of VT, appears euvolemic though elevated LVEDP on echo. All of this suggests persistently elevated afterload.    Plan:  Continue 1:1 IABP  Stop lisinopril, add enalaprilat 0.125 q6h with holding parameters for additional afterload reduction. Maximize dose and convert to once-daily PO AceI/ARB when hemodynamics improve.    Ihsan Benjamin

## 2019-04-13 NOTE — PROGRESS NOTES
1652: sustained VT on monitor. Pulse lost while in VT. Chest compressions began, O2 via ambu bag, pads applied. Vfib on monitor. MD at bedside. 1 shock delivered 120J. ROSC obtained after defibrillation. Patient AAOx4 after, moving all extremities equally, no neuro deficits noted. Family updated.     - patient AAOx4 pre-code. No c/o CP, SOB, pain, and/or discomfort  -Primary RN at bedside before, during and after code  - Charge RN, RT, MD Renae, MD Shanta at bedside during events & after events  - post code STAT labs, CXR ordered, lidocaine and amio dose changes made, IABP changed to 1:1.

## 2019-04-14 NOTE — PROGRESS NOTES
Epic downtime 04/14/19 1677-0551. All paperwork (vitals, assessment, orders, labs, IABP #s, gtts, etc) was paper charted and placed in patient folder.

## 2019-04-14 NOTE — PROGRESS NOTES
1046: K: 4.0, magnesium: 1.9. Antoine Almanzar MD VO: 40mEq Potassium Chloride tablet x1 dose, 2 g Magnesium Sulfate x2 doses Q2H, and CXR. readback x2. Medications administered.   1600: MD Jenelle VO: recheck BMP & Magnesium. readback x2. Labs sent, awaiting results

## 2019-04-14 NOTE — PROGRESS NOTES
04/13/2019  Grabiel Jaquez    Current provider:  Yovany Macdonald MD      I, Grabiel Jaquez, rounded on Kristopher Kevin to ensure all mechanical assist device settings (IABP or VAD) were appropriate and all parameters were within limits.  I was able to ensure all back up equipment was present, the staff had no issues, and the Perfusion Department daily rounding was complete.    9:12 PM

## 2019-04-14 NOTE — PROGRESS NOTES
Heparin infusion:    0840: ptt: 30.6- per minimal intensity heparin nomogram, increase 2 units/kg/hr--> new Dose: 22 units/kg/hr (SUSIE vang & SUSIE mendez verification & sign off)    1555: ptt: 32.8- per minimal intensity heparin nomogram increase 1 unit/kg/hr--> new dose: 23 units/kg/hr. (SUSIE Vang & URBAN Francois RN verification & sign off)          ** please see paper charting for RN signatures & dose changes

## 2019-04-15 PROBLEM — J96.01 ACUTE RESPIRATORY FAILURE WITH HYPOXIA AND HYPERCAPNIA: Status: RESOLVED | Noted: 2019-01-01 | Resolved: 2019-01-01

## 2019-04-15 PROBLEM — R23.9 ALTERATION IN SKIN INTEGRITY: Status: ACTIVE | Noted: 2019-01-01

## 2019-04-15 PROBLEM — J96.02 ACUTE RESPIRATORY FAILURE WITH HYPOXIA AND HYPERCAPNIA: Status: RESOLVED | Noted: 2019-01-01 | Resolved: 2019-01-01

## 2019-04-15 PROBLEM — I50.21 ACUTE SYSTOLIC HEART FAILURE: Status: ACTIVE | Noted: 2019-01-01

## 2019-04-15 PROBLEM — R57.0 CARDIOGENIC SHOCK: Status: ACTIVE | Noted: 2019-01-01

## 2019-04-15 NOTE — PLAN OF CARE
Problem: Adult Inpatient Plan of Care  Goal: Patient-Specific Goal (Individualization)  Outcome: Ongoing (interventions implemented as appropriate)  POC reviewed with patient and patient family. CVP: 8, 8, & 8 throughout shift; pink tinged & hematuria noted on assessment; UA sent with pending results. Urine color returning to yellow baseline post reynaga irrigation. No ectopy on monitor. Patient weaned to RA today; IS at bedside with pulmonary hygiene education provided. WOODS with minimal excertion still present. Lasix infusion initiated this evening with Amiodarone & lidocaine & Heparin gtts. HAPI, CAUTI, & CLABSI bundles diligently implemented. No skin breakdown noted on assessment. All medications thoroughly explained. No further questions at this time.

## 2019-04-15 NOTE — ASSESSMENT & PLAN NOTE
-Newly diagnosed ICM  -now s/p PCI to LAD 4/9/19 at OSH and PCI to ostial-prox LAD here 4/12/19 with recurrent polymorphic VT despite revascularization, with weaning IABP, on IV Amio and Lidocaine  -IABP placed at OSH 4/10/19, currently 1:1. SVO2 57 this am (calculated CO 6.4 CI 2.75 )  -CVP 8 , CXR improving on Lasix 80 IV BID but net even over last 24 hours. Agree with increasing Lasix from 80 IV BID today to 80 IV TID  -GDMT: BP limits uptitration of meds. Currently on carvedilol 3.125 BID, lisinopril 2.5 BID  -Last 2DE done at OSH 4/8/19 on presentation: LVEF 30%, LVEDD 4.7.  -Recommend repeat Echo.  -Cont current management with IABP 1:1, increased IV Lasix 80 TID for now.   -No ICD, as CM just diagnosed  -Advanced options: With VT and if LVEDD still small on repeat Echo, may not be candidate for LVAD. Could consider w/u for OHTx. Will review repeat Echo and see with staff tomorrow- more recs to follow.

## 2019-04-15 NOTE — PROGRESS NOTES
[facility]  Cardiac Electrophysiology  Progress Note    Admission Date: 4/8/2019  Code Status: Full Code   Attending Physician: Yovany Macdonald MD   Expected Discharge Date: 4/17/2019  Principal Problem:NSTEMI (non-ST elevated myocardial infarction)    Subjective:     Interval History: Pt went into PMVT on 4/13/19 at 5 PM. He was found to be unresponsive so CPR was initiated and he was cardioverted with 120 J. ROSC was quickly obtained. Pt was neurologically intact. His lidocaine drip was increased to 2 mg/min and amiodarone 1 mg/min. IABP setting returned to 1:1.     Review of Systems   Cardiovascular: Negative for chest pain, irregular heartbeat, leg swelling, near-syncope, orthopnea, palpitations and syncope.   Respiratory: Negative for cough and shortness of breath.    Gastrointestinal: Negative for abdominal pain, nausea and vomiting.   Neurological: Negative for dizziness, focal weakness, headaches, light-headedness and weakness.     Objective:     Vital Signs (Most Recent):  Temp: 98.4 °F (36.9 °C) (04/14/19 1915)  Pulse: 64 (04/15/19 0100)  Resp: (!) 30 (04/15/19 0100)  BP: (!) 126/91 (04/14/19 1915)  SpO2: 98 % (04/15/19 0100) Vital Signs (24h Range):  Temp:  [98.4 °F (36.9 °C)] 98.4 °F (36.9 °C)  Pulse:  [59-69] 64  Resp:  [20-31] 30  SpO2:  [95 %-100 %] 98 %  BP: (116-126)/(74-91) 126/91     Weight: 105.6 kg (232 lb 12.9 oz)  Body mass index is 31.57 kg/m².     SpO2: 98 %  O2 Device (Oxygen Therapy): nasal cannula    Physical Exam   Constitutional: He is oriented to person, place, and time. He appears well-developed and well-nourished. No distress.   HENT:   Head: Normocephalic and atraumatic.   Eyes: EOM are normal.   Neck:   TLC in RIJ    Cardiovascular: Normal rate, regular rhythm and normal heart sounds.   Pulmonary/Chest: Effort normal. Rales: on anterior auscultation.   Abdominal: Soft. Bowel sounds are normal.   Genitourinary:   Genitourinary Comments: Coleman in place    Musculoskeletal:   L fem IABP in place, c/d/i   Neurological: He is alert and oriented to person, place, and time.   Skin: Skin is warm and dry. No erythema.   Psychiatric: He has a normal mood and affect. Judgment and thought content normal.       Significant Labs: All pertinent lab results from the last 24 hours have been reviewed.    Significant Imaging: Echocardiogram:   Transthoracic echo (TTE) complete (Cupid Only):   Results for orders placed or performed during the hospital encounter of 04/08/19   Transthoracic echo (TTE) 2D with Color Flow   Result Value Ref Range    BSA 2.34 m2    TDI SEPTAL 0.04     LV LATERAL E/E' RATIO 13.38     LV SEPTAL E/E' RATIO 26.75     LA WIDTH 4.34 cm    AORTIC VALVE CUSP SEPERATION 2.58 cm    TDI LATERAL 0.08     PV PEAK VELOCITY 1.09 cm/s    LVIDD 4.70 3.5 - 6.0 cm    IVS 1.51 (A) 0.6 - 1.1 cm    PW 1.45 (A) 0.6 - 1.1 cm    Ao root annulus 3.58 cm    LVIDS 3.98 2.1 - 4.0 cm    FS 15 28 - 44 %    LA volume 59.68 cm3    Sinus 3.23 cm    STJ 2.78 cm    Ascending aorta 3.04 cm    LV mass 288.17 g    LA size 2.94 cm    RVDD 4.27 cm    TAPSE 2.00 cm    RV S' 16.79 m/s    Left Ventricle Relative Wall Thickness 0.62 cm    AV mean gradient 4.42 mmHg    AV valve area 2.99 cm2    AV Velocity Ratio 0.98     AV index (prosthetic) 0.92     E/A ratio 2.02     Mean e' 0.06     E wave decelartion time 151.03 msec    IVRT 0.10 msec    Pulm vein S/D ratio 0.69     LVOT diameter 2.03 cm    LVOT area 3.23 cm2    LVOT peak shree 6.3776216310 m/s    LVOT peak VTI 22.12 cm    Ao peak shree 1.23 m/s    Ao VTI 23.93 cm    LVOT stroke volume 71.56 cm3    AV peak gradient 6.05 mmHg    E/E' ratio 17.83     MV Peak E Shree 1.07 m/s    TR Max Shree 3.06 m/s    MV Peak A Shree 0.53 m/s    PV Peak S Shree 0.44 m/s    PV Peak D Shree 0.64 m/s    LV Systolic Volume 69.32 mL    LV Systolic Volume Index 30.4 mL/m2    LV Diastolic Volume 102.41 mL    LV Diastolic Volume Index 44.86 mL/m2    LA Volume Index 26.1 mL/m2    LV  Mass Index 126.2 g/m2    RA Major Axis 4.58 cm    Left Atrium Minor Axis 5.39 cm    Left Atrium Major Axis 5.62 cm    Triscuspid Valve Regurgitation Peak Gradient 37.45 mmHg    RA Width 4.30 cm    Right Atrial Pressure (from IVC) 3 mmHg    TV rest pulmonary artery pressure 40 mmHg     Assessment and Plan:     VT (ventricular tachycardia)  54 yo M with PMHx DM2 (A1c 9.1), HTN, DLD, active smoking (30 py) admitted to OSH on 4/8 with late presenting MI (trop > 6.6) s/p LHC demonstrating 80% LAD lesion s/p PCI/HSIRA. Post procedural course c/b recurrent polymorphic VT s/p multiple shocks, amio bolus/ infusion, and ultimately IABP  placement on 4/10. Transferred to [facility] [place] on 4/11 for higher level of care due to refractory PMVT. On 4/12/19, pt went to Cath lab and had PCI with SHIRA x1 to ostial- prox LAD region. He had episode of PMVT on 4/13/19 requiring 1 round of CPR and cardioversion of 120 Joules.       Recommendations:  -- Left ventricular unloading and management of cardiogenic shock per primary team. Recommend HTS consult regarding evaluation for advanced options (I.e. OHTx)   -- Continue lidocaine 2 mg/min and amiodarone 1 mg/min for now  -- Monitor and replete electrolytes, K>4, Mg>2.  -- Will need to decide on implantation of ICD before discharge versus LifeVest on discharge    (Delayed documentation from 4/14/19 due to Epic down-time).     Breana Mclaughlin MD  Cardiac Electrophysiology  [facility]

## 2019-04-15 NOTE — CONSULTS
A Wound  Consult was received from RN for skin tear to nose bridge  The patient was admitted with NSTEMI and has a past medical history of DM2, tobacco use, shortness of breath for 1 week.  Upon assessment, skin tear noted to bridge of nose from ambo bag on Saturday 4/13.  No erythema, scant amount of sanguineous drainage- controlled.   The plan of care was discussed with the patient/family and both verbalized understanding.   The Unit Nurse, Dusty,  was notified of the care provided and we discussed the treatment plan.   Nursing to continue care, wound care signing off.   Consultant Recommendations:   1. Bridge of nose- foam dressing- andino e 2 x week until resolved.        04/15/19 1210        Wound 04/13/19 1030 Laceration upper Nose   Date First Assessed/Time First Assessed: 04/13/19 1030   Pre-existing: No  Primary Wound Type: Laceration  Orientation: upper  Location: Nose   Wound Image    Wound WDL ex   Dressing Appearance Intact;Moist drainage   Drainage Amount Small   Drainage Characteristics/Odor Sanguineous;Bleeding controlled   Appearance Red;Moist   Tissue loss description Partial thickness   Red (%), Wound Tissue Color 100 %   Periwound Area Intact;Dry   Wound Edges Open   Wound Length (cm) 0.5 cm   Wound Width (cm) 0.7 cm   Wound Depth (cm) 0.1 cm   Wound Volume (cm^3) 0.04 cm^3   Wound Surface Area (cm^2) 0.35 cm^2   Dressing Changed;Applied;Foam   Dressing Change Due 04/18/19  (until resolved)

## 2019-04-15 NOTE — SUBJECTIVE & OBJECTIVE
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    Microalbuminuria 2/13/2017    Nodular prostate without urinary obstruction 8/14/2012    Tobacco use disorder 8/14/2012       Past Surgical History:   Procedure Laterality Date    ANGIOGRAM, CORONARY ARTERY N/A 4/12/2019    Performed by [facility]on [facility] at [facility] CATH LAB    CATHETERIZATION, HEART, BOTH LEFT AND RIGHT Bilateral 4/9/2019    Performed by Adrian Jacobo MD at [facility] CATH LAB    HERNIA REPAIR      INSERTION, INTRA-AORTIC BALLOON PUMP N/A 4/10/2019    Performed by Adrian Jacobo MD at [facility] CATH LAB    INSERTION, STENT, CORONARY ARTERY N/A 4/12/2019    Per[facility] by [facility]on [facility] at [facility] CATH LAB    IVUS, Coronary  4/9/2019    Performed by Adrian Jacobo MD at [facility] CATH LAB    Left heart cath Left 4/12/2019    Performed by [facility] at [facility] CATH LAB    Percutaneous coronary intervention N/A 4/9/2019    Performed by Adrian Jacobo MD at [facility] CATH LAB       Review of patient's allergies indicates:  No Known Allergies    Current Facility-Administered Medications   Medication    acetaminophen tablet 650 mg    ALPRAZolam tablet 1 mg    amiodarone 360 mg/200 mL (1.8 mg/mL) infusion    aspirin chewable tablet 81 mg    atorvastatin tablet 40 mg    azithromycin tablet 500 mg    bivalirudin (ANGIOMAX) 250 mg in dextrose 5 % 50 mL infusion    carvedilol tablet 3.125 mg    cefTRIAXone injection 1 g    clopidogrel tablet 75 mg    dextrose 50% injection 12.5 g    dextrose 50% injection 25 g    famotidine tablet 20 mg    furosemide injection 80 mg    glucagon (human recombinant) injection 1 mg    glucose chewable tablet 16 g    glucose chewable tablet 24 g     heparin 25,000 units in dextrose 5% 250 ml (100 units/mL) infusion MINIMAL INTENSITY nomogram - [facility]    influenza (FLUZONE QUADRIVALENT) vaccine 0.5 mL    insulin aspart U-100 pen 1-10 Units    insulin detemir U-100 pen 10 Units    lidocaine 2000 mg in dextrose 5% 250 mL infusion    lisinopril tablet 2.5 mg    morphine injection 2 mg    nitroGLYCERIN SL tablet 0.4 mg    ondansetron injection 4 mg    oxyCODONE-acetaminophen 5-325 mg per tablet 1 tablet    pneumoc 13-avery conj-dip cr(PF) (PREVNAR 13 (PF)) 0.5 mL    polyethylene glycol packet 17 g    potassium chloride SA CR tablet 40 mEq    promethazine (PHENERGAN) 6.25 mg in dextrose 5 % 50 mL IVPB    senna-docusate 8.6-50 mg per tablet 1 tablet    sodium chloride 0.9% flush 10 mL    tirofiban 12.5 mg in sodium chloride 0.9% 250 mL infusion     Family History     Problem Relation (Age of Onset)    Diabetes Mother        Tobacco Use    Smoking status: Current Every Day Smoker     Packs/day: 1.50     Types: Cigarettes    Smokeless tobacco: Never Used    Tobacco comment: Quick 3 days ago   Substance and Sexual Activity    Alcohol use: Yes    Drug use: Never    Sexual activity: Not Currently     Review of Systems   Constitutional: Positive for fatigue. Negative for chills and fever.   Respiratory: Positive for cough. Negative for chest tightness and shortness of breath.    Cardiovascular: Negative for leg swelling.   Gastrointestinal: Negative for constipation.   Neurological: Negative for dizziness and light-headedness.   Psychiatric/Behavioral: Negative for confusion.     Objective:     Vital Signs (Most Recent):  Temp: 97.9 °F (36.6 °C) (04/15/19 1100)  Pulse: 63 (04/15/19 1500)  Resp: (!) 22 (04/15/19 1500)  BP: (!) 121/92 (04/15/19 0700)  SpO2: 96 % (04/15/19 1500) Vital Signs (24h Range):  Temp:  [97.8 °F (36.6 °C)-98.4 °F (36.9 °C)] 97.9 °F (36.6 °C)  Pulse:  [54-69] 63  Resp:  [15-49] 22  SpO2:  [93 %-100 %] 96 %  BP: (121-126)/(74-92) 121/42      No data found.  Body mass index is 31.57 kg/m².      Intake/Output Summary (Last 24 hours) at 4/15/2019 1536  Last data filed at 4/15/2019 1500  Gross per 24 hour   Intake 2993.52 ml   Output 2704 ml   Net 289.52 ml       Physical Exam   Constitutional: He is oriented to person, place, and time. He appears well-developed and well-nourished.   Neck: Normal range of motion. Neck supple. No JVD present.   RIJ CVC in place   Cardiovascular: Normal rate and regular rhythm.   IABP in place, L fem  2+ bilat DP pulses   Pulmonary/Chest: Effort normal and breath sounds normal. He has no wheezes. He has no rales.   Abdominal: Soft. Bowel sounds are normal. There is no tenderness.   Musculoskeletal: He exhibits no edema.   Neurological: He is alert and oriented to person, place, and time.   Skin: Skin is warm.   Cool LLE       Significant Labs:  CBC:  Recent Labs   Lab 04/13/19  1709 04/14/19  0340 04/15/19  0318   WBC 15.06* 10.99 11.13   RBC 4.37* 3.95* 3.77*   HGB 12.0* 11.0* 10.5*   HCT 38.7* 34.2* 32.8*   * 441* 452*   MCV 89 87 87   MCH 27.5 27.8 27.9   MCHC 31.0* 32.2 32.0     BNP:  No results for input(s): BNP in the last 168 hours.    Invalid input(s): BNPTRIAGELBLO  CMP:  Recent Labs   Lab 04/13/19  1709 04/14/19  0340 04/14/19  1705 04/15/19  0318   *  --  151* 193* 154*   CALCIUM 9.5  --  8.9 8.9 8.4*   ALBUMIN 2.1*  --  2.1*  --  2.1*   PROT 6.5  --  6.3  --  6.1   *  --  134* 133* 131*   K 5.0   < > 4.0 4.1 3.9   CO2 24  --  26 27 27   CL 97  --  97 96 95   BUN 31*  --  30* 27* 26*   CREATININE 1.2  --  1.0 0.9 1.0   ALKPHOS 94  --  110  --  99   ALT 46*  --  62*  --  47*   AST 48*  --  47*  --  30   BILITOT 0.8  --  0.6  --  0.6    < > = values in this interval not displayed.      Coagulation:   Recent Labs   Lab 04/11/19 2218  04/13/19  1410 04/13/19  1709  04/14/19  1419 04/14/19  2153 04/15/19  0318   INR 1.1  1.1  --  1.1 1.1  --   --   --   --    APTT 25.2   < > 29.7 27.8   < >  32.8* 38.5* 40.7*    < > = values in this interval not displayed.     LDH:  No results for input(s): LDH in the last 72 hours.  Microbiology:  Microbiology Results (last 7 days)     Procedure Component Value Units Date/Time    Blood culture [[id number]] Collected:  04/12/19 1020    Order Status:  Completed Specimen:  Blood from Peripheral, Hand, Left Updated:  04/15/19 1412     Blood Culture, Routine No Growth to date     Blood Culture, Routine No Growth to date     Blood Culture, Routine No Growth to date     Blood Culture, Routine No Growth to date    Blood culture [[id number]] Collected:  04/12/19 1040    Order Status:  Completed Specimen:  Blood from Peripheral, Forearm, Left Updated:  04/15/19 1412     Blood Culture, Routine No Growth to date     Blood Culture, Routine No Growth to date     Blood Culture, Routine No Growth to date     Blood Culture, Routine No Growth to date    Culture, Respiratory with Gram Stain [[id number]] Collected:  04/13/19 1419    Order Status:  Completed Specimen:  Respiratory from Sputum, Expectorated Updated:  04/15/19 0757     Respiratory Culture No S aureus or Pseudomonas isolated.     Respiratory Culture Normal respiratory rosario     Gram Stain (Respiratory) >10 epithelial cells per low power field     Gram Stain (Respiratory) Many WBC's     Gram Stain (Respiratory) Few budding yeast     Gram Stain (Respiratory) Few Gram positive cocci     Gram Stain (Respiratory) Rare Gram negative rods    Blood culture [[id number]] Collected:  04/08/19 1129    Order Status:  Completed Specimen:  Blood Updated:  04/13/19 1503     Blood Culture, Routine No growth after 5 days.    Narrative:       Blood cultures x 2 different sites. 4 bottles total. Please  draw cultures before administering antibiotics.    Blood culture [[id number]] Collected:  04/08/19 1134    Order Status:  Completed Specimen:  Blood Updated:  04/13/19 1503     Blood Culture, Routine No growth after 5 days.    Narrative:       Blood  cultures from 2 different sites. 4 bottles total.  Please draw before starting antibiotics.          I have reviewed all pertinent labs within the past 24 hours.    Diagnostic Results:  I have reviewed all pertinent imaging results/findings within the past 24 hours.

## 2019-04-15 NOTE — CONSULTS
"[facility]  Heart Transplant  Consult Note    Patient Name: Kristopher Kevin  MRN: [id number]  Admission Date: 4/8/2019  Hospital Length of Stay: 7 days  Attending Physician: Yovany Macdonald MD  Primary Care Provider: Jhoan Lundy MD   Principal Problem:<principal problem not specified>    Consults  Subjective:     History of Present Illness:  52 y/o with h/o uncontrolled DM II (stopped taking metformin due to diarrhea ~2017 or 2018), tobacco and alcohol abuse presented to OSH on 4/8/19 with 1 week h/o "feeling bad" and having L abdominal pain followed by 1 week of CP that progressed to SOB and CP. Pt went to establish care with a PCP after the first week of symptoms- per daughter, a CXR was done and Lasix was sent into pharmacy but pt did not know Rx was sent in so didn't start it. Then the CP and SOB symptoms and pt went to ER ~ 1 week later.    At OSH:    He presented to the ER 4/8/19- troponin 6.6 , admitted with NSTEMI, HTN emergency, respiratory failure and newly diagnosed cardiomyopathy. He also had a brief episode of AF with RVR that converted to NSR following an amiodarone bolus. He was taken to the cath lab and found to have 80% prox LAD lesion s/p PCI with SHIRA. During angiography the patient had catheter induced VT upon entering the LV requiring cardioversion and RHC revealed PCWP 31 and SPAP 56.    On the evening of 4/9/19 the patient was found to be unresponsive, received CPR and defibrillation for pulseless VT. Accelerated idioventricular rhythm was suspected and the patient was started on IV amiodarone. On 4/10/19 the patient experienced VT again requiring two shocks and 3 rounds of chest compressions before ROSC. Rhythm strips present in the chart appear to be polymorphic VT. The patient experienced another VT event requiring defibrillation and was taken to the cath lab for IABP placement.     Transferred here, since admit here:  Started on IV Lasix for diuresis (net neg 5.4L " since admit). Was initially requiring Bipap but now on O2 via NC. Start on Abx for possible PNA/URI. EP also consulted- cont on Amio and Lidocaine drips  Went for C here: 4/12/19: s/p PCI Ostial-proximal LAD   4/13/19: IABP weaned to 1:2 and later that day had pulseless VT --> VF, requiring shock.     HTS consulted for possibility of advanced options.             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 CATH LAB    HERNIA REPAIR      INSERTION, INTRA-AORTIC BALLOON PUMP N/A 4/10/2019    Performed by Adrian Jacobo MD at [facility] CATH LAB    INSERTION, STENT, CORONARY ARTERY N/A 4/12/2019    [facility] by [facility] CATH LAB    IVUS, Coronary  4/9/2019    Performed by Adrian Jacobo MD at [facility] CATH LAB    Left heart cath Left 4/12/2019    [facility] by [facility] at [facility] CATH LAB    Percutaneous coronary intervention N/A 4/9/2019    Performed by Adrian Jacobo MD at [facility] CATH LAB       Review of patient's allergies indicates:  No Known Allergies    Current Facility-Administered Medications   Medication    acetaminophen tablet 650 mg    ALPRAZolam tablet 1 mg    amiodarone 360 mg/200 mL (1.8 mg/mL) infusion    aspirin chewable tablet 81 mg    atorvastatin tablet 40 mg    azithromycin tablet 500 mg    bivalirudin (ANGIOMAX) 250 mg  in dextrose 5 % 50 mL infusion    carvedilol tablet 3.125 mg    cefTRIAXone injection 1 g    clopidogrel tablet 75 mg    dextrose 50% injection 12.5 g    dextrose 50% injection 25 g    famotidine tablet 20 mg    furosemide injection 80 mg    glucagon (human recombinant) injection 1 mg    glucose chewable tablet 16 g    glucose chewable tablet 24 g    heparin 25,000 units in dextrose 5% 250 ml (100 units/mL) infusion MINIMAL INTENSITY nomogram - [facility]    influenza (FLUZONE QUADRIVALENT) vaccine 0.5 mL    insulin aspart U-100 pen 1-10 Units    insulin detemir U-100 pen 10 Units    lidocaine 2000 mg in dextrose 5% 250 mL infusion    lisinopril tablet 2.5 mg    morphine injection 2 mg    nitroGLYCERIN SL tablet 0.4 mg    ondansetron injection 4 mg    oxyCODONE-acetaminophen 5-325 mg per tablet 1 tablet    pneumoc 13-avery conj-dip cr(PF) (PREVNAR 13 (PF)) 0.5 mL    polyethylene glycol packet 17 g    potassium chloride SA CR tablet 40 mEq    promethazine (PHENERGAN) 6.25 mg in dextrose 5 % 50 mL IVPB    senna-docusate 8.6-50 mg per tablet 1 tablet    sodium chloride 0.9% flush 10 mL    tirofiban 12.5 mg in sodium chloride 0.9% 250 mL infusion     Family History     Problem Relation (Age of Onset)    Diabetes Mother        Tobacco Use    Smoking status: Current Every Day Smoker     Packs/day: 1.50     Types: Cigarettes    Smokeless tobacco: Never Used    Tobacco comment: Quick 3 days ago   Substance and Sexual Activity    Alcohol use: Yes    Drug use: Never    Sexual activity: Not Currently     Review of Systems   Constitutional: Positive for fatigue. Negative for chills and fever.   Respiratory: Positive for cough. Negative for chest tightness and shortness of breath.    Cardiovascular: Negative for leg swelling.   Gastrointestinal: Negative for constipation.   Neurological: Negative for dizziness and light-headedness.   Psychiatric/Behavioral: Negative for confusion.     Objective:     Vital  Signs (Most Recent):  Temp: 97.9 °F (36.6 °C) (04/15/19 1100)  Pulse: 63 (04/15/19 1500)  Resp: (!) 22 (04/15/19 1500)  BP: (!) 121/92 (04/15/19 0700)  SpO2: 96 % (04/15/19 1500) Vital Signs (24h Range):  Temp:  [97.8 °F (36.6 °C)-98.4 °F (36.9 °C)] 97.9 °F (36.6 °C)  Pulse:  [54-69] 63  Resp:  [15-49] 22  SpO2:  [93 %-100 %] 96 %  BP: (121-126)/(74-92) 121/92     No data found.  Body mass index is 31.57 kg/m².      Intake/Output Summary (Last 24 hours) at 4/15/2019 1536  Last data filed at 4/15/2019 1500  Gross per 24 hour   Intake 2993.52 ml   Output 2704 ml   Net 289.52 ml       Physical Exam   Constitutional: He is oriented to person, place, and time. He appears well-developed and well-nourished.   Neck: Normal range of motion. Neck supple. No JVD present.   RIJ CVC in place   Cardiovascular: Normal rate and regular rhythm.   IABP in place, L fem  2+ bilat DP pulses   Pulmonary/Chest: Effort normal and breath sounds normal. He has no wheezes. He has no rales.   Abdominal: Soft. Bowel sounds are normal. There is no tenderness.   Musculoskeletal: He exhibits no edema.   Neurological: He is alert and oriented to person, place, and time.   Skin: Skin is warm.   Cool LLE       Significant Labs:  CBC:  Recent Labs   Lab 04/13/19  1709 04/14/19  0340 04/15/19  0318   WBC 15.06* 10.99 11.13   RBC 4.37* 3.95* 3.77*   HGB 12.0* 11.0* 10.5*   HCT 38.7* 34.2* 32.8*   * 441* 452*   MCV 89 87 87   MCH 27.5 27.8 27.9   MCHC 31.0* 32.2 32.0     BNP:  No results for input(s): BNP in the last 168 hours.    Invalid input(s): SILVIAGELSHRADDHA  CMP:  Recent Labs   Lab 04/13/19  1709  04/14/19  0340 04/14/19  1705 04/15/19  0318   *  --  151* 193* 154*   CALCIUM 9.5  --  8.9 8.9 8.4*   ALBUMIN 2.1*  --  2.1*  --  2.1*   PROT 6.5  --  6.3  --  6.1   *  --  134* 133* 131*   K 5.0   < > 4.0 4.1 3.9   CO2 24  --  26 27 27   CL 97  --  97 96 95   BUN 31*  --  30* 27* 26*   CREATININE 1.2  --  1.0 0.9 1.0   ALKPHOS 94  --   110  --  99   ALT 46*  --  62*  --  47*   AST 48*  --  47*  --  30   BILITOT 0.8  --  0.6  --  0.6    < > = values in this interval not displayed.      Coagulation:   Recent Labs   Lab 04/11/19  2218  04/13/19  1410 04/13/19  1709  04/14/19  1419 04/14/19  2153 04/15/19  0318   INR 1.1  1.1  --  1.1 1.1  --   --   --   --    APTT 25.2   < > 29.7 27.8   < > 32.8* 38.5* 40.7*    < > = values in this interval not displayed.     LDH:  No results for input(s): LDH in the last 72 hours.  Microbiology:  Microbiology Results (last 7 days)     Procedure Component Value Units Date/Time    Blood culture [[id number]] Collected:  04/12/19 1020    Order Status:  Completed Specimen:  Blood from Peripheral, Hand, Left Updated:  04/15/19 1412     Blood Culture, Routine No Growth to date     Blood Culture, Routine No Growth to date     Blood Culture, Routine No Growth to date     Blood Culture, Routine No Growth to date    Blood culture [[id number]] Collected:  04/12/19 1040    Order Status:  Completed Specimen:  Blood from Peripheral, Forearm, Left Updated:  04/15/19 1412     Blood Culture, Routine No Growth to date     Blood Culture, Routine No Growth to date     Blood Culture, Routine No Growth to date     Blood Culture, Routine No Growth to date    Culture, Respiratory with Gram Stain [[id number]] Collected:  04/13/19 1419    Order Status:  Completed Specimen:  Respiratory from Sputum, Expectorated Updated:  04/15/19 0757     Respiratory Culture No S aureus or Pseudomonas isolated.     Respiratory Culture Normal respiratory rosario     Gram Stain (Respiratory) >10 epithelial cells per low power field     Gram Stain (Respiratory) Many WBC's     Gram Stain (Respiratory) Few budding yeast     Gram Stain (Respiratory) Few Gram positive cocci     Gram Stain (Respiratory) Rare Gram negative rods    Blood culture [[id number]] Collected:  04/08/19 1129    Order Status:  Completed Specimen:  Blood Updated:  04/13/19 1503     Blood Culture,  Routine No growth after 5 days.    Narrative:       Blood cultures x 2 different sites. 4 bottles total. Please  draw cultures before administering antibiotics.    Blood culture [[id number]] Collected:  04/08/19 1134    Order Status:  Completed Specimen:  Blood Updated:  04/13/19 1503     Blood Culture, Routine No growth after 5 days.    Narrative:       Blood cultures from 2 different sites. 4 bottles total.  Please draw before starting antibiotics.          I have reviewed all pertinent labs within the past 24 hours.    Diagnostic Results:  I have reviewed all pertinent imaging results/findings within the past 24 hours.    Assessment/Plan:     Acute systolic heart failure  -Newly diagnosed ICM  -now s/p PCI to LAD 4/9/19 at OSH and PCI to ostial-prox LAD here 4/12/19 with recurrent polymorphic VT despite revascularization, with weaning IABP, on IV Amio and Lidocaine  -IABP placed at OSH 4/10/19, currently 1:1. SVO2 57 this am (calculated CO 6.4 CI 2.75 )  -CVP 8 , CXR improving on Lasix 80 IV BID but net even over last 24 hours. Agree with increasing Lasix from 80 IV BID today to 80 IV TID  -GDMT: BP limits uptitration of meds. Currently on carvedilol 3.125 BID, lisinopril 2.5 BID  -Last 2DE done at OSH 4/8/19 on presentation: LVEF 30%, LVEDD 4.7.  -Recommend repeat Echo.  -Cont current management with IABP 1:1, increased IV Lasix 80 TID for now.   -No ICD, as CM just diagnosed  -Advanced options: With VT and if LVEDD still small on repeat Echo, may not be candidate for LVAD. Could consider w/u for OHTx. Will review repeat Echo and see with staff tomorrow- more recs to follow.       Thank you for your consult. I will follow-up with patient. Please contact us if you have any additional questions.    Skylar Jauregui PA-C  Heart Transplant  [facility]

## 2019-04-15 NOTE — ASSESSMENT & PLAN NOTE
late presenting MI  Status post PCI of the LAD with residual disease, second angiogram performed after transfer here.   Continue DAPT with ASA, clopidogrel  Started on low dose Coreg, Lisinopril for GDMT    Balloon pump support in place.     HTS consulted to evaluate for advanced options given episodes of V tach >48 hours post MI

## 2019-04-15 NOTE — PLAN OF CARE
Problem: Adult Inpatient Plan of Care  Goal: Plan of Care Review  Outcome: Ongoing (interventions implemented as appropriate)  No changes overnight, VSS. Remains on lidocaine, Amio, and heparin gtts. CVP 8-9, IABP 1:1, MAP 70s, AUG 110s. Denies any pain and/or discomfort, frequent vitals and assessment per flowsheet, plan of care reviewed with Mr. Kevin and his brother, questions/concerns addressed, will continue to monitor pt.

## 2019-04-15 NOTE — ASSESSMENT & PLAN NOTE
Tracings in chart appear to be polymorphic VT, repeat episode here >48 hours post MI.  Reducing IV amiodarone infusion 0.5 mg/min, continue Lidocaine at 2.   IABP remains at 1:1 for support.     Suspect ischemic scar as likely culprit lesion.   Cardiac monitoring  Pacer pads on patient  Monitor/replete electrolytes  K > 4, Mg > 2

## 2019-04-15 NOTE — SUBJECTIVE & OBJECTIVE
Interval History: Patient remains on IABP at 1:1, Lidocaine at 2 and amiodarone at 1. No new complaints, still gets short of breath with movement. Denies chest pain, fevers.     Review of Systems   Constitution: Negative for chills and fever.   HENT: Negative for hearing loss.    Eyes: Negative for visual disturbance.   Cardiovascular: Negative for chest pain, leg swelling and orthopnea.   Respiratory: Positive for cough and shortness of breath. Negative for sputum production.    Musculoskeletal: Negative for joint swelling and muscle cramps.   Gastrointestinal: Negative for nausea and vomiting.   Neurological: Negative for headaches.   Psychiatric/Behavioral: Negative for altered mental status and substance abuse.     Objective:     Vital Signs (Most Recent):  Temp: 97.9 °F (36.6 °C) (04/15/19 1100)  Pulse: 63 (04/15/19 1500)  Resp: (!) 22 (04/15/19 1500)  BP: (!) 121/92 (04/15/19 0700)  SpO2: 96 % (04/15/19 1500) Vital Signs (24h Range):  Temp:  [97.8 °F (36.6 °C)-98.4 °F (36.9 °C)] 97.9 °F (36.6 °C)  Pulse:  [54-69] 63  Resp:  [15-49] 22  SpO2:  [93 %-100 %] 96 %  BP: (121-126)/(74-92) 121/92     Weight: 105.6 kg (232 lb 12.9 oz)  Body mass index is 31.57 kg/m².     SpO2: 96 %  O2 Device (Oxygen Therapy): BiPAP      Intake/Output Summary (Last 24 hours) at 4/15/2019 1536  Last data filed at 4/15/2019 1500  Gross per 24 hour   Intake 2993.52 ml   Output 2704 ml   Net 289.52 ml       Lines/Drains/Airways     Central Venous Catheter Line                 Percutaneous Central Line Insertion/Assessment - triple lumen  04/12/19 1259 right internal jugular 3 days          Drain                 Urethral Catheter 04/08/19 0630 Non-latex;Straight-tip 16 Fr. 7 days          Line                 IABP 04/10/19 8.0 Fr. 5 days          Peripheral Intravenous Line                 Peripheral IV - Single Lumen 04/11/19 0800 Posterior;Right Hand 4 days         Peripheral IV - Single Lumen 04/11/19 1200 20 G Left;Posterior Forearm 4  days         Peripheral IV - Single Lumen 04/12/19 0100 18 G Right Forearm 3 days                Physical Exam   Constitutional: He is oriented to person, place, and time. He appears well-developed and well-nourished. No distress.   HENT:   Head: Normocephalic and atraumatic.   Mouth/Throat: Oropharynx is clear and moist.   Eyes: EOM are normal.   Neck:   TLC in RIJ    Cardiovascular: Normal rate, regular rhythm and normal heart sounds.   Pulmonary/Chest: Effort normal. Rales: on anterior auscultation.   Abdominal: Soft. Bowel sounds are normal. He exhibits no distension. There is no tenderness.   Genitourinary:   Genitourinary Comments: Coleman in place   Musculoskeletal: He exhibits no edema.   L fem IABP in place, c/d/i   Neurological: He is alert and oriented to person, place, and time.   Skin: Skin is warm and dry. No erythema.   Psychiatric: He has a normal mood and affect. Judgment and thought content normal.       Significant Labs:   Recent Lab Results       04/15/19  1314   04/15/19  1145   04/15/19  1051   04/15/19  0825   04/15/19  0749        Albumin               Alkaline Phosphatase               Allens Test     N/A         ALT               Anion Gap               aPTT               AST               Baso #               Basophil%               Total Bilirubin               Site     Other         BUN, Bld               Calcium               Chloride               CO2               Creatinine               Differential Method               eGFR if                eGFR if non                Eos #               Eosinophil%               Glucose               Gran # (ANC)               Gran%               Hematocrit               Hemoglobin               Immature Grans (Abs)               Immature Granulocytes               Lymph #               Lymph%               Magnesium               MCH               MCHC               MCV               Mono #               Mono%                MPV               nRBC               Osmolality   283           Osmolality, Ur         691  Comment:  The random urine reference ranges provided were established   for 24 hour urine collections.  No reference ranges exist for  random urine specimens.  Correlate clinically.       Phosphorus               Platelets               POC BE     2         POC HCO3     27.2         POC PCO2     44.1         POC PH     7.399         POC PO2     30         POC SATURATED O2     57         POC TCO2     29         POCT Glucose 174     202       Potassium               Total Protein               RBC               RDW               Sample     VENOUS         Sodium               Sodium Urine Random         57  Comment:  The random urine reference ranges provided were established   for 24 hour urine collections.  No reference ranges exist for  random urine specimens.  Correlate clinically.       WBC                                04/15/19  0634   04/15/19  0318   04/15/19  0006   04/14/19  2158   04/14/19  2153        Albumin   2.1           Alkaline Phosphatase   99           Allens Test               ALT   47           Anion Gap   9           aPTT   40.7  Comment:  aPTT therapeutic range = 39-69 seconds     38.5  Comment:  aPTT therapeutic range = 39-69 seconds     AST   30           Baso #   0.03           Basophil%   0.3           Total Bilirubin   0.6  Comment:  For infants and newborns, interpretation of results should be based  on gestational age, weight and in agreement with clinical  observations.  Premature Infant recommended reference ranges:  Up to 24 hours.............<8.0 mg/dL  Up to 48 hours............<12.0 mg/dL  3-5 days..................<15.0 mg/dL  6-29 days.................<15.0 mg/dL             Site               BUN, Bld   26           Calcium   8.4           Chloride   95           CO2   27           Creatinine   1.0           Differential Method   Automated           eGFR if    >60.0            eGFR if non    >60.0  Comment:  Calculation used to obtain the estimated glomerular filtration  rate (eGFR) is the CKD-EPI equation.              Eos #   0.4           Eosinophil%   3.2           Glucose   154           Gran # (ANC)   8.2           Gran%   73.4           Hematocrit   32.8           Hemoglobin   10.5           Immature Grans (Abs)   0.05  Comment:  Mild elevation in immature granulocytes is non specific and   can be seen in a variety of conditions including stress response,   acute inflammation, trauma and pregnancy. Correlation with other   laboratory and clinical findings is essential.             Immature Granulocytes   0.4           Lymph #   1.9           Lymph%   16.8           Magnesium   2.3           MCH   27.9           MCHC   32.0           MCV   87           Mono #   0.7           Mono%   5.9           MPV   9.5           nRBC   0           Osmolality               Osmolality, Ur               Phosphorus   3.8           Platelets   452           POC BE               POC HCO3               POC PCO2               POC PH               POC PO2               POC SATURATED O2               POC TCO2               POCT Glucose 185   180 190       Potassium   3.9           Total Protein   6.1           RBC   3.77           RDW   13.2           Sample               Sodium   131           Sodium Urine Random               WBC   11.13                            04/14/19  1705   04/14/19  1702        Albumin         Alkaline Phosphatase         Allens Test         ALT         Anion Gap 10       aPTT         AST         Baso #         Basophil%         Total Bilirubin         Site         BUN, Bld 27       Calcium 8.9       Chloride 96       CO2 27       Creatinine 0.9       Differential Method         eGFR if  >60.0       eGFR if non  >60.0  Comment:  Calculation used to obtain the estimated glomerular filtration  rate (eGFR) is the CKD-EPI  equation.          Eos #         Eosinophil%         Glucose 193       Gran # (ANC)         Gran%         Hematocrit         Hemoglobin         Immature Grans (Abs)         Immature Granulocytes         Lymph #         Lymph%         Magnesium 2.9       MCH         MCHC         MCV         Mono #         Mono%         MPV         nRBC         Osmolality         Osmolality, Ur         Phosphorus         Platelets         POC BE         POC HCO3         POC PCO2         POC PH         POC PO2         POC SATURATED O2         POC TCO2         POCT Glucose   209     Potassium 4.1       Total Protein         RBC         RDW         Sample         Sodium 133       Sodium Urine Random         WBC               Significant Imaging: CXR from previous days reveiewed, persistent bilateral opacification

## 2019-04-15 NOTE — PROGRESS NOTES
[facility]  Cardiac Electrophysiology  Progress Note    Admission Date: 4/8/2019  Code Status: Full Code   Attending Physician: Yovany Macdonald MD   Expected Discharge Date: 4/17/2019  Principal Problem:NSTEMI (non-ST elevated myocardial infarction)    Subjective:     Interval History: NAEON. No further episodes of VT/VF since 4/13. Remains on IABP 1:1, amio 1 mg/min, and lido 2 mg/min. Clinically feels well this morning aside from some musculoskeletal chest pain on R side elicited when coughs.    Review of Systems   Cardiovascular: Negative for chest pain, irregular heartbeat, leg swelling, near-syncope, orthopnea, palpitations and syncope.   Respiratory: Negative for cough and shortness of breath.    Gastrointestinal: Negative for abdominal pain, nausea and vomiting.   Neurological: Negative for dizziness, focal weakness, headaches, light-headedness and weakness.     Objective:     Vital Signs (Most Recent):  Temp: 98 °F (36.7 °C) (04/15/19 0700)  Pulse: 69 (04/15/19 0900)  Resp: (!) 31 (04/15/19 0900)  BP: (!) 121/92 (04/15/19 0700)  SpO2: (!) 94 % (04/15/19 0900) Vital Signs (24h Range):  Temp:  [97.8 °F (36.6 °C)-98.4 °F (36.9 °C)] 98 °F (36.7 °C)  Pulse:  [57-69] 69  Resp:  [20-42] 31  SpO2:  [94 %-100 %] 94 %  BP: (121-126)/(74-92) 121/92     Weight: 105.6 kg (232 lb 12.9 oz)  Body mass index is 31.57 kg/m².     SpO2: (!) 94 %  O2 Device (Oxygen Therapy): nasal cannula    Physical Exam   Constitutional: He is oriented to person, place, and time. He appears well-developed and well-nourished. No distress.   HENT:   Head: Normocephalic and atraumatic.   Eyes: EOM are normal.   Neck:   TLC in RIJ    Cardiovascular: Normal rate, regular rhythm and normal heart sounds.   Pulmonary/Chest: Effort normal. Rales: on anterior auscultation.   Abdominal: Soft. Bowel sounds are normal.   Genitourinary:   Genitourinary Comments: Coleman in place   Musculoskeletal:   L fem IABP in place, c/d/i    Neurological: He is alert and oriented to person, place, and time.   Skin: Skin is warm and dry. No erythema.   Psychiatric: He has a normal mood and affect. Judgment and thought content normal.       Significant Labs: All pertinent lab results from the last 24 hours have been reviewed.    Significant Imaging: Echocardiogram:   Transthoracic echo (TTE) complete (Cupid Only):   Results for orders placed or performed during the hospital encounter of 04/08/19   Transthoracic echo (TTE) 2D with Color Flow   Result Value Ref Range    BSA 2.34 m2    TDI SEPTAL 0.04     LV LATERAL E/E' RATIO 13.38     LV SEPTAL E/E' RATIO 26.75     LA WIDTH 4.34 cm    AORTIC VALVE CUSP SEPERATION 2.58 cm    TDI LATERAL 0.08     PV PEAK VELOCITY 1.09 cm/s    LVIDD 4.70 3.5 - 6.0 cm    IVS 1.51 (A) 0.6 - 1.1 cm    PW 1.45 (A) 0.6 - 1.1 cm    Ao root annulus 3.58 cm    LVIDS 3.98 2.1 - 4.0 cm    FS 15 28 - 44 %    LA volume 59.68 cm3    Sinus 3.23 cm    STJ 2.78 cm    Ascending aorta 3.04 cm    LV mass 288.17 g    LA size 2.94 cm    RVDD 4.27 cm    TAPSE 2.00 cm    RV S' 16.79 m/s    Left Ventricle Relative Wall Thickness 0.62 cm    AV mean gradient 4.42 mmHg    AV valve area 2.99 cm2    AV Velocity Ratio 0.98     AV index (prosthetic) 0.92     E/A ratio 2.02     Mean e' 0.06     E wave decelartion time 151.03 msec    IVRT 0.10 msec    Pulm vein S/D ratio 0.69     LVOT diameter 2.03 cm    LVOT area 3.23 cm2    LVOT peak shree 7.3142880971 m/s    LVOT peak VTI 22.12 cm    Ao peak shree 1.23 m/s    Ao VTI 23.93 cm    LVOT stroke volume 71.56 cm3    AV peak gradient 6.05 mmHg    E/E' ratio 17.83     MV Peak E Shree 1.07 m/s    TR Max Shree 3.06 m/s    MV Peak A Shree 0.53 m/s    PV Peak S Shree 0.44 m/s    PV Peak D Shree 0.64 m/s    LV Systolic Volume 69.32 mL    LV Systolic Volume Index 30.4 mL/m2    LV Diastolic Volume 102.41 mL    LV Diastolic Volume Index 44.86 mL/m2    LA Volume Index 26.1 mL/m2    LV Mass Index 126.2 g/m2    RA Major Axis 4.58 cm     Left Atrium Minor Axis 5.39 cm    Left Atrium Major Axis 5.62 cm    Triscuspid Valve Regurgitation Peak Gradient 37.45 mmHg    RA Width 4.30 cm    Right Atrial Pressure (from IVC) 3 mmHg    TV rest pulmonary artery pressure 40 mmHg     Assessment and Plan:     VT (ventricular tachycardia)  52 yo M with PMHx DM2 (A1c 9.1), HTN, DLD, active smoking (30 py) admitted to OSH on 4/8 with late presenting MI (trop > 6.6) s/p LHC demonstrating 80% LAD lesion s/p PCI/SHIRA. Post procedural course c/b recurrent polymorphic VT s/p multiple shocks, amio bolus/ infusion, and ultimately IABP  placement on 4/10. Transferred to C on 4/11 for higher level of care due to refractory PMVT. On 4/12/19, pt went to Cath lab and had PCI with SHIRA x1 to ostial- prox LAD region. He had episode of PMVT on 4/13/19 requiring 1 round of CPR and cardioversion of 120 Joules.       Recommendations:  -- Left ventricular unloading and management of cardiogenic shock per primary team. -- Recommend HTS consult regarding evaluation for advanced options (I.e. OHTx) due to recurrent PMVT/VF despite revascularization.  -- Continue lidocaine 2 mg/min; decrease amiodarone to 0.5 mg/min today.  -- Monitor and replete electrolytes, K>4, Mg>2.  -- Will need to decide on implantation of ICD before discharge versus LifeVest.       Amy Womack MD  Cardiac Electrophysiology  [facility]

## 2019-04-15 NOTE — PROGRESS NOTES
04/15/19 1100   Genitourinary   Genitourinary WDL ex   Voiding Characteristics urethral catheter (bladder)   Urine Characteristics pink   team at bedside to assess. Previously concentrated yellow urine noted. UA ordered and implemented

## 2019-04-15 NOTE — PLAN OF CARE
Problem: Adult Inpatient Plan of Care  Goal: Plan of Care Review  Outcome: Ongoing (interventions implemented as appropriate)  POC reviewed with patient and patient family.  pink tinged & hematuria noted on assessment; UA sent; team updated, labs collected. Urine color returning to yellow baseline post reynaga irrigation.unable to wean O2 off today;  IS at bedside with pulmonary hygiene education provided. WOODS with minimal excertion still present. Amiodarone & lidocaine & Heparin gtts infusing per order. Cardiac arrest this shift; AAOx4 with no neuro deficits post arrest. Currently wearing bipap to meet SpO2 goals post arrest. Family at bedside & updated per team. HAPI, CAUTI, & CLABSI bundles diligently implemented. No skin breakdown noted on assessment. All medications thoroughly explained. No further questions at this time.

## 2019-04-15 NOTE — HPI
"54 y/o with h/o uncontrolled DM II (stopped taking metformin due to diarrhea ~2017 or 2018), tobacco and alcohol abuse presented to OSH on 4/8/19 with 1 week h/o "feeling bad" and having L abdominal pain followed by 1 week of CP that progressed to SOB and CP. Pt went to establish care with a PCP after the first week of symptoms- per daughter, a CXR was done and Lasix was sent into pharmacy but pt did not know Rx was sent in so didn't start it. Then the CP and SOB symptoms and pt went to ER ~ 1 week later.    At OSH:    He presented to the ER 4/8/19- troponin 6.6 , admitted with NSTEMI, HTN emergency, respiratory failure and newly diagnosed cardiomyopathy. He also had a brief episode of AF with RVR that converted to NSR following an amiodarone bolus. He was taken to the cath lab and found to have 80% prox LAD lesion s/p PCI with SHIRA. During angiography the patient had catheter induced VT upon entering the LV requiring cardioversion and RHC revealed PCWP 31 and SPAP 56.    On the evening of 4/9/19 the patient was found to be unresponsive, received CPR and defibrillation for pulseless VT. Accelerated idioventricular rhythm was suspected and the patient was started on IV amiodarone. On 4/10/19 the patient experienced VT again requiring two shocks and 3 rounds of chest compressions before ROSC. Rhythm strips present in the chart appear to be polymorphic VT. The patient experienced another VT event requiring defibrillation and was taken to the cath lab for IABP placement.     Transferred here, since admit here:  Started on IV Lasix for diuresis (net neg 5.4L since admit). Was initially requiring Bipap but now on O2 via NC. Start on Abx for possible PNA/URI. EP also consulted- cont on Amio and Lidocaine drips  Went for C here: 4/12/19: s/p PCI Ostial-proximal LAD   4/13/19: IABP weaned to 1:2 and later that day had pulseless VT --> VF, requiring shock.     HTS consulted for possibility of advanced options.           "

## 2019-04-15 NOTE — SUBJECTIVE & OBJECTIVE
Interval History: NAEON. No further episodes of VT/VF since 4/13. Remains on IABP 1:1, amio 1 mg/min, and lido 2 mg/min. Clinically feels well this morning aside from some musculoskeletal chest pain on R side elicited when coughs.    Review of Systems   Cardiovascular: Negative for chest pain, irregular heartbeat, leg swelling, near-syncope, orthopnea, palpitations and syncope.   Respiratory: Negative for cough and shortness of breath.    Gastrointestinal: Negative for abdominal pain, nausea and vomiting.   Neurological: Negative for dizziness, focal weakness, headaches, light-headedness and weakness.     Objective:     Vital Signs (Most Recent):  Temp: 98 °F (36.7 °C) (04/15/19 0700)  Pulse: 69 (04/15/19 0900)  Resp: (!) 31 (04/15/19 0900)  BP: (!) 121/92 (04/15/19 0700)  SpO2: (!) 94 % (04/15/19 0900) Vital Signs (24h Range):  Temp:  [97.8 °F (36.6 °C)-98.4 °F (36.9 °C)] 98 °F (36.7 °C)  Pulse:  [57-69] 69  Resp:  [20-42] 31  SpO2:  [94 %-100 %] 94 %  BP: (121-126)/(74-92) 121/92     Weight: 105.6 kg (232 lb 12.9 oz)  Body mass index is 31.57 kg/m².     SpO2: (!) 94 %  O2 Device (Oxygen Therapy): nasal cannula    Physical Exam   Constitutional: He is oriented to person, place, and time. He appears well-developed and well-nourished. No distress.   HENT:   Head: Normocephalic and atraumatic.   Eyes: EOM are normal.   Neck:   TLC in RIJ    Cardiovascular: Normal rate, regular rhythm and normal heart sounds.   Pulmonary/Chest: Effort normal. Rales: on anterior auscultation.   Abdominal: Soft. Bowel sounds are normal.   Genitourinary:   Genitourinary Comments: Coleman in place   Musculoskeletal:   L fem IABP in place, c/d/i   Neurological: He is alert and oriented to person, place, and time.   Skin: Skin is warm and dry. No erythema.   Psychiatric: He has a normal mood and affect. Judgment and thought content normal.       Significant Labs: All pertinent lab results from the last 24 hours have been  reviewed.    Significant Imaging: Echocardiogram:   Transthoracic echo (TTE) complete (Cupid Only):   Results for orders placed or performed during the hospital encounter of 04/08/19   Transthoracic echo (TTE) 2D with Color Flow   Result Value Ref Range    BSA 2.34 m2    TDI SEPTAL 0.04     LV LATERAL E/E' RATIO 13.38     LV SEPTAL E/E' RATIO 26.75     LA WIDTH 4.34 cm    AORTIC VALVE CUSP SEPERATION 2.58 cm    TDI LATERAL 0.08     PV PEAK VELOCITY 1.09 cm/s    LVIDD 4.70 3.5 - 6.0 cm    IVS 1.51 (A) 0.6 - 1.1 cm    PW 1.45 (A) 0.6 - 1.1 cm    Ao root annulus 3.58 cm    LVIDS 3.98 2.1 - 4.0 cm    FS 15 28 - 44 %    LA volume 59.68 cm3    Sinus 3.23 cm    STJ 2.78 cm    Ascending aorta 3.04 cm    LV mass 288.17 g    LA size 2.94 cm    RVDD 4.27 cm    TAPSE 2.00 cm    RV S' 16.79 m/s    Left Ventricle Relative Wall Thickness 0.62 cm    AV mean gradient 4.42 mmHg    AV valve area 2.99 cm2    AV Velocity Ratio 0.98     AV index (prosthetic) 0.92     E/A ratio 2.02     Mean e' 0.06     E wave decelartion time 151.03 msec    IVRT 0.10 msec    Pulm vein S/D ratio 0.69     LVOT diameter 2.03 cm    LVOT area 3.23 cm2    LVOT peak shree 6.9477665423 m/s    LVOT peak VTI 22.12 cm    Ao peak shree 1.23 m/s    Ao VTI 23.93 cm    LVOT stroke volume 71.56 cm3    AV peak gradient 6.05 mmHg    E/E' ratio 17.83     MV Peak E Shree 1.07 m/s    TR Max Shree 3.06 m/s    MV Peak A Shree 0.53 m/s    PV Peak S Shree 0.44 m/s    PV Peak D Shree 0.64 m/s    LV Systolic Volume 69.32 mL    LV Systolic Volume Index 30.4 mL/m2    LV Diastolic Volume 102.41 mL    LV Diastolic Volume Index 44.86 mL/m2    LA Volume Index 26.1 mL/m2    LV Mass Index 126.2 g/m2    RA Major Axis 4.58 cm    Left Atrium Minor Axis 5.39 cm    Left Atrium Major Axis 5.62 cm    Triscuspid Valve Regurgitation Peak Gradient 37.45 mmHg    RA Width 4.30 cm    Right Atrial Pressure (from IVC) 3 mmHg    TV rest pulmonary artery pressure 40 mmHg

## 2019-04-15 NOTE — ASSESSMENT & PLAN NOTE
CVP Elevated to 8 today, evidence of likely pulmonary edema on CXR. Still requiring supplemental oxygen.  Balloon pump in place.   Increase Lasix to TID, has been net even for last 24 hours.

## 2019-04-15 NOTE — PROGRESS NOTES
04/15/2019  Grabiel Jaquez    Current provider:  Yovany Macdonald MD      I, Grabiel Jaquez, rounded on Kristopher Kevin to ensure all mechanical assist device settings (IABP or VAD) were appropriate and all parameters were within limits.  I was able to ensure all back up equipment was present, the staff had no issues, and the Perfusion Department daily rounding was complete.    10:15 AM

## 2019-04-15 NOTE — PHYSICIAN QUERY
PT Name: Kristopher Kevin  MR #: [id number]     Physician Query Form - Diabetic Condition Clarification     CDS/: Brissa Pearl               Contact information: [email or web address]    This form is a permanent document in the medical record.     Query Date: April 15, 2019    By submitting this query, we are merely seeking further clarification of documentation to reflect the severity of illness of your patient. Please utilize your independent clinical judgment when addressing the question(s) below.    The Medical record reflects the following:     Indicators   Supporting Clinical Findings Location in Medical Record   x Diabetes uncontrolled documented Diabetes mellitus type II, uncontrolled  Continue insulin detemir 10 units nightly  Target -180 while hospitalized     Cardiology note 4/13    x Lab Value(s), POCT glucose value(s) Hemoglobin A1C - 9.1   Estimated Avg glucose 214  Labs 4/8     Treatment                                 Medication      Other       Provider, please specify the meaning of the term uncontrolled:    [  x ] Diabetes mellitus Type 2 with hyperglycemia   [   ] Other diabetes complication (please specify): ____________   [   ]  Clinically Undetermined       Please document in your progress notes daily for the duration of treatment until resolved, and include in your discharge summary.

## 2019-04-15 NOTE — ASSESSMENT & PLAN NOTE
54 yo M with PMHx DM2 (A1c 9.1), HTN, DLD, active smoking (30 py) admitted to OSH on 4/8 with late presenting MI (trop > 6.6) s/p LHC demonstrating 80% LAD lesion s/p PCI/SHIRA. Post procedural course c/b recurrent polymorphic VT s/p multiple shocks, amio bolus/ infusion, and ultimately IABP  placement on 4/10. [place] [facility] [place] of care due to refractory PMVT. On 4/12/19, pt went to Cath lab and had PCI with SHIRA x1 to ostial- prox LAD region. He had episode of PMVT on 4/13/19 requiring 1 round of CPR and cardioversion of 120 Joules.       Recommendations:  -- Left ventricular unloading and management of cardiogenic shock per primary team. Recommend HTS consult regarding evaluation for advanced options (I.e. OHTx)   -- Continue lidocaine 2 mg/min and amiodarone 1 mg/min for now  -- Monitor and replete electrolytes, K>4, Mg>2.  -- Will need to decide on implantation of ICD before discharge versus LifeVest on discharge

## 2019-04-15 NOTE — ASSESSMENT & PLAN NOTE
Evidence of pulmonary edema on chest xray. Increasing frequency of diuretics  Also covering for CAP given hypoxia, bilateral infiltrates, elevated procalcitonin

## 2019-04-15 NOTE — SUBJECTIVE & OBJECTIVE
Interval History: Pt went into PMVT on 4/13/19 at 5 PM. He was found to be unresponsive so CPR was initiated and he was cardioverted with 120 J. ROSC was quickly obtained. Pt was neurologically intact. His lidocaine drip was increased to 2 mg/min and amiodarone 1 mg/min. IABP setting returned to 1:1.     Review of Systems   Cardiovascular: Negative for chest pain, irregular heartbeat, leg swelling, near-syncope, orthopnea, palpitations and syncope.   Respiratory: Negative for cough and shortness of breath.    Gastrointestinal: Negative for abdominal pain, nausea and vomiting.   Neurological: Negative for dizziness, focal weakness, headaches, light-headedness and weakness.     Objective:     Vital Signs (Most Recent):  Temp: 98.4 °F (36.9 °C) (04/14/19 1915)  Pulse: 64 (04/15/19 0100)  Resp: (!) 30 (04/15/19 0100)  BP: (!) 126/91 (04/14/19 1915)  SpO2: 98 % (04/15/19 0100) Vital Signs (24h Range):  Temp:  [98.4 °F (36.9 °C)] 98.4 °F (36.9 °C)  Pulse:  [59-69] 64  Resp:  [20-31] 30  SpO2:  [95 %-100 %] 98 %  BP: (116-126)/(74-91) 126/91     Weight: 105.6 kg (232 lb 12.9 oz)  Body mass index is 31.57 kg/m².     SpO2: 98 %  O2 Device (Oxygen Therapy): nasal cannula    Physical Exam   Constitutional: He is oriented to person, place, and time. He appears well-developed and well-nourished. No distress.   HENT:   Head: Normocephalic and atraumatic.   Eyes: EOM are normal.   Neck:   TLC in RIJ    Cardiovascular: Normal rate, regular rhythm and normal heart sounds.   Pulmonary/Chest: Effort normal. Rales: on anterior auscultation.   Abdominal: Soft. Bowel sounds are normal.   Genitourinary:   Genitourinary Comments: Coleman in place   Musculoskeletal:   L fem IABP in place, c/d/i   Neurological: He is alert and oriented to person, place, and time.   Skin: Skin is warm and dry. No erythema.   Psychiatric: He has a normal mood and affect. Judgment and thought content normal.       Significant Labs: All pertinent lab results from  the last 24 hours have been reviewed.    Significant Imaging: Echocardiogram:   Transthoracic echo (TTE) complete (Cupid Only):   Results for orders placed or performed during the hospital encounter of 04/08/19   Transthoracic echo (TTE) 2D with Color Flow   Result Value Ref Range    BSA 2.34 m2    TDI SEPTAL 0.04     LV LATERAL E/E' RATIO 13.38     LV SEPTAL E/E' RATIO 26.75     LA WIDTH 4.34 cm    AORTIC VALVE CUSP SEPERATION 2.58 cm    TDI LATERAL 0.08     PV PEAK VELOCITY 1.09 cm/s    LVIDD 4.70 3.5 - 6.0 cm    IVS 1.51 (A) 0.6 - 1.1 cm    PW 1.45 (A) 0.6 - 1.1 cm    Ao root annulus 3.58 cm    LVIDS 3.98 2.1 - 4.0 cm    FS 15 28 - 44 %    LA volume 59.68 cm3    Sinus 3.23 cm    STJ 2.78 cm    Ascending aorta 3.04 cm    LV mass 288.17 g    LA size 2.94 cm    RVDD 4.27 cm    TAPSE 2.00 cm    RV S' 16.79 m/s    Left Ventricle Relative Wall Thickness 0.62 cm    AV mean gradient 4.42 mmHg    AV valve area 2.99 cm2    AV Velocity Ratio 0.98     AV index (prosthetic) 0.92     E/A ratio 2.02     Mean e' 0.06     E wave decelartion time 151.03 msec    IVRT 0.10 msec    Pulm vein S/D ratio 0.69     LVOT diameter 2.03 cm    LVOT area 3.23 cm2    LVOT peak shree [id number] m/s    LVOT peak VTI 22.12 cm    Ao peak shree 1.23 m/s    Ao VTI 23.93 cm    LVOT stroke volume 71.56 cm3    AV peak gradient 6.05 mmHg    E/E' ratio 17.83     MV Peak E Shree 1.07 m/s    TR Max Shree 3.06 m/s    MV Peak A Shree 0.53 m/s    PV Peak S Shree 0.44 m/s    PV Peak D Shree 0.64 m/s    LV Systolic Volume 69.32 mL    LV Systolic Volume Index 30.4 mL/m2    LV Diastolic Volume 102.41 mL    LV Diastolic Volume Index 44.86 mL/m2    LA Volume Index 26.1 mL/m2    LV Mass Index 126.2 g/m2    RA Major Axis 4.58 cm    Left Atrium Minor Axis 5.39 cm    Left Atrium Major Axis 5.62 cm    Triscuspid Valve Regurgitation Peak Gradient 37.45 mmHg    RA Width 4.30 cm    Right Atrial Pressure (from IVC) 3 mmHg    TV rest pulmonary artery pressure 40 mmHg

## 2019-04-15 NOTE — PROGRESS NOTES
04/14/2019  Grabiel Jauqez    Current provider:  Yovany Macdonald MD      I, Grabiel Jaquez, rounded on Kristopher Kevin to ensure all mechanical assist device settings (IABP or VAD) were appropriate and all parameters were within limits.  I was able to ensure all back up equipment was present, the staff had no issues, and the Perfusion Department daily rounding was complete.    8:31 PM

## 2019-04-15 NOTE — PHYSICIAN QUERY
PT Name: Kristopher Kevin  MR #: [id number]     Physician Query Form - Cardiomyopathy Clarification     CDS/: Brissa Pearl               Contact information: [email or web address]  This form is a permanent document in the medical record.     Query Date: April 15, 2019    By submitting this query, we are merely seeking further clarification of documentation to reflect the severity of illness of your patient. Please utilize your independent clinical judgment when addressing the question(s) below.    The Medical record reflects the following:     Indicators   Supporting Clinical Findings Location in Medical Record   x Cardiomyopathy, NICM, CM or similar term documented . He presented to the ER at an outside facility with troponin 6.6 where he was admitted with NSTEMI, HTN emergency, respiratory failure and newly diagnosed cardiomyopathy.      Cardiology H&P 4/11    x Acute/Chronic Illness   * NSTEMI (non-ST elevated myocardial infarction)    VT (ventricular tachycardia)    Acute congestive heart failure      Acute respiratory failure with hypoxia and hypercapnia         Cardiology H&P   x Echo, Radiology, and/or Heart Cath Findings · erately decreased left ventricular systolic function. The estimated ejection fraction is 30%  · Concentric left ventricular hypertrophy.  · Grade III (severe) left ventricular diastolic dysfunction consistent with restrictive physiology.  · Elevated left atrial pressure.  · Mildly to moderately reduced right ventricular systolic function.  · The estimated PA systolic pressure is 40 mm Hg TTE 4/8     BiPAP/Intubation/Supplemental O2      SOB, WOODS, Fatigue, Dizziness, LE Edema, Cyanosis, Chest Pain, Pulmonary HTN, Respiratory Distress, Hypoxia, etc.      Treatment                                 Medication      Other       Provider, please specify the type of cardiomyopathy:    [   ] Hypertensive   [  x ] Ischemic   [   ] Non-ischemic Dilated (congestive)    [   ] Non-ischemic  Hypertrophic obstructive    [   ] Non-ischemic Hypertrophic non-obstructive   [   ] Non-ischemic Restrictive    [   ] Cardiomyopathy, type unspecified or unknown   [   ] Cardiomyopathy Ruled Out   [   ] Other type of cardiomyopathy (please specify):    [   ]  Clinically Undetermined       Please document in your progress notes daily for the duration of treatment until resolved, and include in your discharge summary.

## 2019-04-16 NOTE — ASSESSMENT & PLAN NOTE
Continue with medical management. HTS following and in contact with about patient's status. ECMO/Tandem not appropriate at this time. Dr. Muñiz to review and give final recommendations.

## 2019-04-16 NOTE — DISCHARGE SUMMARY
"  [facility]  Cardiology  Discharge Summary      Patient Name: Kristopher Kevin  MRN: [id number]  Admission Date: 4/8/2019  Hospital Length of Stay: 8 days  Discharge Date and Time: 4/16/2019  4:23 PM  Attending Physician: No att. providers found    Discharging Provider: Antoine Scott MD  Primary Care Physician: No primary care provider on file.    HPI:   53 year old male with PMH DM2, tobacco use, initially presented with complaints of shortness of breath x 1 week following an episode of chest pain. He presented to the ER at an outside facility with troponin 6.6 where he was admitted with NSTEMI, HTN emergency, respiratory failure and newly diagnosed cardiomyopathy. He also had a brief episode of AF with RVR that converted to NSR following an amiodarone bolus. He was taken to the cath lab and found to have 80% prox LAD lesion s/p PCI with SHIRA. During angiography the patient had catheter induced VT upon entering the LV requiring cardioversion and RHC revealed PCWP 31 and SPAP 56.     On the evening of 4/9/19 the patient was found to be unresponsive, received CPR and defibrillation for pulseless VT. Accelerated idioventricular rhythm was suspected and the patient was started on IV amiodarone. On 4/10/19 the patient experienced VT again requiring two shocks and 3 rounds of chest compressions before ROSC. Rhythm strips present in the chart appear to be polymorphic VT. The patient experienced another VT event requiring defibrillation and was taken to the cath lab for IABP placement.     Upon admission at [facility] [place], the patient is accompanied by his wife. The patient reports that approximately one week prior to admission he developed epigastric discomfort which he felt was "gas pain". This pain occurred 2-3 times, was dull, non-radiating, had no aggravating or alleviating factors, and would resolve within an hour after taking pepcid and laying down. He had no recurrence of this discomfort, however during " the following week the patient developed NYHA III WOODS, orthopnea, PND and peripheral edema. He denied palpitations or syncope. While hospitalized, he does not remember any events leading to any of his defibrillations; he only remembers being shocked and waking up surrounded by hospital staff. At this time the patient is comfortable and reports no symptoms or concerns.    Procedure(s) (LRB):  ANGIOGRAM, CORONARY ARTERY (N/A)  INSERTION, STENT, CORONARY ARTERY (N/A)  Left heart cath (Left)     Indwelling Lines/Drains at time of discharge:  Lines/Drains/Airways     Central Venous Catheter Line                 Percutaneous Central Line Insertion/Assessment - triple lumen  04/12/19 1259 right internal jugular 4 days          Drain                 Urethral Catheter 04/08/19 0630 Non-latex;Straight-tip 16 Fr. 8 days          Line                 IABP 04/10/19 8.0 Fr. 6 days                Hospital Course:  Patient underwent repeat angiogram with PCI of LAD which he tolerated well. Remained on balloon pump. During attempts to wean patient developed further episodes of polymorphic Vtach requiring electrical cardioversion. IABP was put back on 1:1 and remained there. He was started on amiodarone with persistent episodes of Vtach.     HTS and EP consulted.     He was then started on lidocaine infusion in addition to amiodarone at 1. Persistent Vtach.     EP recommended increasing lidocaine to 2 with continued amiodarone.     Overnight 4/15-4/16 patient had several more episodes of Vtach. He was intubated for airway protection due to frequent sedation and cardioversion. Lidocaine increased to 3. Developed bradycardia to the 20s requiring epi.     Discussed with consult services. Patient was not a candidate for advanced mechanical options, continued medical management.     Over the course of the day on 4/16 patient had 8 episodes of Vtach requiring cardioversion from 06:50 to 13:00. Wife present at bedside asked for further  shocks to be held and no compressions be performed if he lost a pulse.     He passed away at 01:06 after another episode of polymorphic Vtach.     Consults:   Consults (From admission, onward)        Status Ordering Provider     Inpatient consult to Cardiology  Once     Provider:  Adrian Jacobo MD    Completed JESÚS DUENAS     Inpatient consult to Cardiothoracic Surgery  Once     Provider:  (Not yet assigned)    Completed ANTHONY JANG     Inpatient consult to Electrophysiology  Once     Provider:  (Not yet assigned)    Completed CASSIDY JOHNSON     Inpatient consult to Interventional Cardiology  Once     Provider:  (Not yet assigned)    Completed CASSIDY JOHNSON     Inpatient consult to Pulmonology  Once     Provider:  Charmaine Dick MD    Completed VIRIDIANA GONZALEZ          Significant Diagnostic Studies: Labs:   BMP:   Recent Labs   Lab 04/16/19  0256 04/16/19  0612 04/16/19  0906 04/16/19  1057   * 281* 292*  --    * 130* 130*  --    K 4.7 4.9 4.8 4.4   CL 97 94* 94*  --    CO2 27 23 25  --    BUN 24* 27* 28*  --    CREATININE 1.0 1.3 1.3  --    CALCIUM 8.8 8.6* 8.4*  --    MG 2.2 2.0  --  2.1   , CMP   Recent Labs   Lab 04/16/19  0256 04/16/19  0612 04/16/19  0906 04/16/19  1057   * 130* 130*  --    K 4.7 4.9 4.8 4.4   CL 97 94* 94*  --    CO2 27 23 25  --    * 281* 292*  --    BUN 24* 27* 28*  --    CREATININE 1.0 1.3 1.3  --    CALCIUM 8.8 8.6* 8.4*  --    PROT 6.1 6.5 6.5  --    ALBUMIN 2.2* 2.4* 2.3*  --    BILITOT 0.6 0.6 0.5  --    ALKPHOS 101 111 109  --    AST 31 42* 30  --    ALT 46* 55* 49*  --    ANIONGAP 8 13 11  --    ESTGFRAFRICA >60.0 >60.0 >60.0  --    EGFRNONAA >60.0 >60.0 >60.0  --    , CBC   Recent Labs   Lab 04/15/19  0318 04/16/19  0256 04/16/19  0612   WBC 11.13 11.90 14.50*   HGB 10.5* 10.6* 11.3*   HCT 32.8* 33.8* 35.9*   * 452* 536*   , INR   Lab Results   Component Value Date    INR 1.1 04/13/2019    INR 1.1 04/13/2019    INR  1.1 04/11/2019    INR 1.1 04/11/2019   , Lipid Panel   Lab Results   Component Value Date    CHOL 186 04/08/2019    HDL 22 (L) 04/08/2019    LDLCALC 140.8 04/08/2019    TRIG 116 04/08/2019    CHOLHDL 11.8 (L) 04/08/2019   , Troponin   Recent Labs   Lab 04/16/19  0256   TROPONINI 1.708*    and A1C:   Recent Labs   Lab 04/04/19  1600 04/08/19  0841   HGBA1C 9.5* 9.1*     Radiology: X-Ray: CXR: X-Ray Chest 1 View (CXR):   Results for orders placed or performed during the hospital encounter of 04/08/19   X-Ray Chest 1 View    Narrative    EXAMINATION:  XR CHEST 1 VIEW    CLINICAL HISTORY:  ETT placement;    TECHNIQUE:  Single frontal view of the chest was performed.    COMPARISON:  April 16, 2019.    FINDINGS:  ET tube tip 3 cm above the susan.  Enteric tube extends below the diaphragm and off the inferior edge of the image.  IABP marker appears unchanged.  Right IJ catheter tip overlies the SVC.    Heart and lungs  appear unchanged when allowing for differences in technique and positioning.      Impression    ET tube tip 3 cm above the susan. Enteric tube extends below the diaphragm and off the inferior edge of the image. IABP marker appears unchanged. Right IJ catheter tip overlies the SVC.    No other significant change from prior study.      Electronically signed by: Alfred Lal MD  Date:    04/16/2019  Time:    04:29    and X-Ray Chest PA and Lateral (CXR): No results found for this visit on 04/08/19.  Cardiac Graphics: ECG: Ventricular Tachycardia, polymorphic and Echocardiogram:   2D echo with color flow doppler: No results found for this or any previous visit. and Transthoracic echo (TTE) complete (Cupid Only):   Results for orders placed or performed during the hospital encounter of 04/08/19   Transthoracic echo (TTE) complete (Cupid Only)   Result Value Ref Range    Ascending aorta 3.61 cm    STJ 3.35 cm    AV mean gradient 6.13 mmHg    Ao peak artur 1.58 m/s    Ao VTI 22.13 cm    IVS 0.90 0.6 - 1.1 cm     LA size 4.43 cm    Left Atrium Major Axis 6.73 cm    Left Atrium Minor Axis 5.56 cm    LVIDD 6.20 3.5 - 6.0 cm    LVIDS 5.20 (A) 2.1 - 4.0 cm    LVOT diameter 2.00 cm    LVOT peak VTI 14.75 cm    PW 0.80 0.6 - 1.1 cm    RA Major Axis 5.03 cm    RA Width 3.61 cm    RVDD 4.50 cm    Sinus 3.51 cm    TAPSE 1.38 cm    TDI LATERAL 0.13     TDI SEPTAL 0.05     LA WIDTH 4.46 cm    LV Diastolic Volume 145.60 mL    LV Systolic Volume 87.38 mL    LVOT peak artur [id number] m/s    FS 16 %    LA volume 102.26 cm3    LV mass 212.52 g    Left Ventricle Relative Wall Thickness 0.26 cm    AV valve area 2.09 cm2    AV Velocity Ratio 0.73     AV index (prosthetic) 0.67     Mean e' 0.09     LVOT area 3.14 cm2    LVOT stroke volume 46.32 cm3    AV peak gradient 9.99 mmHg    LV Systolic Volume Index 39.3 mL/m2    LV Diastolic Volume Index 65.49 mL/m2    LA Volume Index 46.0 mL/m2    LV Mass Index 95.6 g/m2    BSA 2.28 m2     · Angiography: Successful PCI.  · Ost LAD to Prox LAD lesion , 75% stenosed reduced to 0%. Stenosis was proximal to previously placed stent  · A STENT RESOLUTE MARC [id number] stent was successfully placed at 14 ISABELA for 5 sec.  · LVEDP (Pre): 35  Estimated blood loss: <50 mL    Pending Diagnostic Studies:     Procedure Component Value Units Date/Time    Brain natriuretic peptide [[id number]] Collected:  04/11/19 2218    Order Status:  Sent Lab Status:  In process Updated:  04/11/19 2218    Specimen:  Blood     Magnesium [[id number]] Collected:  04/14/19 0144    Order Status:  Sent Lab Status:  In process Updated:  04/14/19 0145    Specimen:  Blood     Transthoracic echo (TTE) complete (Cupid Only) [[id number]]     Order Status:  Sent Lab Status:  No result           Final Active Diagnoses:    Diagnosis Date Noted POA    PRINCIPAL PROBLEM:  Cardiogenic shock [R57.0] 04/10/2019 Yes    Alteration in skin integrity [R23.9] 04/15/2019 Yes    Acute systolic heart failure [I50.21] 04/15/2019 Yes    Abnormal EKG [R94.31]   Yes    Bilateral lower extremity edema [R60.0]  Yes    Shortness of breath [R06.02]  Yes    Acute respiratory failure with hypoxia [J96.01] 04/12/2019 Yes    VT (ventricular tachycardia) [I47.2] 04/10/2019 No    V-tach [I47.2] 04/10/2019 No    NSTEMI (non-ST elevated myocardial infarction) [I21.4] 04/08/2019 Yes    Acute pulmonary edema [J81.0] 04/08/2019 Yes    Acute congestive heart failure [I50.9] 04/08/2019 Yes    Diabetes mellitus type II, uncontrolled [E11.65] 02/13/2017 Yes    Hyperlipidemia [E78.5] 02/13/2017 Yes      Problems Resolved During this Admission:    Diagnosis Date Noted Date Resolved POA    Acute respiratory failure with hypoxia and hypercapnia [J96.01, J96.02] 04/08/2019 04/15/2019 Yes    Hypertensive emergency [I16.1] 04/08/2019 04/11/2019 Yes    Abnormal LFTs [R94.5] 02/13/2017 04/11/2019 Yes    Tobacco use disorder [F17.200] 08/14/2012 04/16/2019 Yes    Obesity [E66.9] 08/14/2012 04/16/2019 Yes     * Cardiogenic shock  Requiring balloon pump support.   C/b HFrEF, NSTEMI, Vtach    Acute respiratory failure with hypoxia  Evidence of pulmonary edema on chest xray. Increasing frequency of diuretics  Also covering for CAP given hypoxia, bilateral infiltrates, elevated procalcitonin    VT (ventricular tachycardia)  Tracings in chart appear to be polymorphic VT, repeat episode here >48 hours post MI.  Amiodarone at 1, Lidocaine increased to 3 overnight.   IABP remains at 1:1 for support.     Suspect ischemic scar as likely culprit lesion.   Cardiac monitoring  Pacer pads on patient  Monitor/replete electrolytes  K > 4, Mg > 2    Acute congestive heart failure  CVP Elevated, evidence of likely pulmonary edema on CXR. Still requiring supplemental oxygen.  Balloon pump in place.   Lasix TID.    NSTEMI (non-ST elevated myocardial infarction)  late presenting MI  Status post PCI of the LAD with residual disease, second angiogram performed after transfer here.   DAPT with ASA,  clopidogrel  Started on low dose Coreg, Lisinopril for GDMT    Balloon pump support in place.     HTS/CT Surgery consulted to evaluate for advanced options given episodes of V tach >48 hours post MI  No advanced options offered.         Hyperlipidemia  atorvastatin 40    Diabetes mellitus type II, uncontrolled  insulin detemir 10 units nightly  Target -180 while hospitalized        Discharged Condition:     Disposition:     Follow Up:    Patient Instructions:   No discharge procedures on file.  Medications:  None (patient  at medical facility)      Patient seen and evaluated on the day of discharge.     Interval History: Several episodes of V tach overnight requiring cardioversion. Intubated.     Became bradycardic this AM requiring epi.     Amio at 1, lidocaine increased to 3. IABP still at 1:1.     Review of Systems   Unable to obtain: Patient intubated.      Objective:      Vitals:    19 1306   BP:    Pulse: (!) 0   Resp: (!) 22   Temp:               Lines/Drains/Airways            Central Venous Catheter Line                          Percutaneous Central Line Insertion/Assessment - triple lumen  19 1259 right internal jugular 3 days                   Drain                          Urethral Catheter 19 0630 Non-latex;Straight-tip 16 Fr. 7 days                   Line                          IABP 04/10/19 8.0 Fr. 5 days                   Peripheral Intravenous Line                          Peripheral IV - Single Lumen 19 0800 Posterior;Right Hand 4 days           Peripheral IV - Single Lumen 19 1200 20 G Left;Posterior Forearm 4 days           Peripheral IV - Single Lumen 19 0100 18 G Right Forearm 3 days                     Physical Exam   Constitutional: Intubated and sedated  HENT:   Eyes: Conjunctivae normal. Normal sclerae  Neck:   TLC in RIJ    Cardiovascular: Regular bradycardia, rate 20s.   Pulmonary/Chest: Effort normal. Rales: on anterior  auscultation.   Abdominal: Soft. Bowel sounds are normal. He exhibits no distension. There is no tenderness.   Genitourinary:   Genitourinary Comments: Coleman in place   Musculoskeletal: He exhibits no edema.   L fem IABP in place, c/d/i   Neurological: Inutabted and sedated. Pupils reactive and equal  Skin: Skin is warm and dry. No erythema.         Time spent on the discharge of patient: 45 minutes    Antoine Scott MD   Internal Medicine PGY-2  [phone]

## 2019-04-16 NOTE — SIGNIFICANT EVENT
CARDIOLOGY SIGNIFICANT EVENT    I was called by RN due to VT arrest. On arrival patient had achieved ROSC. I reviewed telemetry. Polymorphic VT for 1 and a half minutes s/p CPR and 120 J shock with ROSC achieved. Patient on high flow O2. ABG nad VGB WNL. CXR with edema, CVP 8 -> 12    PLAN  Increase diuretic to 20 mg/hr  Increase Amio to 1 Cont Lido at 2  Limited options, await HTS verdict on advanced options    Discussed with staff and EP fellow    WESLY Orourke MD  [id number]  Cardiology Fellow, PGY 4

## 2019-04-16 NOTE — PLAN OF CARE
04/15/19 2319   Discharge Reassessment   Assessment Type Discharge Planning Reassessment   Do you have any problems affording any of your prescribed medications? No   Discharge Plan A Home with family   Discharge Plan B Home Health   DME Needed Upon Discharge  none   Anticipated Discharge Disposition Home-Health

## 2019-04-16 NOTE — PROGRESS NOTES
Sustained VTach on monitor. Pulse lost while in Vtach,Chest compressions began, defibrillator pads applied, vfib on monitor, 1 shock delivered 120 joules, NSR after shock. Pt. AAAO x 4, no deficits noted Dr. Iglesia Matt at bedside. Amio gtt increased to 1mg/min, lasix increased to 20 mg/hr, VBG/ABG (done) CXR. Pt.'s wife notified of events, will continue to monitor pt.

## 2019-04-16 NOTE — SIGNIFICANT EVENT
CARDIOLOGY SIGNIFICANT EVENT    I was called by RN due to VT arrest. Patient seen and examined. Tele reviewed same polymorphic VT.    Will intubate and sedate to decrease adrenegic drive, protect airway and avoid aspiration. sedate with propofol + fentanyl.  Increase Lidocaine to 3.'    Patient had a brief 15 sec run of polymorphic VT which we did CPR and shocked him out of post intubation (not fully sedated).    Discuss with EP/ HTS in AM    Discussed with staff    WESLY Orourke MD  [id number]  Cardiology Fellow, PGY 4

## 2019-04-16 NOTE — NURSING
Dr. Macdonald at bedside discussing POC with family. Wife Estelita stated she did not want him to be shocked anymore or for chest compressions to be performed again. Pt then went into VT and remained in V T . MD at bedside. Wife at bedside. Pt passed. See MD note for further details and TOD.

## 2019-04-16 NOTE — SUBJECTIVE & OBJECTIVE
No current facility-administered medications on file prior to encounter.      Current Outpatient Medications on File Prior to Encounter   Medication Sig    blood sugar diagnostic Strp 1 strip by Misc.(Non-Drug; Combo Route) route 3 (three) times daily.    blood-glucose meter kit Use as instructed    furosemide (LASIX) 20 MG tablet Take 1 tablet (20 mg total) by mouth daily as needed (shortness of breath).    glimepiride (AMARYL) 4 MG tablet Take 1 tablet (4 mg total) by mouth daily with breakfast.    irbesartan (AVAPRO) 75 MG tablet Take 1 tablet (75 mg total) by mouth once daily.    lancets Misc 1 application by Misc.(Non-Drug; Combo Route) route 3 (three) times daily.       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    Microalbuminuria 2/13/2017    Nodular prostate without urinary obstruction 8/14/2012    Tobacco use disorder 8/14/2012     Past Surgical History:   Procedure Laterality Date    ANGIOGRAM, CORONARY ARTERY N/A 4/12/2019    Performed by [facility]der [facility]otson MD at [facility] CATH LAB    CATHETERIZATION, HEART, BOTH LEFT AND RIGHT Bilateral 4/9/2019    Performed by Adrian [facility]o MD at [facility] CATH LAB    HERNIA REPAIR      INSERTION, INTRA-AORTIC BALLOON PUMP N/A 4/10/2019    Performed by Adrian Jacobo MD at [facility] CATH LAB    INSERTION, STENT, CORONARY ARTERY N/A 4/12/2019    Performed by Ander Dotson MD at [facility] CATH LAB    IVUS, Coronary  4/9/2019    Performed by Adrian Jacobo MD at [facility] CATH LAB    Left heart cath Left 4/12/2019    Performed by Ander Dotson MD at [facility] CATH LAB    Percutaneous coronary intervention N/A 4/9/2019    Performed by Adrian KOWALSKI  MD Donnell at [facility] CATH LAB     Family History     Problem Relation (Age of Onset)    Diabetes Mother        Tobacco Use    Smoking status: Current Every Day Smoker     Packs/day: 1.50     Types: Cigarettes    Smokeless tobacco: Never Used    Tobacco comment: Quick 3 days ago   Substance and Sexual Activity    Alcohol use: Yes    Drug use: Never    Sexual activity: Not Currently     Review of Systems   Unable to perform ROS: Intubated     Objective:     Vital Signs (Most Recent):  Temp: 98.4 °F (36.9 °C) (04/16/19 1100)  Pulse: (!) 52 (04/16/19 1200)  Resp: (!) 22 (04/16/19 1200)  BP: (!) 103/57 (04/16/19 1215)  SpO2: 97 % (04/16/19 1200) Vital Signs (24h Range):  Temp:  [97.6 °F (36.4 °C)-98.8 °F (37.1 °C)] 98.4 °F (36.9 °C)  Pulse:  [51-76] 52  Resp:  [14-38] 22  SpO2:  [92 %-100 %] 97 %  BP: ()/() 103/57     Weight: 105.2 kg (232 lb)  Body mass index is 33.29 kg/m².    SpO2: 97 %  O2 Device (Oxygen Therapy): ventilator     Intake/Output - Last 3 Shifts       04/14 0700 - 04/15 0659 04/15 0700 - 04/16 0659 04/16 0700 - 04/17 0659    P.O. 240 730     I.V. (mL/kg) 1615.9 (15.3) 1714.4 (16.2) 733.4 (7)    IV Piggyback  100     Total Intake(mL/kg) 1855.9 (17.6) 2544.4 (24.1) 733.4 (7)    Urine (mL/kg/hr) 1858 (0.7) 3171 (1.3) 535 (0.8)    Total Output 1858 3171 535    Net -2.1 -626.6 +198.4           Urine Occurrence 1 x             Lines/Drains/Airways     Central Venous Catheter Line                 Percutaneous Central Line Insertion/Assessment - triple lumen  04/12/19 1259 right internal jugular 4 days          Drain                 Urethral Catheter 04/08/19 0630 Non-latex;Straight-tip 16 Fr. 8 days          Airway                 Airway - Non-Surgical 04/16/19 0356 Endotracheal Tube less than 1 day          Line                 IABP 04/10/19 8.0 Fr. 6 days          Peripheral Intravenous Line                 Peripheral IV - Single Lumen 04/11/19 0800 Posterior;Right Hand 5 days          Peripheral IV - Single Lumen 04/11/19 1200 20 G Left;Posterior Forearm 5 days         Peripheral IV - Single Lumen 04/12/19 0100 18 G Right Forearm 4 days         Peripheral IV - Single Lumen 04/16/19 0830 18 G Left Antecubital less than 1 day                Physical Exam   Constitutional: He is oriented to person, place, and time. He appears well-developed and well-nourished.   HENT:   Head: Normocephalic.   Cardiovascular: Normal heart sounds. An irregular rhythm present. Bradycardia present.   Pulmonary/Chest: Effort normal and breath sounds normal.   Abdominal: Soft.   Neurological: He is alert and oriented to person, place, and time.   Skin: Skin is warm, dry and intact. Capillary refill takes less than 2 seconds.       Significant Labs:  BMP:   Recent Labs   Lab 04/16/19  0906 04/16/19  1057   *  --    *  --    K 4.8 4.4   CL 94*  --    CO2 25  --    BUN 28*  --    CREATININE 1.3  --    CALCIUM 8.4*  --    MG  --  2.1     CBC:   Recent Labs   Lab 04/16/19  0612   WBC 14.50*   RBC 4.17*   HGB 11.3*   HCT 35.9*   *   MCV 86   MCH 27.1   MCHC 31.5*     Coagulation:   Recent Labs   Lab 04/16/19  0256   APTT 44.2*     Lactic Acid:   Recent Labs   Lab 04/16/19  0612   LACTATE 0.9       Significant Diagnostics:  · ECHO Techically challenging portable study.  · Moderately decreased left ventricular systolic function. The estimated ejection fraction is 35-40%  · Left ventricular diastolic dysfunction.  · Mild right ventricular enlargement with Normal right ventricular systolic function.  · Moderate left atrial enlargement.  · Mechanically ventilated. IABP in place.   LVEDD 6.2cm  TAPSE 1.38cm

## 2019-04-16 NOTE — SUBJECTIVE & OBJECTIVE
Interval History: Patient went into VT arrest overnight, s/p 1.5 min CPR and 120 J shock with subsequent ROSC.  Amio and lidocaine infusions increased. Unfortunately patient continued with episodes of PMVT and recurrent VT arrest therefore decision made to intubate and sedate to decrease adrene    Review of Systems   Unable to perform ROS: intubated     Objective:     Vital Signs (Most Recent):  Temp: 98.7 °F (37.1 °C) (04/16/19 0730)  Pulse: (!) 51 (04/16/19 0800)  Resp: 19 (04/16/19 0800)  BP: (!) 89/65 (04/16/19 0800)  SpO2: (!) 94 % (04/16/19 0800) Vital Signs (24h Range):  Temp:  [97.6 °F (36.4 °C)-98.8 °F (37.1 °C)] 98.7 °F (37.1 °C)  Pulse:  [51-76] 51  Resp:  [14-49] 19  SpO2:  [92 %-100 %] 94 %  BP: ()/(63-77) 89/65     Weight: 105.6 kg (232 lb 12.9 oz)  Body mass index is 31.57 kg/m².     SpO2: (!) 94 %  O2 Device (Oxygen Therapy): ventilator    Physical Exam   Constitutional: He appears well-developed and well-nourished. No distress.   HENT:   Head: Normocephalic and atraumatic.   ETT in place   Eyes: EOM are normal.   Neck:   TLC in RIJ    Cardiovascular: Normal rate, regular rhythm and normal heart sounds.   Pulmonary/Chest: Effort normal. Rales: on anterior auscultation.   Abdominal: Soft. Bowel sounds are normal.   Genitourinary:   Genitourinary Comments: Coleman in place   Musculoskeletal:   L fem IABP in place, c/d/i   Neurological:   Intubated/sedated   Skin: Skin is warm and dry. No erythema.   Psychiatric: He has a normal mood and affect. Judgment and thought content normal.       Significant Labs: All pertinent lab results from the last 24 hours have been reviewed.    Significant Imaging: Echocardiogram:   Transthoracic echo (TTE) complete (Cupid Only):   Results for orders placed or performed during the hospital encounter of 04/08/19   Transthoracic echo (TTE) 2D with Color Flow   Result Value Ref Range    BSA 2.34 m2    TDI SEPTAL 0.04     LV LATERAL E/E' RATIO 13.38     LV SEPTAL E/E'  RATIO 26.75     LA WIDTH 4.34 cm    AORTIC VALVE CUSP SEPERATION 2.58 cm    TDI LATERAL 0.08     PV PEAK VELOCITY 1.09 cm/s    LVIDD 4.70 3.5 - 6.0 cm    IVS 1.51 (A) 0.6 - 1.1 cm    PW 1.45 (A) 0.6 - 1.1 cm    Ao root annulus 3.58 cm    LVIDS 3.98 2.1 - 4.0 cm    FS 15 28 - 44 %    LA volume 59.68 cm3    Sinus 3.23 cm    STJ 2.78 cm    Ascending aorta 3.04 cm    LV mass 288.17 g    LA size 2.94 cm    RVDD 4.27 cm    TAPSE 2.00 cm    RV S' 16.79 m/s    Left Ventricle Relative Wall Thickness 0.62 cm    AV mean gradient 4.42 mmHg    AV valve area 2.99 cm2    AV Velocity Ratio 0.98     AV index (prosthetic) 0.92     E/A ratio 2.02     Mean e' 0.06     E wave decelartion time 151.03 msec    IVRT 0.10 msec    Pulm vein S/D ratio 0.69     LVOT diameter 2.03 cm    LVOT area 3.23 cm2    LVOT peak shree [id number] m/s    LVOT peak VTI 22.12 cm    Ao peak shree 1.23 m/s    Ao VTI 23.93 cm    LVOT stroke volume 71.56 cm3    AV peak gradient 6.05 mmHg    E/E' ratio 17.83     MV Peak E Shree 1.07 m/s    TR Max Shree 3.06 m/s    MV Peak A Shree 0.53 m/s    PV Peak S Shree 0.44 m/s    PV Peak D Shree 0.64 m/s    LV Systolic Volume 69.32 mL    LV Systolic Volume Index 30.4 mL/m2    LV Diastolic Volume 102.41 mL    LV Diastolic Volume Index 44.86 mL/m2    LA Volume Index 26.1 mL/m2    LV Mass Index 126.2 g/m2    RA Major Axis 4.58 cm    Left Atrium Minor Axis 5.39 cm    Left Atrium Major Axis 5.62 cm    Triscuspid Valve Regurgitation Peak Gradient 37.45 mmHg    RA Width 4.30 cm    Right Atrial Pressure (from IVC) 3 mmHg    TV rest pulmonary artery pressure 40 mmHg

## 2019-04-16 NOTE — CONSULTS
"[facility]  Cardiothoracic Surgery  Consult Note    Patient Name: Kristopher Kevin  MRN: [id number]  Admission Date: 4/8/2019  Attending Physician: Yovany Macdonald MD  Referring Provider: Self, Aaareferral    Patient information was obtained from past medical records    Inpatient consult to Cardiothoracic Surgery  Consult performed by: Umm Preston NP  Consult ordered by: Uvaldo Matt MD  Reason for consult: ECMO/Tandem         Subjective:     Principal Problem: Cardiogenic shock    History of Present Illness: 53 year old male with PMH DM2, tobacco use, initially presented with complaints of shortness of breath x 1 week following an episode of chest pain. He presented to the ER at an outside facility with troponin 6.6 where he was admitted with NSTEMI, HTN emergency, respiratory failure and newly diagnosed cardiomyopathy. He also had a brief episode of AF with RVR that converted to NSR following an amiodarone bolus. He was taken to the cath lab and found to have 80% prox LAD lesion s/p PCI with SHIRA. During angiography the patient had catheter induced VT upon entering the LV requiring cardioversion and RHC revealed PCWP 31 and SPAP 56.      On the evening of 4/9/19 the patient was found to be unresponsive, received CPR and defibrillation for pulseless VT. Accelerated idioventricular rhythm was suspected and the patient was started on IV amiodarone. On 4/10/19 the patient experienced VT again requiring two shocks and 3 rounds of chest compressions before ROSC. Rhythm strips present in the chart appear to be polymorphic VT. The patient experienced another VT event requiring defibrillation and was taken to the cath lab for IABP placement.      [place] [facility] the patient is accompanied by his wife. The patient reports that approximately one week prior to admission he developed epigastric discomfort which he felt was "gas pain". This pain occurred 2-3 times, was dull, " non-radiating, had no aggravating or alleviating factors, and would resolve within an hour after taking pepcid and laying down. He had no recurrence of this discomfort, however during the following week the patient developed NYHA III WOODS, orthopnea, PND and peripheral edema. He denied palpitations or syncope. While hospitalized, he does not remember any events leading to any of his defibrillations; he only remembers being shocked and waking up surrounded by hospital staff. At this time the patient is comfortable and reports no symptoms or concerns.        No current facility-administered medications on file prior to encounter.      Current Outpatient Medications on File Prior to Encounter   Medication Sig    blood sugar diagnostic Strp 1 strip by Misc.(Non-Drug; Combo Route) route 3 (three) times daily.    blood-glucose meter kit Use as instructed    furosemide (LASIX) 20 MG tablet Take 1 tablet (20 mg total) by mouth daily as needed (shortness of breath).    glimepiride (AMARYL) 4 MG tablet Take 1 tablet (4 mg total) by mouth daily with breakfast.    irbesartan (AVAPRO) 75 MG tablet Take 1 tablet (75 mg total) by mouth once daily.    lancets Misc 1 application by Misc.(Non-Drug; Combo Route) route 3 (three) times daily.       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    Microalbuminuria 2/13/2017    Nodular prostate without urinary obstruction 8/14/2012    Tobacco use disorder 8/14/2012     Past Surgical History:   Procedure Laterality Date    ANGIOGRAM, CORONARY ARTERY N/A 4/12/2019    [facility]formed by [facility] CATH LAB    CATHETERIZATION, HEART, BOTH LEFT AND RIGHT  Bilateral 4/9/2019    [facility] CATH LAB    HERNIA REPAIR      INSERTION, INTRA-AORTIC BALLOON PUMP N/A 4/10/2019    Performed by Adrian [facility]o MD at [facility] CATH LAB    INSERTION, STENT, CORONARY ARTERY N/A 4/12/2019    Performed by [facility]der [facility]on MD at [facility] CATH LAB    IVUS, Coronary  4/9/2019    Performed by Adrian [facility]o MD at [facility] CATH LAB    Left heart cath Left 4/12/2019    Performed by [facility]on [facility] at [facility] CATH LAB    Percutaneous coronary intervention N/A 4/9/2019    Performed by Adrian Jacobo MD at [facility] CATH LAB     Family History     Problem Relation (Age of Onset)    Diabetes Mother        Tobacco Use    Smoking status: Current Every Day Smoker     Packs/day: 1.50     Types: Cigarettes    Smokeless tobacco: Never Used    Tobacco comment: Quick 3 days ago   Substance and Sexual Activity    Alcohol use: Yes    Drug use: Never    Sexual activity: Not Currently     Review of Systems   Unable to perform ROS: Intubated     Objective:     Vital Signs (Most Recent):  Temp: 98.4 °F (36.9 °C) (04/16/19 1100)  Pulse: (!) 52 (04/16/19 1200)  Resp: (!) 22 (04/16/19 1200)  BP: (!) 103/57 (04/16/19 1215)  SpO2: 97 % (04/16/19 1200) Vital Signs (24h Range):  Temp:  [97.6 °F (36.4 °C)-98.8 °F (37.1 °C)] 98.4 °F (36.9 °C)  Pulse:  [51-76] 52  Resp:  [14-38] 22  SpO2:  [92 %-100 %] 97 %  BP: ()/() 103/57     Weight: 105.2 kg (232 lb)  Body mass index is 33.29 kg/m².    SpO2: 97 %  O2 Device (Oxygen Therapy): ventilator     Intake/Output - Last 3 Shifts       04/14 0700 - 04/15 0659 04/15 0700 - 04/16 0659 04/16 0700 - 04/17 0659    P.O. 240 730     I.V. (mL/kg) 1615.9 (15.3) 1714.4 (16.2) 733.4 (7)    IV Piggyback  100     Total Intake(mL/kg) 1855.9 (17.6) 2544.4 (24.1) 733.4 (7)    Urine (mL/kg/hr) 1858 (0.7) 3171 (1.3) 535 (0.8)    Total Output 1858 3171 535    Net -2.1 -626.6 +198.4           Urine Occurrence 1 x              Lines/Drains/Airways     Central Venous Catheter Line                 Percutaneous Central Line Insertion/Assessment - triple lumen  04/12/19 1259 right internal jugular 4 days          Drain                 Urethral Catheter 04/08/19 0630 Non-latex;Straight-tip 16 Fr. 8 days          Airway                 Airway - Non-Surgical 04/16/19 0356 Endotracheal Tube less than 1 day          Line                 IABP 04/10/19 8.0 Fr. 6 days          Peripheral Intravenous Line                 Peripheral IV - Single Lumen 04/11/19 0800 Posterior;Right Hand 5 days         Peripheral IV - Single Lumen 04/11/19 1200 20 G Left;Posterior Forearm 5 days         Peripheral IV - Single Lumen 04/12/19 0100 18 G Right Forearm 4 days         Peripheral IV - Single Lumen 04/16/19 0830 18 G Left Antecubital less than 1 day                Physical Exam   Constitutional: He is intubated and sedated. He appears well-developed and well-nourished.   HENT:   Head: Normocephalic.   Cardiovascular: Normal heart sounds. An irregular rhythm present. Bradycardia present. IABP in place  Pulmonary/Chest: Effort normal and breath sounds normal.   Abdominal: Soft.   Neurological: He is intubated and sedated   Skin: Skin is warm, dry and intact. Capillary refill takes less than 2 seconds.       Significant Labs:  BMP:   Recent Labs   Lab 04/16/19  0906 04/16/19  1057   *  --    *  --    K 4.8 4.4   CL 94*  --    CO2 25  --    BUN 28*  --    CREATININE 1.3  --    CALCIUM 8.4*  --    MG  --  2.1     CBC:   Recent Labs   Lab 04/16/19  0612   WBC 14.50*   RBC 4.17*   HGB 11.3*   HCT 35.9*   *   MCV 86   MCH 27.1   MCHC 31.5*     Coagulation:   Recent Labs   Lab 04/16/19  0256   APTT 44.2*     Lactic Acid:   Recent Labs   Lab 04/16/19  0612   LACTATE 0.9       Significant Diagnostics:  · ECHO Techically challenging portable study.  · Moderately decreased left ventricular systolic function. The estimated ejection fraction is  35-40%  · Left ventricular diastolic dysfunction.  · Mild right ventricular enlargement with Normal right ventricular systolic function.  · Moderate left atrial enlargement.  · Mechanically ventilated. IABP in place.   LVEDD 6.2cm  TAPSE 1.38cm      Assessment/Plan:     NSTEMI (non-ST elevated myocardial infarction)  Continue with medical management. HTS following and in contact with about patient's status. ECMO/Tandem not appropriate at this time. Dr. Muñiz to review and give final recommendations.         Thank you for your consult. I will follow-up with patient. Please contact us if you have any additional questions.    Umm Preston NP  Cardiothoracic Surgery  [facility]

## 2019-04-16 NOTE — PROGRESS NOTES
Pt. Intubated with a 8.0 ETT measured and taped 26 cm at the lips and placed on conventional ventilator with following settings A/C 12/500/+5/100% ambu bag and mask at the bedside. Will continue to monitor.

## 2019-04-16 NOTE — SIGNIFICANT EVENT
1226:MMVT shocked @ 150Joules converted to SR with pulse  1236: PMVT, shocked at 120 Joules, converted to SR with pulse.  1244: PMVT shocked at 120 Joules, pulse regained NSR-SB on monitor.  1246:PMVT shocked @ 150 Joules,  Pulse regained, irregular rhythm, appears Aflutter/afib    MD notified each time. MD at bedside. Family at bedside. Pt has been shocked 8 times since 0650 this am.

## 2019-04-16 NOTE — ANESTHESIA PROCEDURE NOTES
Intubation    Diagnosis: respiratory failure  Patient location during procedure: ICU  Procedure start time: 4/16/2019 3:56 AM  Timeout: 4/16/2019 3:56 AM  Procedure end time: 4/16/2019 4:06 AM  Staffing  Resident/CRNA: Raul Galvez MD  Performed: resident/CRNA   Preanesthetic Checklist  Completed: patient identified, surgical consent, pre-op evaluation, timeout performed, IV checked, risks and benefits discussed, monitors and equipment checked and anesthesia consent given  Intubation  Indication: respiratory failure  Pre-oxygenation. Induction: intravenous, mask ventilation: easy mask.  Intubation: postinduction, laryngoscopy glidescope, Malcolm 3.  Endotracheal Tube: oral, 8.0 mm ID, cuffed (inflated to minimal occlusive pressure)  Attempts: 1, Grade I - full view of cords  Complicating Factors: none  Tube secured at 25 cm at the teeth.  Findings post-intubation: bilateral breath sounds, positive ETCO2, atraumatic / condition of teeth unchanged  Position Confirmation: auscultation

## 2019-04-16 NOTE — PROGRESS NOTES
04/16/2019  Grabiel Jaquez    Current provider:  Yovany Macdonald MD      I, Grabiel Jaquez, rounded on Kristopher Kevin to ensure all mechanical assist device settings (IABP or VAD) were appropriate and all parameters were within limits.  I was able to ensure all back up equipment was present, the staff had no issues, and the Perfusion Department daily rounding was complete.    11:09 AM

## 2019-04-16 NOTE — ASSESSMENT & PLAN NOTE
Tracings in chart appear to be polymorphic VT, repeat episode here >48 hours post MI.  Amiodarone at 1, Lidocaine increased to 3 overnight.   IABP remains at 1:1 for support.     Suspect ischemic scar as likely culprit lesion.   Cardiac monitoring  Pacer pads on patient  Monitor/replete electrolytes  K > 4, Mg > 2

## 2019-04-16 NOTE — SIGNIFICANT EVENT
Dr. Sanchez notified pt in MMVTach. In route. Defib advised to shock. MD on phone, orders to shock. Defib at 120J- converted to NSR, pulse palpable.     1136: MD at bedside. Pt went into PMVtach. No pulse. Shocked at 120J. NSR, pulse palpable.    Family meeting now being held with Dr. Sanchez. [facility]

## 2019-04-16 NOTE — PROGRESS NOTES
Significant Event    Patient shocked total 5 times tonight. He is now with incessant albeit non sustained runs of polymorphic VT despite AADs and sedation for last hour (due to BP difficult to balance sedation and normotension) discussed case with [facility] staff on call as they were consulted yesterday.   Plan is to discuss case with CT surgery this AM about advanced support like ECMO. Will also evaluate if appropriate for cardiac orantes splant. Will start on 10 Tomas and increase lasix. RV function moderately reduced on TTE.  Discussed with [facility] and CCU staff.    WESLY Orourke MD  Cardiology fellow PGY-4

## 2019-04-16 NOTE — EICU
Mr. Kevin was noted to be in a deteriorating condition with recurrent polymorphic VT/VF. Dr. Macdonald was also at the bedside discussing plan of care with the family and determined per family wishes to continue treatment except for no more DCCV or defibrillation. Toward the end of the family meeting Mr. Kevin went into polymorphic VF then fine VF. Family was at the bedside as well as critical care staff and Dr. Macdonald.

## 2019-04-16 NOTE — EICU
Called into room via elert to record time out for intubation    0356 Versed 2mg, Etomidate 40mg  0359 intubated with 8.0 ETT depth of 25 @ teeth  CXR ordered for confirmation

## 2019-04-16 NOTE — ASSESSMENT & PLAN NOTE
CVP Elevated, evidence of likely pulmonary edema on CXR. Still requiring supplemental oxygen.  Balloon pump in place.   Lasix TID.

## 2019-04-16 NOTE — CARE UPDATE
Patient seen and evaluated at bedside. Asystole on monitor. No pulses present. No chest rise, breath sounds or heart sounds. Pupils fixed and dilated. Time of death 13:06 4/16/19.  notified.     Antoine Scott MD   Internal Medicine PGY-2  [phone]

## 2019-04-16 NOTE — PROGRESS NOTES
Pt, on sustained vtach/vfib multiple times, shocked 6 times. Pt. Intubated for airway protection. Laisx increased to 40mg/hr, lidocaine 3, levophed 0.16, propofol, fentanyl and heparin. Urine output decreased ( MD aware) Pt.'s spouse notified of events. Spouse and family at bedside, emotional support provided. Will continue to monitor pt.

## 2019-04-16 NOTE — PROGRESS NOTES
[facility]  Cardiac Electrophysiology  Progress Note    Admission Date: 4/8/2019  Code Status: Full Code   Attending Physician: Yovany Macdonald MD   Expected Discharge Date: 4/17/2019  Principal Problem:Cardiogenic shock    Subjective:     Interval History: Patient went into VT arrest overnight, s/p 1.5 min CPR and 120 J shock with subsequent ROSC.  Amio and lidocaine infusions increased. Unfortunately patient continued with episodes of PMVT and recurrent VT arrest therefore decision made to intubate and sedate to decrease adrene    Review of Systems   Unable to perform ROS: intubated     Objective:     Vital Signs (Most Recent):  Temp: 98.7 °F (37.1 °C) (04/16/19 0730)  Pulse: (!) 51 (04/16/19 0800)  Resp: 19 (04/16/19 0800)  BP: (!) 89/65 (04/16/19 0800)  SpO2: (!) 94 % (04/16/19 0800) Vital Signs (24h Range):  Temp:  [97.6 °F (36.4 °C)-98.8 °F (37.1 °C)] 98.7 °F (37.1 °C)  Pulse:  [51-76] 51  Resp:  [14-49] 19  SpO2:  [92 %-100 %] 94 %  BP: ()/(63-77) 89/65     Weight: 105.6 kg (232 lb 12.9 oz)  Body mass index is 31.57 kg/m².     SpO2: (!) 94 %  O2 Device (Oxygen Therapy): ventilator    Physical Exam   Constitutional: He appears well-developed and well-nourished. No distress.   HENT:   Head: Normocephalic and atraumatic.   ETT in place   Eyes: EOM are normal.   Neck:   TLC in RIJ    Cardiovascular: Normal rate, regular rhythm and normal heart sounds.   Pulmonary/Chest: Effort normal. Rales: on anterior auscultation.   Abdominal: Soft. Bowel sounds are normal.   Genitourinary:   Genitourinary Comments: Coleman in place   Musculoskeletal:   L fem IABP in place, c/d/i   Neurological:   Intubated/sedated   Skin: Skin is warm and dry. No erythema.   Psychiatric: He has a normal mood and affect. Judgment and thought content normal.       Significant Labs: All pertinent lab results from the last 24 hours have been reviewed.    Significant Imaging: Echocardiogram:   Transthoracic echo (TTE)  complete (Cupid Only):   Results for orders placed or performed during the hospital encounter of 04/08/19   Transthoracic echo (TTE) 2D with Color Flow   Result Value Ref Range    BSA 2.34 m2    TDI SEPTAL 0.04     LV LATERAL E/E' RATIO 13.38     LV SEPTAL E/E' RATIO 26.75     LA WIDTH 4.34 cm    AORTIC VALVE CUSP SEPERATION 2.58 cm    TDI LATERAL 0.08     PV PEAK VELOCITY 1.09 cm/s    LVIDD 4.70 3.5 - 6.0 cm    IVS 1.51 (A) 0.6 - 1.1 cm    PW 1.45 (A) 0.6 - 1.1 cm    Ao root annulus 3.58 cm    LVIDS 3.98 2.1 - 4.0 cm    FS 15 28 - 44 %    LA volume 59.68 cm3    Sinus 3.23 cm    STJ 2.78 cm    Ascending aorta 3.04 cm    LV mass 288.17 g    LA size 2.94 cm    RVDD 4.27 cm    TAPSE 2.00 cm    RV S' 16.79 m/s    Left Ventricle Relative Wall Thickness 0.62 cm    AV mean gradient 4.42 mmHg    AV valve area 2.99 cm2    AV Velocity Ratio 0.98     AV index (prosthetic) 0.92     E/A ratio 2.02     Mean e' 0.06     E wave decelartion time 151.03 msec    IVRT 0.10 msec    Pulm vein S/D ratio 0.69     LVOT diameter 2.03 cm    LVOT area 3.23 cm2    LVOT peak shree [id number] m/s    LVOT peak VTI 22.12 cm    Ao peak shree 1.23 m/s    Ao VTI 23.93 cm    LVOT stroke volume 71.56 cm3    AV peak gradient 6.05 mmHg    E/E' ratio 17.83     MV Peak E Shree 1.07 m/s    TR Max Shree 3.06 m/s    MV Peak A Shree 0.53 m/s    PV Peak S Shree 0.44 m/s    PV Peak D Shree 0.64 m/s    LV Systolic Volume 69.32 mL    LV Systolic Volume Index 30.4 mL/m2    LV Diastolic Volume 102.41 mL    LV Diastolic Volume Index 44.86 mL/m2    LA Volume Index 26.1 mL/m2    LV Mass Index 126.2 g/m2    RA Major Axis 4.58 cm    Left Atrium Minor Axis 5.39 cm    Left Atrium Major Axis 5.62 cm    Triscuspid Valve Regurgitation Peak Gradient 37.45 mmHg    RA Width 4.30 cm    Right Atrial Pressure (from IVC) 3 mmHg    TV rest pulmonary artery pressure 40 mmHg     Assessment and Plan:     VT (ventricular tachycardia)  54 yo M with PMHx DM2 (A1c 9.1), HTN, DLD, active smoking (30 py)  admitted to OSH on 4/8 with late presenting MI (trop > 6.6) s/p LHC demonstrating 80% LAD lesion s/p PCI/SHIRA. Post procedural course c/b recurrent polymorphic VT s/p multiple shocks, amio bolus/ infusion, and ultimately IABP  placement on 4/10. Transferred to [facility] on 4/11 for higher level of care due to refractory PMVT. On 4/12/19, pt went to Cath lab and had PCI with SHIRA x1 to ostial- prox LAD region. Unfortunately despite revascularization and amio/lido gtt patient continues with VT storm, had to be emergently intubated/sedated on morning of 4/16.      Recommendations:  -- Patient intubated/sedated to lower adrenergic tone overnight due to recurrent episodes of PMVT / VT storm. Continue sedation and MCS per primary team. Continue with amiodarone 1 mg/min and lidocaine 3 mg/min. Monitor and replete electrolytes aggressively, K>4, Mg>2.  -- Overall patient prognosis guarded. Awaiting HTS evaluation today for consideration of advanced options (ie. OHTx).     Amy Womack MD  Cardiac Electrophysiology  [facility]

## 2019-04-16 NOTE — NURSING
Cards called d/t bradycardia. Doppler carotid pulse 28, IABP reading 28. Monitor showing bigeminy in 50's. MD at bedside. VO to titrate for map 65 on IABP. 12 lead performed. Epi and dobutamine ordered. One time  Atropine 1mg IVP given.  With response.      0900: ECHO tech at bedside.

## 2019-04-16 NOTE — PLAN OF CARE
19 1633   Final Note   Assessment Type Final Discharge Note   Anticipated Discharge Disposition

## 2019-04-16 NOTE — ASSESSMENT & PLAN NOTE
late presenting MI  Status post PCI of the LAD with residual disease, second angiogram performed after transfer here.   DAPT with ASA, clopidogrel  Started on low dose Coreg, Lisinopril for GDMT    Balloon pump support in place.     HTS/CT Surgery consulted to evaluate for advanced options given episodes of V tach >48 hours post MI  No advanced options offered.

## 2019-04-16 NOTE — NURSING
Bedside monitor alarming V-tach (1046), appeared polymorphic. Rhythm analyzed and shock advised, pt without pulse to left carotid, team notified. Patient shocked with 120J per Dr. Sanchez. Pulse returned following shock, team to bedside. Patient alert and following commands.

## 2019-04-16 NOTE — HPI
"53 year old male with PMH DM2, tobacco use, initially presented with complaints of shortness of breath x 1 week following an episode of chest pain. He presented to the ER at an outside facility with troponin 6.6 where he was admitted with NSTEMI, HTN emergency, respiratory failure and newly diagnosed cardiomyopathy. He also had a brief episode of AF with RVR that converted to NSR following an amiodarone bolus. He was taken to the cath lab and found to have 80% prox LAD lesion s/p PCI with SHIRA. During angiography the patient had catheter induced VT upon entering the LV requiring cardioversion and RHC revealed PCWP 31 and SPAP 56.      On the evening of 4/9/19 the patient was found to be unresponsive, received CPR and defibrillation for pulseless VT. Accelerated idioventricular rhythm was suspected and the patient was started on IV amiodarone. On 4/10/19 the patient experienced VT again requiring two shocks and 3 rounds of chest compressions before ROSC. Rhythm strips present in the chart appear to be polymorphic VT. The patient experienced another VT event requiring defibrillation and was taken to the cath lab for IABP placement.      Upon admission at [facility], the patient is accompanied by his wife. The patient reports that approximately one week prior to admission he developed epigastric discomfort which he felt was "gas pain". This pain occurred 2-3 times, was dull, non-radiating, had no aggravating or alleviating factors, and would resolve within an hour after taking pepcid and laying down. He had no recurrence of this discomfort, however during the following week the patient developed NYHA III WOODS, orthopnea, PND and peripheral edema. He denied palpitations or syncope. While hospitalized, he does not remember any events leading to any of his defibrillations; he only remembers being shocked and waking up surrounded by hospital staff. At this time the patient is comfortable and reports no symptoms or concerns.      "

## 2019-04-17 LAB
BACTERIA BLD CULT: NORMAL
BACTERIA BLD CULT: NORMAL
POCT GLUCOSE: 347 MG/DL (ref 70–110)
POCT GLUCOSE: 463 MG/DL (ref 70–110)

## 2019-07-29 ENCOUNTER — TELEPHONE (OUTPATIENT)
Dept: OPTOMETRY | Facility: CLINIC | Age: 54
End: 2019-07-29
